# Patient Record
Sex: MALE | Race: WHITE | NOT HISPANIC OR LATINO | URBAN - METROPOLITAN AREA
[De-identification: names, ages, dates, MRNs, and addresses within clinical notes are randomized per-mention and may not be internally consistent; named-entity substitution may affect disease eponyms.]

---

## 2018-09-14 ENCOUNTER — HOSPITAL ENCOUNTER (OUTPATIENT)
Dept: OTHER | Facility: HOSPITAL | Age: 53
Setting detail: SPECIMEN
Discharge: HOME | End: 2018-09-14
Attending: SURGERY | Admitting: SURGERY

## 2023-10-20 ENCOUNTER — APPOINTMENT (OUTPATIENT)
Dept: CT IMAGING | Facility: HOSPITAL | Age: 58
End: 2023-10-20
Payer: COMMERCIAL

## 2023-10-20 ENCOUNTER — HOSPITAL ENCOUNTER (INPATIENT)
Facility: HOSPITAL | Age: 58
LOS: 1 days | Discharge: HOME | End: 2023-10-23
Attending: EMERGENCY MEDICINE | Admitting: HOSPITALIST
Payer: COMMERCIAL

## 2023-10-20 DIAGNOSIS — R06.02 SHORTNESS OF BREATH: Primary | ICD-10-CM

## 2023-10-20 DIAGNOSIS — R05.1 ACUTE COUGH: ICD-10-CM

## 2023-10-20 DIAGNOSIS — R91.8 LUNG MASS: ICD-10-CM

## 2023-10-20 LAB
ALBUMIN SERPL-MCNC: 3.6 G/DL (ref 3.5–5.2)
ALBUMIN/GLOB SERPL: 0.9 G/DL
ALP SERPL-CCNC: 158 U/L (ref 39–117)
ALT SERPL W P-5'-P-CCNC: 25 U/L (ref 1–41)
ANION GAP SERPL CALCULATED.3IONS-SCNC: 15 MMOL/L (ref 5–15)
AST SERPL-CCNC: 49 U/L (ref 1–40)
B PARAPERT DNA SPEC QL NAA+PROBE: NOT DETECTED
B PERT DNA SPEC QL NAA+PROBE: NOT DETECTED
BASOPHILS # BLD AUTO: 0.1 10*3/MM3 (ref 0–0.2)
BASOPHILS NFR BLD AUTO: 1.1 % (ref 0–1.5)
BILIRUB SERPL-MCNC: 0.4 MG/DL (ref 0–1.2)
BUN SERPL-MCNC: 8 MG/DL (ref 6–20)
BUN/CREAT SERPL: 7.1 (ref 7–25)
C PNEUM DNA NPH QL NAA+NON-PROBE: NOT DETECTED
CALCIUM SPEC-SCNC: 9.6 MG/DL (ref 8.6–10.5)
CHLORIDE SERPL-SCNC: 93 MMOL/L (ref 98–107)
CO2 SERPL-SCNC: 24 MMOL/L (ref 22–29)
CREAT SERPL-MCNC: 1.13 MG/DL (ref 0.76–1.27)
D-LACTATE SERPL-SCNC: 2 MMOL/L (ref 0.3–2)
DEPRECATED RDW RBC AUTO: 45.9 FL (ref 37–54)
EGFRCR SERPLBLD CKD-EPI 2021: 74.9 ML/MIN/1.73
EOSINOPHIL # BLD AUTO: 0.1 10*3/MM3 (ref 0–0.4)
EOSINOPHIL NFR BLD AUTO: 0.6 % (ref 0.3–6.2)
ERYTHROCYTE [DISTWIDTH] IN BLOOD BY AUTOMATED COUNT: 14.7 % (ref 12.3–15.4)
FERRITIN SERPL-MCNC: 546.3 NG/ML (ref 30–400)
FLUAV SUBTYP SPEC NAA+PROBE: NOT DETECTED
FLUBV RNA ISLT QL NAA+PROBE: NOT DETECTED
GLOBULIN UR ELPH-MCNC: 4.1 GM/DL
GLUCOSE SERPL-MCNC: 108 MG/DL (ref 65–99)
HADV DNA SPEC NAA+PROBE: NOT DETECTED
HCOV 229E RNA SPEC QL NAA+PROBE: NOT DETECTED
HCOV HKU1 RNA SPEC QL NAA+PROBE: NOT DETECTED
HCOV NL63 RNA SPEC QL NAA+PROBE: NOT DETECTED
HCOV OC43 RNA SPEC QL NAA+PROBE: NOT DETECTED
HCT VFR BLD AUTO: 33.6 % (ref 37.5–51)
HGB BLD-MCNC: 11.4 G/DL (ref 13–17.7)
HMPV RNA NPH QL NAA+NON-PROBE: NOT DETECTED
HPIV1 RNA ISLT QL NAA+PROBE: NOT DETECTED
HPIV2 RNA SPEC QL NAA+PROBE: NOT DETECTED
HPIV3 RNA NPH QL NAA+PROBE: NOT DETECTED
HPIV4 P GENE NPH QL NAA+PROBE: NOT DETECTED
IRON 24H UR-MRATE: 37 MCG/DL (ref 59–158)
LYMPHOCYTES # BLD AUTO: 1 10*3/MM3 (ref 0.7–3.1)
LYMPHOCYTES NFR BLD AUTO: 11.9 % (ref 19.6–45.3)
M PNEUMO IGG SER IA-ACNC: NOT DETECTED
MCH RBC QN AUTO: 28.8 PG (ref 26.6–33)
MCHC RBC AUTO-ENTMCNC: 34.1 G/DL (ref 31.5–35.7)
MCV RBC AUTO: 84.5 FL (ref 79–97)
MONOCYTES # BLD AUTO: 0.3 10*3/MM3 (ref 0.1–0.9)
MONOCYTES NFR BLD AUTO: 3.2 % (ref 5–12)
NEUTROPHILS NFR BLD AUTO: 6.7 10*3/MM3 (ref 1.7–7)
NEUTROPHILS NFR BLD AUTO: 83.2 % (ref 42.7–76)
NRBC BLD AUTO-RTO: 0.2 /100 WBC (ref 0–0.2)
PLATELET # BLD AUTO: 272 10*3/MM3 (ref 140–450)
PMV BLD AUTO: 7.5 FL (ref 6–12)
POTASSIUM SERPL-SCNC: 3.4 MMOL/L (ref 3.5–5.2)
PROCALCITONIN SERPL-MCNC: 0.27 NG/ML (ref 0–0.25)
PROT SERPL-MCNC: 7.7 G/DL (ref 6–8.5)
RBC # BLD AUTO: 3.97 10*6/MM3 (ref 4.14–5.8)
RHINOVIRUS RNA SPEC NAA+PROBE: NOT DETECTED
RSV RNA NPH QL NAA+NON-PROBE: NOT DETECTED
SARS-COV-2 RNA NPH QL NAA+NON-PROBE: NOT DETECTED
SODIUM SERPL-SCNC: 132 MMOL/L (ref 136–145)
WBC NRBC COR # BLD: 8 10*3/MM3 (ref 3.4–10.8)

## 2023-10-20 PROCEDURE — 80053 COMPREHEN METABOLIC PANEL: CPT | Performed by: NURSE PRACTITIONER

## 2023-10-20 PROCEDURE — G0378 HOSPITAL OBSERVATION PER HR: HCPCS

## 2023-10-20 PROCEDURE — 25010000002 CEFTRIAXONE PER 250 MG: Performed by: PHYSICIAN ASSISTANT

## 2023-10-20 PROCEDURE — 25510000001 IOPAMIDOL PER 1 ML: Performed by: EMERGENCY MEDICINE

## 2023-10-20 PROCEDURE — 93005 ELECTROCARDIOGRAM TRACING: CPT | Performed by: NURSE PRACTITIONER

## 2023-10-20 PROCEDURE — 25810000003 SODIUM CHLORIDE 0.9 % SOLUTION 250 ML FLEX CONT: Performed by: PHYSICIAN ASSISTANT

## 2023-10-20 PROCEDURE — 94799 UNLISTED PULMONARY SVC/PX: CPT

## 2023-10-20 PROCEDURE — 83540 ASSAY OF IRON: CPT

## 2023-10-20 PROCEDURE — 82728 ASSAY OF FERRITIN: CPT

## 2023-10-20 PROCEDURE — 99285 EMERGENCY DEPT VISIT HI MDM: CPT

## 2023-10-20 PROCEDURE — 36415 COLL VENOUS BLD VENIPUNCTURE: CPT

## 2023-10-20 PROCEDURE — 85025 COMPLETE CBC W/AUTO DIFF WBC: CPT | Performed by: NURSE PRACTITIONER

## 2023-10-20 PROCEDURE — 25810000003 SODIUM CHLORIDE 0.9 % SOLUTION: Performed by: NURSE PRACTITIONER

## 2023-10-20 PROCEDURE — 0202U NFCT DS 22 TRGT SARS-COV-2: CPT

## 2023-10-20 PROCEDURE — 87040 BLOOD CULTURE FOR BACTERIA: CPT | Performed by: NURSE PRACTITIONER

## 2023-10-20 PROCEDURE — 25010000002 AZITHROMYCIN PER 500 MG: Performed by: PHYSICIAN ASSISTANT

## 2023-10-20 PROCEDURE — 83605 ASSAY OF LACTIC ACID: CPT

## 2023-10-20 PROCEDURE — 84145 PROCALCITONIN (PCT): CPT | Performed by: NURSE PRACTITIONER

## 2023-10-20 PROCEDURE — 71260 CT THORAX DX C+: CPT

## 2023-10-20 PROCEDURE — 94640 AIRWAY INHALATION TREATMENT: CPT

## 2023-10-20 RX ORDER — ONDANSETRON 2 MG/ML
4 INJECTION INTRAMUSCULAR; INTRAVENOUS EVERY 6 HOURS PRN
Status: DISCONTINUED | OUTPATIENT
Start: 2023-10-20 | End: 2023-10-23 | Stop reason: HOSPADM

## 2023-10-20 RX ORDER — NICOTINE 21 MG/24HR
1 PATCH, TRANSDERMAL 24 HOURS TRANSDERMAL EVERY 24 HOURS
Status: DISCONTINUED | OUTPATIENT
Start: 2023-10-20 | End: 2023-10-23 | Stop reason: HOSPADM

## 2023-10-20 RX ORDER — GUAIFENESIN 600 MG/1
1200 TABLET, EXTENDED RELEASE ORAL 2 TIMES DAILY
COMMUNITY

## 2023-10-20 RX ORDER — METHYLPREDNISOLONE 4 MG/1
TABLET ORAL 2 TIMES DAILY
COMMUNITY
Start: 2023-10-19 | End: 2023-10-23 | Stop reason: HOSPADM

## 2023-10-20 RX ORDER — POTASSIUM CHLORIDE 20 MEQ/1
20 TABLET, EXTENDED RELEASE ORAL ONCE
Status: COMPLETED | OUTPATIENT
Start: 2023-10-20 | End: 2023-10-20

## 2023-10-20 RX ORDER — SODIUM CHLORIDE 9 MG/ML
40 INJECTION, SOLUTION INTRAVENOUS AS NEEDED
Status: DISCONTINUED | OUTPATIENT
Start: 2023-10-20 | End: 2023-10-23 | Stop reason: HOSPADM

## 2023-10-20 RX ORDER — ACETAMINOPHEN 160 MG/5ML
650 SOLUTION ORAL EVERY 4 HOURS PRN
Status: DISCONTINUED | OUTPATIENT
Start: 2023-10-20 | End: 2023-10-23 | Stop reason: HOSPADM

## 2023-10-20 RX ORDER — CLONAZEPAM 1 MG/1
1 TABLET ORAL 2 TIMES DAILY PRN
COMMUNITY

## 2023-10-20 RX ORDER — CLONAZEPAM 0.5 MG/1
1 TABLET ORAL ONCE
Status: COMPLETED | OUTPATIENT
Start: 2023-10-20 | End: 2023-10-20

## 2023-10-20 RX ORDER — ALUMINA, MAGNESIA, AND SIMETHICONE 2400; 2400; 240 MG/30ML; MG/30ML; MG/30ML
15 SUSPENSION ORAL EVERY 6 HOURS PRN
Status: DISCONTINUED | OUTPATIENT
Start: 2023-10-20 | End: 2023-10-23 | Stop reason: HOSPADM

## 2023-10-20 RX ORDER — SODIUM CHLORIDE 0.9 % (FLUSH) 0.9 %
10 SYRINGE (ML) INJECTION AS NEEDED
Status: DISCONTINUED | OUTPATIENT
Start: 2023-10-20 | End: 2023-10-23 | Stop reason: HOSPADM

## 2023-10-20 RX ORDER — BISACODYL 10 MG
10 SUPPOSITORY, RECTAL RECTAL DAILY PRN
Status: DISCONTINUED | OUTPATIENT
Start: 2023-10-20 | End: 2023-10-23 | Stop reason: HOSPADM

## 2023-10-20 RX ORDER — ACETAMINOPHEN 325 MG/1
650 TABLET ORAL EVERY 4 HOURS PRN
Status: DISCONTINUED | OUTPATIENT
Start: 2023-10-20 | End: 2023-10-23 | Stop reason: HOSPADM

## 2023-10-20 RX ORDER — BENZONATATE 200 MG/1
200 CAPSULE ORAL 2 TIMES DAILY PRN
COMMUNITY
End: 2023-10-30

## 2023-10-20 RX ORDER — ONDANSETRON 4 MG/1
4 TABLET, FILM COATED ORAL EVERY 6 HOURS PRN
Status: DISCONTINUED | OUTPATIENT
Start: 2023-10-20 | End: 2023-10-23 | Stop reason: HOSPADM

## 2023-10-20 RX ORDER — MIRTAZAPINE 15 MG/1
45 TABLET, FILM COATED ORAL NIGHTLY
Status: DISCONTINUED | OUTPATIENT
Start: 2023-10-20 | End: 2023-10-23 | Stop reason: HOSPADM

## 2023-10-20 RX ORDER — AMOXICILLIN 250 MG
2 CAPSULE ORAL 2 TIMES DAILY
Status: DISCONTINUED | OUTPATIENT
Start: 2023-10-20 | End: 2023-10-23 | Stop reason: HOSPADM

## 2023-10-20 RX ORDER — BISACODYL 5 MG/1
5 TABLET, DELAYED RELEASE ORAL DAILY PRN
Status: DISCONTINUED | OUTPATIENT
Start: 2023-10-20 | End: 2023-10-23 | Stop reason: HOSPADM

## 2023-10-20 RX ORDER — NICOTINE 21 MG/24HR
1 PATCH, TRANSDERMAL 24 HOURS TRANSDERMAL EVERY 24 HOURS
Status: DISCONTINUED | OUTPATIENT
Start: 2023-10-20 | End: 2023-10-20

## 2023-10-20 RX ORDER — POLYETHYLENE GLYCOL 3350 17 G/17G
17 POWDER, FOR SOLUTION ORAL DAILY PRN
Status: DISCONTINUED | OUTPATIENT
Start: 2023-10-20 | End: 2023-10-23 | Stop reason: HOSPADM

## 2023-10-20 RX ORDER — CLONAZEPAM 1 MG/1
1 TABLET ORAL 2 TIMES DAILY PRN
Status: DISCONTINUED | OUTPATIENT
Start: 2023-10-20 | End: 2023-10-23 | Stop reason: HOSPADM

## 2023-10-20 RX ORDER — IPRATROPIUM BROMIDE AND ALBUTEROL SULFATE 2.5; .5 MG/3ML; MG/3ML
3 SOLUTION RESPIRATORY (INHALATION)
Status: DISCONTINUED | OUTPATIENT
Start: 2023-10-20 | End: 2023-10-23 | Stop reason: HOSPADM

## 2023-10-20 RX ORDER — DOXEPIN HYDROCHLORIDE 75 MG/1
75 CAPSULE ORAL NIGHTLY
COMMUNITY

## 2023-10-20 RX ORDER — ACETAMINOPHEN 650 MG/1
650 SUPPOSITORY RECTAL EVERY 4 HOURS PRN
Status: DISCONTINUED | OUTPATIENT
Start: 2023-10-20 | End: 2023-10-23 | Stop reason: HOSPADM

## 2023-10-20 RX ORDER — MIRTAZAPINE 45 MG/1
45 TABLET, FILM COATED ORAL NIGHTLY
COMMUNITY

## 2023-10-20 RX ORDER — DOXEPIN HYDROCHLORIDE 25 MG/1
75 CAPSULE ORAL NIGHTLY
Status: DISCONTINUED | OUTPATIENT
Start: 2023-10-20 | End: 2023-10-23 | Stop reason: HOSPADM

## 2023-10-20 RX ORDER — GUAIFENESIN 600 MG/1
1200 TABLET, EXTENDED RELEASE ORAL 2 TIMES DAILY
Status: DISCONTINUED | OUTPATIENT
Start: 2023-10-20 | End: 2023-10-23 | Stop reason: HOSPADM

## 2023-10-20 RX ORDER — SODIUM CHLORIDE 0.9 % (FLUSH) 0.9 %
10 SYRINGE (ML) INJECTION EVERY 12 HOURS SCHEDULED
Status: DISCONTINUED | OUTPATIENT
Start: 2023-10-20 | End: 2023-10-23 | Stop reason: HOSPADM

## 2023-10-20 RX ORDER — CHOLECALCIFEROL (VITAMIN D3) 125 MCG
5 CAPSULE ORAL NIGHTLY PRN
Status: DISCONTINUED | OUTPATIENT
Start: 2023-10-20 | End: 2023-10-23 | Stop reason: HOSPADM

## 2023-10-20 RX ADMIN — DOCUSATE SODIUM 50 MG AND SENNOSIDES 8.6 MG 2 TABLET: 8.6; 5 TABLET, FILM COATED ORAL at 21:44

## 2023-10-20 RX ADMIN — SODIUM CHLORIDE 1000 ML: 9 INJECTION, SOLUTION INTRAVENOUS at 15:20

## 2023-10-20 RX ADMIN — IOPAMIDOL 100 ML: 755 INJECTION, SOLUTION INTRAVENOUS at 15:55

## 2023-10-20 RX ADMIN — MIRTAZAPINE 45 MG: 15 TABLET, FILM COATED ORAL at 21:44

## 2023-10-20 RX ADMIN — POTASSIUM CHLORIDE 20 MEQ: 1500 TABLET, EXTENDED RELEASE ORAL at 17:01

## 2023-10-20 RX ADMIN — CLONAZEPAM 1 MG: 0.5 TABLET ORAL at 20:34

## 2023-10-20 RX ADMIN — Medication 1 PATCH: at 20:35

## 2023-10-20 RX ADMIN — GUAIFENESIN 1200 MG: 600 TABLET, EXTENDED RELEASE ORAL at 20:34

## 2023-10-20 RX ADMIN — AZITHROMYCIN MONOHYDRATE 500 MG: 500 INJECTION, POWDER, LYOPHILIZED, FOR SOLUTION INTRAVENOUS at 18:32

## 2023-10-20 RX ADMIN — CEFTRIAXONE 1000 MG: 1 INJECTION, POWDER, FOR SOLUTION INTRAMUSCULAR; INTRAVENOUS at 17:01

## 2023-10-20 RX ADMIN — IPRATROPIUM BROMIDE AND ALBUTEROL SULFATE 3 ML: .5; 3 SOLUTION RESPIRATORY (INHALATION) at 22:36

## 2023-10-20 RX ADMIN — DOXEPIN HYDROCHLORIDE 75 MG: 25 CAPSULE ORAL at 21:44

## 2023-10-20 RX ADMIN — Medication 10 ML: at 20:35

## 2023-10-20 NOTE — H&P
[facility] Medicine Services  History & Physical    Patient Name: Romero Maciel  : 1965  MRN: [id number]  Primary Care Physician:  Jennifer Maynard APRN  Date of admission: 10/20/2023  Date and Time of Service: 10/20/2023     Subjective      Chief Complaint: cough, abnormal chest xray     History of Present Illness: Romero Maciel is a 58 y.o. male with past medical history of severe anxiety, 2 pack/day smoker for the past 30 years who presented to [facility] on 10/20/2023 complaining of shortness of breath, cough, decreased appetite and abnormal chest xray. He additionally reports 20 lbs unintentional weight  loss over the past 6 weeks, and his voice being hoarse over the past couple days. He was seen yesterday by pcp who ordered a chest xray he had completed at [facility] with radiology report with him that looks like a right upper lobe mass versus infiltrate and was advised to come to the ED for further evaluation. He denies fever, or chest pain, nausea, vomiting, diarrhea, or urinary complaints.     In the ED, CT of chest interpreted by radiologist shows a very large right upper lobe mass which extends into the right hilum and mediastinum with gross mediastinal adenopathy. Findings are compatible with malignancy. Infiltrates at the periphery of the lesion are compatible with postobstructive infiltrates. EKG shows sinus tachycardia, heart rate 103 bpm.  Vitals on admission, temp 98.2, heart rate 94, respirations 18, /66, SPO2 99%.  All labs unremarkable except sodium 132, potassium 3.4, alkaline phosphatase 158, procalcitonin 0.27, hemoglobin 11.4, hematocrit 33.6.  Blood cultures obtained, results pending. Patient received IV azithromycin, IV Rocephin, 20 mEq potassium chloride and a one 1 L normal saline bolus in ED. Hospitalist was contacted to admit patient for further care and management.         12 point ROS reviewed and negative except as mentioned above.        Personal History     History reviewed. No pertinent past medical history.    History reviewed. No pertinent surgical history.    Family History: family history is not on file. Otherwise pertinent FHx was reviewed and not pertinent to current issue.    Social History:      Home Medications:  Prior to Admission Medications       None              Allergies:  Allergies   Allergen Reactions    Penicillins Anaphylaxis       Objective      Vitals:   Temp:  [98.2 °F (36.8 °C)] 98.2 °F (36.8 °C)  Heart Rate:  [] 94  Resp:  [18] 18  BP: (104-129)/(66-74) 112/66    Physical Exam  Vitals reviewed.   Constitutional:       General: He is awake. He is not in acute distress.     Appearance: He is ill-appearing. He is not diaphoretic.   HENT:      Head: Normocephalic and atraumatic.      Mouth/Throat:      Mouth: Mucous membranes are dry.      Pharynx: Oropharynx is clear.   Eyes:      Extraocular Movements: Extraocular movements intact.      Pupils: Pupils are equal, round, and reactive to light.   Cardiovascular:      Rate and Rhythm: Normal rate and regular rhythm.      Pulses: Normal pulses.      Heart sounds: Normal heart sounds.   Pulmonary:      Breath sounds: Rhonchi present.   Abdominal:      General: Abdomen is flat. Bowel sounds are normal.      Palpations: Abdomen is soft.   Musculoskeletal:         General: Normal range of motion.      Cervical back: Normal range of motion and neck supple.   Skin:     General: Skin is warm and dry.   Neurological:      General: No focal deficit present.      Mental Status: He is alert and oriented to person, place, and time.   Psychiatric:         Attention and Perception: Attention normal.         Mood and Affect: Mood is anxious. Affect is tearful.         Behavior: Behavior is cooperative.      Comments: Hoarse voice          Result Review    Result Review:  I have personally reviewed the results from the time of this admission to 10/20/2023 18:33 EDT and agree with these  findings:  [x]  Laboratory  [x]  Microbiology  [x]  Radiology  []  EKG/Telemetry   []  Cardiology/Vascular   []  Pathology  []  Old records  []  Other:  Most notable findings include:   In the ED, CT of chest interpreted by radiologist shows a very large right upper lobe mass which extends into the right hilum and mediastinum with gross mediastinal adenopathy. Findings are compatible with malignancy. Infiltrates at the periphery of the lesion are compatible with postobstructive infiltrates. EKG shows sinus tachycardia, heart rate 103 bpm.  Vitals on admission, temp 98.2, heart rate 94, respirations 18, /66, SPO2 99%.  All labs unremarkable except sodium 132, potassium 3.4, alkaline phosphatase 158, procalcitonin 0.27, hemoglobin 11.4, hematocrit 33.6.  Blood cultures obtained, results pending. Patient received IV azithromycin, IV Rocephin, 20 mEq potassium chloride and a one 1 L normal saline bolus in ED. Hospitalist was contacted to admit patient for further care and management.     Assessment & Plan        Active Hospital Problems:  Active Hospital Problems    Diagnosis     **Lung mass      Plan:     Right upper lobe lung mass  - Pt presented with sob, cough, abnormal chest xray and 20 lbs unintentional weight loss  - 2 pack/day x30 year smoker  - CT chest showed, very large right upper lobe mass which extends into the right hilum and mediastinum with gross mediastinal adenopathy. Findings are compatible with malignancy  - outpatient CX[facility]   - WBC 8.0  - lactate 2.0  - procal 0.27  - Patient given IV rocephin and IV azithromycin in ED x1 due to concern for pneumonia on initial presentation, will continue for now pending further evaluation by pulmonary and oncology  - Patient will likely need additional imaging, possibly CT Abdomen/pelvis, MRI of brain and PET scan and biopsy for further diagnosis and staging >>> will defer to oncology/pulmonology   - Duo-neb   - resume Mucinex   -  Pulmonary consulted  - Oncology consulted    Hypokalemia  - potassium 3.4  - given 20 meq potassium in ED x1  - potassium replacement ordered    Hyponatremia  - Na 132  - 1 L normal saline given in ED x1  - recheck bmp in am     Anemia  - Hgb 11.4 / Hct 33.6  - check anemia studies   - recheck cbc in am     Anxiety/Depression  - patient reports taking Klonopin 1 mg twice daily, confirmed through dispense history  - will give one dose now as patient tearful and anxious after discussion of new diagnosis  - resume Klonopin, doxepin and remeron    Tobacco use  - encourage smoking cessation  - Nicotine patch ordered      DVT prophylaxis:  Mechanical DVT prophylaxis orders are present.    CODE STATUS:    Code Status (Patient has no pulse and is not breathing): CPR (Attempt to Resuscitate)  Medical Interventions (Patient has pulse or is breathing): Full Support    Admission Status:  I believe this patient meets observation status.    I discussed the patient's findings and my recommendations with patient, family, and attending  .      Signature: Electronically signed by DEIDRE Zavala, 10/20/23, 18:33 EDT.  [facility] Hospitalist Team

## 2023-10-20 NOTE — ED PROVIDER NOTES
Subjective   Provider in Triage Note  Patient is a 30  yr pack smoker with SOA; no productive cough or fever.  States he had a 20 pound weight loss in the last couple months and no appetite.  Denies any vomiting diarrhea urinary complaints.  States he had an outpatient chest x-ray at [facility] with the radiology report with him that looks like a right upper lobe mass versus infiltrate and was sent to the ER by his PCP that he saw today.    Due to significant overcrowding in the emergency department patient was initially seen and evaluated in triage.  Provider in triage recommended patient placement in the treatment area to initiate therapy and movement to an ER bed as soon as possible.   Orders placed; medications will be deferred to main provider per protocol.        History of Present Illness    HPI reviewed and same as above.  Patient is a 58-year-old male who comes in complaining of shortness of breath and has sniffing and smoking history.    Review of Systems   Constitutional:  Positive for appetite change and unexpected weight change. Negative for chills, fatigue and fever.   HENT:  Negative for congestion, sore throat, tinnitus and trouble swallowing.    Eyes:  Negative for photophobia, discharge and visual disturbance.   Respiratory:  Positive for cough and shortness of breath. Negative for wheezing.    Cardiovascular:  Negative for chest pain, palpitations and leg swelling.   Gastrointestinal:  Negative for abdominal pain, blood in stool, diarrhea, nausea and vomiting.   Genitourinary:  Negative for dysuria, flank pain, frequency and urgency.   Musculoskeletal:  Negative for arthralgias and myalgias.   Skin:  Negative for rash.   Neurological:  Negative for dizziness, syncope and headaches.   Psychiatric/Behavioral:  Negative for confusion.        History reviewed. No pertinent past medical history.    Allergies   Allergen Reactions    Penicillins Anaphylaxis       History reviewed. No pertinent  surgical history.    History reviewed. No pertinent family history.    Social History     Socioeconomic History    Marital status: Single           Objective   Physical Exam  Vitals and nursing note reviewed.   Constitutional:       General: He is not in acute distress.     Appearance: He is well-developed. He is not diaphoretic.   HENT:      Head: Normocephalic and atraumatic.      Nose: Nose normal.      Mouth/Throat:      Pharynx: No oropharyngeal exudate.   Eyes:      Extraocular Movements: Extraocular movements intact.      Conjunctiva/sclera: Conjunctivae normal.      Pupils: Pupils are equal, round, and reactive to light.   Cardiovascular:      Rate and Rhythm: Normal rate and regular rhythm.      Heart sounds: Normal heart sounds.      Comments: S1, S2 audible.  Pulmonary:      Effort: Pulmonary effort is normal. No respiratory distress.      Breath sounds: Normal breath sounds. No wheezing, rhonchi or rales.      Comments: On room air.  Abdominal:      General: Bowel sounds are normal. There is no distension.      Palpations: Abdomen is soft.      Tenderness: There is no abdominal tenderness.   Musculoskeletal:         General: No tenderness or deformity. Normal range of motion.      Cervical back: Normal range of motion.      Right lower leg: No edema.      Left lower leg: No edema.   Skin:     General: Skin is warm.      Capillary Refill: Capillary refill takes less than 2 seconds.      Findings: No erythema or rash.   Neurological:      Mental Status: He is alert and oriented to person, place, and time.      Cranial Nerves: No cranial nerve deficit.   Psychiatric:         Mood and Affect: Mood normal.         Behavior: Behavior normal.         Procedures           ED Course  ED Course as of 10/20/23 1841   Fri Oct 20, 2023   1545 Records faxed from [facility] for chest x-ray done on 10/19/2023 that showed a large area of consolidation overlies the right upper lung consistent with a marked  "bronchopneumonia. [RL]   1555 Awaiting imaging [RL]   1559 EKG independently interpreted by myself shows sinus tachycardia rate 103 beats a minute, no ST elevation apparent.  No previous to compare.  Findings confirmed by . [RL]      ED Course User Index  [RL] Rajesh Rincon PA      Labs Reviewed   COMPREHENSIVE METABOLIC PANEL - Abnormal; Notable for the following components:       Result Value    Glucose 108 (*)     Sodium 132 (*)     Potassium 3.4 (*)     Chloride 93 (*)     AST (SGOT) 49 (*)     Alkaline Phosphatase 158 (*)     All other components within normal limits    Narrative:     GFR Normal >60  Chronic Kidney Disease <60  Kidney Failure <15     PROCALCITONIN - Abnormal; Notable for the following components:    Procalcitonin 0.27 (*)     All other components within normal limits    Narrative:     As a Marker for Sepsis (Non-Neonates):    1. <0.5 ng/mL represents a low risk of severe sepsis and/or septic shock.  2. >2 ng/mL represents a high risk of severe sepsis and/or septic shock.    As a Marker for Lower Respiratory Tract Infections that require antibiotic therapy:    PCT on Admission    Antibiotic Therapy       6-12 Hrs later    >0.5                Strongly Recommended  >0.25 - <0.5        Recommended   0.1 - 0.25          Discouraged              Remeasure/reassess PCT  <0.1                Strongly Discouraged     Remeasure/reassess PCT    As 28 day mortality risk marker: \"Change in Procalcitonin Result\" (>80% or <=80%) if Day 0 (or Day 1) and Day 4 values are available. Refer to http://www.[facility]-pct-calculator.com    Change in PCT <=80%  A decrease of PCT levels below or equal to 80% defines a positive change in PCT test result representing a higher risk for 28-day all-cause mortality of patients diagnosed with severe sepsis for septic shock.    Change in PCT >80%  A decrease of PCT levels of more than 80% defines a negative change in PCT result representing a lower risk for 28-day all-cause " mortality of patients diagnosed with severe sepsis or septic shock.      CBC WITH AUTO DIFFERENTIAL - Abnormal; Notable for the following components:    RBC 3.97 (*)     Hemoglobin 11.4 (*)     Hematocrit 33.6 (*)     Neutrophil % 83.2 (*)     Lymphocyte % 11.9 (*)     Monocyte % 3.2 (*)     All other components within normal limits   POC LACTATE - Normal   BLOOD CULTURE   BLOOD CULTURE   RESPIRATORY PANEL PCR W/ COVID-19 (SARS-COV-2), NP SWAB IN UTM/VTP, 3-4 HR TAT   VITAMIN B12   FOLATE   FERRITIN   IRON   POC LACTATE   CBC AND DIFFERENTIAL    Narrative:     The following orders were created for panel order CBC & Differential.  Procedure                               Abnormality         Status                     ---------                               -----------         ------                     CBC Auto Differential[[id number]]        Abnormal            Final result                 Please view results for these tests on the individual orders.     Labs Reviewed   COMPREHENSIVE METABOLIC PANEL - Abnormal; Notable for the following components:       Result Value    Glucose 108 (*)     Sodium 132 (*)     Potassium 3.4 (*)     Chloride 93 (*)     AST (SGOT) 49 (*)     Alkaline Phosphatase 158 (*)     All other components within normal limits    Narrative:     GFR Normal >60  Chronic Kidney Disease <60  Kidney Failure <15     PROCALCITONIN - Abnormal; Notable for the following components:    Procalcitonin 0.27 (*)     All other components within normal limits    Narrative:     As a Marker for Sepsis (Non-Neonates):    1. <0.5 ng/mL represents a low risk of severe sepsis and/or septic shock.  2. >2 ng/mL represents a high risk of severe sepsis and/or septic shock.    As a Marker for Lower Respiratory Tract Infections that require antibiotic therapy:    PCT on Admission    Antibiotic Therapy       6-12 Hrs later    >0.5                Strongly Recommended  >0.25 - <0.5        Recommended   0.1 - 0.25           "Discouraged              Remeasure/reassess PCT  <0.1                Strongly Discouraged     Remeasure/reassess PCT    As 28 day mortality risk marker: \"Change in Procalcitonin Result\" (>80% or <=80%) if Day 0 (or Day 1) and Day 4 values are available. Refer to http://www.[facility]-pct-calculator.com    Change in PCT <=80%  A decrease of PCT levels below or equal to 80% defines a positive change in PCT test result representing a higher risk for 28-day all-cause mortality of patients diagnosed with severe sepsis for septic shock.    Change in PCT >80%  A decrease of PCT levels of more than 80% defines a negative change in PCT result representing a lower risk for 28-day all-cause mortality of patients diagnosed with severe sepsis or septic shock.      CBC WITH AUTO DIFFERENTIAL - Abnormal; Notable for the following components:    RBC 3.97 (*)     Hemoglobin 11.4 (*)     Hematocrit 33.6 (*)     Neutrophil % 83.2 (*)     Lymphocyte % 11.9 (*)     Monocyte % 3.2 (*)     All other components within normal limits   POC LACTATE - Normal   BLOOD CULTURE   BLOOD CULTURE   RESPIRATORY PANEL PCR W/ COVID-19 (SARS-COV-2), NP SWAB IN UTM/VTP, 3-4 HR TAT   VITAMIN B12   FOLATE   FERRITIN   IRON   POC LACTATE   CBC AND DIFFERENTIAL    Narrative:     The following orders were created for panel order CBC & Differential.  Procedure                               Abnormality         Status                     ---------                               -----------         ------                     CBC Auto Differential[[id number]]        Abnormal            Final result                 Please view results for these tests on the individual orders.                                          Medical Decision Making  Problems Addressed:  Acute cough: complicated acute illness or injury  Lung mass: complicated acute illness or injury  Shortness of breath: complicated acute illness or injury    Amount and/or Complexity of Data Reviewed  Labs: " "ordered.  Radiology: ordered.  ECG/medicine tests: ordered.    Risk  Prescription drug management.  Decision regarding hospitalization.      Differential diagnosis: Pneumonia, bronchitis, not ment to be an all inclusive list.    Chart review:  Last urgent care note on 6/8/2022, patient was seen for insect bite of inner thigh.    EKG:  see above    Imaging:    CT Chest With Contrast Diagnostic   Final Result   Impression:   Very large right upper lobe mass which extends into the right hilum and mediastinum with gross mediastinal adenopathy. Findings are compatible with malignancy. Infiltrates at the periphery of the lesion are compatible with postobstructive infiltrates.      Minimal right pleural effusion.            Electronically Signed: Anne-Marie Koroma MD     10/20/2023 4:10 PM EDT     Workstation ID: [id number]        Labs:  Lab work independently interpreted by myself shows procalcitonin slightly elevated at 0.27.  CBC and CMP largely unremarkable for acute findings although potassium slightly low at 3.4 replacement ordered.  Initial lactic normal.  Blood cultures x2 pending.    Vitals:  /70   Pulse 93   Temp 98.2 °F (36.8 °C) (Oral)   Resp 18   Ht 175.3 cm (69\")   Wt 65.7 kg (144 lb 13.5 oz)   SpO2 97%   BMI 21.39 kg/m²    Medications given:    Medications   sodium chloride 0.9 % flush 10 mL (has no administration in time range)   azithromycin (ZITHROMAX) 500 mg in sodium chloride 0.9 % 250 mL IVPB-VTB (500 mg Intravenous New Bag 10/20/23 1832)   clonazePAM (KlonoPIN) tablet 1 mg (has no administration in time range)   sodium chloride 0.9 % flush 10 mL (has no administration in time range)   sodium chloride 0.9 % flush 10 mL (has no administration in time range)   sodium chloride 0.9 % infusion 40 mL (has no administration in time range)   acetaminophen (TYLENOL) tablet 650 mg (has no administration in time range)     Or   acetaminophen (TYLENOL) 160 MG/5ML oral solution 650 mg (has no " administration in time range)     Or   acetaminophen (TYLENOL) suppository 650 mg (has no administration in time range)   aluminum-magnesium hydroxide-simethicone (MAALOX MAX) 400-400-40 MG/5ML suspension 15 mL (has no administration in time range)   sennosides-docusate (PERICOLACE) 8.6-50 MG per tablet 2 tablet (has no administration in time range)     And   polyethylene glycol (MIRALAX) packet 17 g (has no administration in time range)     And   bisacodyl (DULCOLAX) EC tablet 5 mg (has no administration in time range)     And   bisacodyl (DULCOLAX) suppository 10 mg (has no administration in time range)   ondansetron (ZOFRAN) tablet 4 mg (has no administration in time range)     Or   ondansetron (ZOFRAN) injection 4 mg (has no administration in time range)   Potassium Replacement - Follow Nurse / BPA Driven Protocol (has no administration in time range)   Magnesium Standard Dose Replacement - Follow Nurse / BPA Driven Protocol (has no administration in time range)   Phosphorus Replacement - Follow Nurse / BPA Driven Protocol (has no administration in time range)   Calcium Replacement - Follow Nurse / BPA Driven Protocol (has no administration in time range)   melatonin tablet 5 mg (has no administration in time range)   nicotine (NICODERM CQ) 21 MG/24HR patch 1 patch (has no administration in time range)   azithromycin (ZITHROMAX) 500 mg in sodium chloride 0.9 % 250 mL IVPB-VTB (has no administration in time range)   cefTRIAXone (ROCEPHIN) 1,000 mg in sodium chloride 0.9 % 100 mL IVPB (has no administration in time range)   ipratropium-albuterol (DUO-NEB) nebulizer solution 3 mL (has no administration in time range)   clonazePAM (KlonoPIN) tablet 1 mg (has no administration in time range)   mirtazapine (REMERON) tablet 45 mg (has no administration in time range)   guaiFENesin (MUCINEX) 12 hr tablet 1,200 mg (has no administration in time range)   doxepin (SINEquan) capsule 75 mg (has no administration in time  range)   sodium chloride 0.9 % bolus 1,000 mL (0 mL Intravenous Stopped 10/20/23 1840)   iopamidol (ISOVUE-370) 76 % injection 100 mL (100 mL Intravenous Given 10/20/23 1555)   potassium chloride (K-DUR,KLOR-CON) CR tablet 20 mEq (20 mEq Oral Given 10/20/23 1701)   cefTRIAXone (ROCEPHIN) 1,000 mg in sodium chloride 0.9 % 100 mL IVPB (0 mg Intravenous Stopped 10/20/23 1840)       Procedures:  not indicated  MDM: Patient is a 58-year-old male comes in complaining of shortness of breath.  Lab work independently interpreted by myself shows procalcitonin slightly elevated at 0.27.  CBC and CMP largely unremarkable for acute findings although potassium slightly low at 3.4 replacement ordered.  Initial lactic normal.  Blood cultures x2 pending.  CT chest shows findings above concerning for a large right mass of right upper lobe of the lung.  These findings discussed with patient at length and concerning for cancer given patient's over 30 years smoking history.  Patient is not tachypneic here no stridor or wheezing on exam and is not complaining of shortness of breath at rest at this time.  No fever no white count.  Will cover with azithromycin and Rocephin for now for possible pneumonia however I am likely thinking procalcitonin is elevated secondary to likely malignancy.  EKG showed no acute findings.  Spoke with DEIDRE Mancilla, accepted patient behalf of Dr. Pacheco for admission hospital further work-up and treatment.  Plan discussed with patient and is agreeable with plan.  Case discussed with attending provider above.      Final diagnoses:   Shortness of breath   Lung mass   Acute cough       ED Disposition  ED Disposition       ED Disposition   Decision to Admit    Condition   --    Comment   Level of Care: Telemetry [5]   Diagnosis: Lung mass [[id number]]   Admitting Physician: SONIDO PACHECO [[id number]]   Attending Physician: SONIDO PACHECO [[id number]]                 No follow-up provider specified.       Medication List      No  changes were made to your prescriptions during this visit.            Rajesh Rincon PA  10/20/23 1840       Rajesh Rincon PA  10/20/23 1845

## 2023-10-20 NOTE — Clinical Note
Level of Care: Telemetry [5]   Admitting Physician: SONIDO PACHECO [[id number]]   Attending Physician: SONIDO PACHECO [[id number]]

## 2023-10-21 ENCOUNTER — APPOINTMENT (OUTPATIENT)
Dept: CT IMAGING | Facility: HOSPITAL | Age: 58
End: 2023-10-21
Payer: COMMERCIAL

## 2023-10-21 LAB
ANION GAP SERPL CALCULATED.3IONS-SCNC: 12 MMOL/L (ref 5–15)
BASOPHILS # BLD AUTO: 0 10*3/MM3 (ref 0–0.2)
BASOPHILS NFR BLD AUTO: 0.9 % (ref 0–1.5)
BUN SERPL-MCNC: 6 MG/DL (ref 6–20)
BUN/CREAT SERPL: 6.3 (ref 7–25)
CALCIUM SPEC-SCNC: 8.7 MG/DL (ref 8.6–10.5)
CHLORIDE SERPL-SCNC: 100 MMOL/L (ref 98–107)
CO2 SERPL-SCNC: 23 MMOL/L (ref 22–29)
CREAT SERPL-MCNC: 0.95 MG/DL (ref 0.76–1.27)
DEPRECATED RDW RBC AUTO: 45.9 FL (ref 37–54)
EGFRCR SERPLBLD CKD-EPI 2021: 92.2 ML/MIN/1.73
EOSINOPHIL # BLD AUTO: 0.1 10*3/MM3 (ref 0–0.4)
EOSINOPHIL NFR BLD AUTO: 1.8 % (ref 0.3–6.2)
ERYTHROCYTE [DISTWIDTH] IN BLOOD BY AUTOMATED COUNT: 14.9 % (ref 12.3–15.4)
FOLATE SERPL-MCNC: <2 NG/ML (ref 4.78–24.2)
GLUCOSE SERPL-MCNC: 89 MG/DL (ref 65–99)
HCT VFR BLD AUTO: 29.1 % (ref 37.5–51)
HGB BLD-MCNC: 9.7 G/DL (ref 13–17.7)
LYMPHOCYTES # BLD AUTO: 1 10*3/MM3 (ref 0.7–3.1)
LYMPHOCYTES NFR BLD AUTO: 18.8 % (ref 19.6–45.3)
MCH RBC QN AUTO: 28.1 PG (ref 26.6–33)
MCHC RBC AUTO-ENTMCNC: 33.3 G/DL (ref 31.5–35.7)
MCV RBC AUTO: 84.5 FL (ref 79–97)
MONOCYTES # BLD AUTO: 0.2 10*3/MM3 (ref 0.1–0.9)
MONOCYTES NFR BLD AUTO: 4.2 % (ref 5–12)
NEUTROPHILS NFR BLD AUTO: 3.9 10*3/MM3 (ref 1.7–7)
NEUTROPHILS NFR BLD AUTO: 74.3 % (ref 42.7–76)
NRBC BLD AUTO-RTO: 0.1 /100 WBC (ref 0–0.2)
PLATELET # BLD AUTO: 267 10*3/MM3 (ref 140–450)
PMV BLD AUTO: 7.2 FL (ref 6–12)
POTASSIUM SERPL-SCNC: 3.5 MMOL/L (ref 3.5–5.2)
QT INTERVAL: 338 MS
QTC INTERVAL: 442 MS
RBC # BLD AUTO: 3.44 10*6/MM3 (ref 4.14–5.8)
SODIUM SERPL-SCNC: 135 MMOL/L (ref 136–145)
VIT B12 BLD-MCNC: 276 PG/ML (ref 211–946)
WBC NRBC COR # BLD: 5.3 10*3/MM3 (ref 3.4–10.8)

## 2023-10-21 PROCEDURE — 82607 VITAMIN B-12: CPT

## 2023-10-21 PROCEDURE — 25010000002 AZITHROMYCIN PER 500 MG

## 2023-10-21 PROCEDURE — 85025 COMPLETE CBC W/AUTO DIFF WBC: CPT

## 2023-10-21 PROCEDURE — 99204 OFFICE O/P NEW MOD 45 MIN: CPT | Performed by: INTERNAL MEDICINE

## 2023-10-21 PROCEDURE — 94799 UNLISTED PULMONARY SVC/PX: CPT

## 2023-10-21 PROCEDURE — 94761 N-INVAS EAR/PLS OXIMETRY MLT: CPT

## 2023-10-21 PROCEDURE — 74177 CT ABD & PELVIS W/CONTRAST: CPT

## 2023-10-21 PROCEDURE — 25810000003 SODIUM CHLORIDE 0.9 % SOLUTION 250 ML FLEX CONT

## 2023-10-21 PROCEDURE — G0378 HOSPITAL OBSERVATION PER HR: HCPCS

## 2023-10-21 PROCEDURE — 82746 ASSAY OF FOLIC ACID SERUM: CPT

## 2023-10-21 PROCEDURE — 36415 COLL VENOUS BLD VENIPUNCTURE: CPT

## 2023-10-21 PROCEDURE — 25510000001 IOPAMIDOL PER 1 ML: Performed by: INTERNAL MEDICINE

## 2023-10-21 PROCEDURE — 25010000002 CEFTRIAXONE PER 250 MG

## 2023-10-21 PROCEDURE — 80048 BASIC METABOLIC PNL TOTAL CA: CPT

## 2023-10-21 RX ORDER — LORAZEPAM 1 MG/1
1 TABLET ORAL ONCE
Status: COMPLETED | OUTPATIENT
Start: 2023-10-21 | End: 2023-10-21

## 2023-10-21 RX ORDER — POTASSIUM CHLORIDE 20 MEQ/1
40 TABLET, EXTENDED RELEASE ORAL EVERY 4 HOURS
Status: COMPLETED | OUTPATIENT
Start: 2023-10-21 | End: 2023-10-21

## 2023-10-21 RX ADMIN — Medication 1 PATCH: at 20:54

## 2023-10-21 RX ADMIN — GUAIFENESIN 1200 MG: 600 TABLET, EXTENDED RELEASE ORAL at 20:54

## 2023-10-21 RX ADMIN — IPRATROPIUM BROMIDE AND ALBUTEROL SULFATE 3 ML: .5; 3 SOLUTION RESPIRATORY (INHALATION) at 15:00

## 2023-10-21 RX ADMIN — IOPAMIDOL 100 ML: 755 INJECTION, SOLUTION INTRAVENOUS at 17:23

## 2023-10-21 RX ADMIN — LORAZEPAM 1 MG: 1 TABLET ORAL at 16:55

## 2023-10-21 RX ADMIN — IPRATROPIUM BROMIDE AND ALBUTEROL SULFATE 3 ML: .5; 3 SOLUTION RESPIRATORY (INHALATION) at 11:00

## 2023-10-21 RX ADMIN — CEFTRIAXONE 1000 MG: 1 INJECTION, POWDER, FOR SOLUTION INTRAMUSCULAR; INTRAVENOUS at 20:54

## 2023-10-21 RX ADMIN — DOCUSATE SODIUM 50 MG AND SENNOSIDES 8.6 MG 2 TABLET: 8.6; 5 TABLET, FILM COATED ORAL at 09:09

## 2023-10-21 RX ADMIN — GUAIFENESIN 1200 MG: 600 TABLET, EXTENDED RELEASE ORAL at 09:09

## 2023-10-21 RX ADMIN — CLONAZEPAM 1 MG: 1 TABLET ORAL at 20:54

## 2023-10-21 RX ADMIN — MIRTAZAPINE 45 MG: 15 TABLET, FILM COATED ORAL at 20:53

## 2023-10-21 RX ADMIN — AZITHROMYCIN MONOHYDRATE 500 MG: 500 INJECTION, POWDER, LYOPHILIZED, FOR SOLUTION INTRAVENOUS at 16:16

## 2023-10-21 RX ADMIN — POTASSIUM CHLORIDE 40 MEQ: 1500 TABLET, EXTENDED RELEASE ORAL at 20:54

## 2023-10-21 RX ADMIN — DOXEPIN HYDROCHLORIDE 75 MG: 25 CAPSULE ORAL at 20:53

## 2023-10-21 RX ADMIN — IPRATROPIUM BROMIDE AND ALBUTEROL SULFATE 3 ML: .5; 3 SOLUTION RESPIRATORY (INHALATION) at 02:36

## 2023-10-21 RX ADMIN — Medication 10 ML: at 09:09

## 2023-10-21 RX ADMIN — DOCUSATE SODIUM 50 MG AND SENNOSIDES 8.6 MG 2 TABLET: 8.6; 5 TABLET, FILM COATED ORAL at 20:53

## 2023-10-21 RX ADMIN — IPRATROPIUM BROMIDE AND ALBUTEROL SULFATE 3 ML: .5; 3 SOLUTION RESPIRATORY (INHALATION) at 23:02

## 2023-10-21 RX ADMIN — IPRATROPIUM BROMIDE AND ALBUTEROL SULFATE 3 ML: .5; 3 SOLUTION RESPIRATORY (INHALATION) at 06:54

## 2023-10-21 RX ADMIN — POTASSIUM CHLORIDE 40 MEQ: 1500 TABLET, EXTENDED RELEASE ORAL at 16:16

## 2023-10-21 RX ADMIN — Medication 10 ML: at 20:55

## 2023-10-21 RX ADMIN — CLONAZEPAM 1 MG: 1 TABLET ORAL at 09:09

## 2023-10-21 NOTE — NURSING NOTE
Patient is not ready to sign consent at this time until provider speaks to him regarding bronchoscopy. This nurse left consent form at bedside.

## 2023-10-21 NOTE — PROGRESS NOTES
[facility] Medicine Services   Daily Progress Note    Patient Name: Romero Maciel  : 1965  MRN: [id number]  Primary Care Physician:  Jennifer Maynard APRN  Date of admission: 10/20/2023  Date of service 10/21/2023      Subjective      Chief Complaint: Chest congestion    Patient Reports no chest pain.  Mild shortness of breath.  No wheezing.  No definite chest pain.  No dizziness no lightheadedness.  No abdominal pain nausea or vomiting.  No fever or chills.  No body aches.  No hemoptysis    ROS A 12 point review of system was done and was negative except as mentioned above      Objective      Vitals:   Temp:  [97.4 °F (36.3 °C)-98.2 °F (36.8 °C)] 98.1 °F (36.7 °C)  Heart Rate:  [] 99  Resp:  [18-25] 20  BP: (101-129)/(55-74) 103/60    Physical Exam  Constitutional:       Appearance: Normal appearance.   HENT:      Head: Normocephalic and atraumatic.      Nose: Nose normal.   Cardiovascular:      Rate and Rhythm: Normal rate.      Heart sounds: Normal heart sounds.   Pulmonary:      Effort: Pulmonary effort is normal. No respiratory distress.      Breath sounds: No stridor. Wheezing and rhonchi present. No rales.   Chest:      Chest wall: No tenderness.   Abdominal:      General: Abdomen is flat. There is no distension.      Palpations: Abdomen is soft. There is no mass.      Tenderness: There is no abdominal tenderness.      Hernia: No hernia is present.   Musculoskeletal:      Cervical back: Normal range of motion.   Skin:     General: Skin is warm and dry.   Neurological:      General: No focal deficit present.      Mental Status: He is alert.   Psychiatric:         Mood and Affect: Mood normal.         Behavior: Behavior normal.             Result Review    Result Review:  I have personally reviewed the results from the time of this admission to 10/21/2023 11:37 EDT and agree with these findings:  [x]  Laboratory  []  Microbiology  [x]  Radiology  []  EKG/Telemetry   []   Cardiology/Vascular   []  Pathology  []  Old records  []  Other:            Assessment & Plan      Brief Patient Summary:   Romero Maciel is a 58 y.o. male with past medical history smoking 2 pack/day smoker for the past 30 years who presented to [facility] on 10/20/2023 complaining of shortness of breath, cough, decreased appetite and abnormal chest xray. He additionally reports 20 lbs unintentional weight  loss over the past 6 weeks, and his voice being hoarse over the past couple days. He was seen yesterday by pcp who ordered a chest xray he had completed at [facility] with radiology report with him that looks like a right upper lobe mass versus infiltrate and was advised to come to the ED for further evaluation. He denies fever, or chest pain, nausea, vomiting, diarrhea, or urinary complaints.      In the ED, CT of chest interpreted by radiologist shows a very large right upper lobe mass which extends into the right hilum and mediastinum with gross mediastinal adenopathy. Findings are compatible with malignancy. Infiltrates at the periphery of the lesion are compatible with postobstructive infiltrates.       azithromycin, 500 mg, Intravenous, Q24H  cefTRIAXone, 1,000 mg, Intravenous, Q24H  doxepin, 75 mg, Oral, Nightly  guaiFENesin, 1,200 mg, Oral, BID  ipratropium-albuterol, 3 mL, Nebulization, Q4H - RT  mirtazapine, 45 mg, Oral, Nightly  nicotine, 1 patch, Transdermal, Q24H  senna-docusate sodium, 2 tablet, Oral, BID  sodium chloride, 10 mL, Intravenous, Q12H             Active Hospital Problems:  Active Hospital Problems    Diagnosis     **Lung mass      Plan:   Lung mass  CT report reviewed.  Pulmonary and oncology already consulted.  Biopsy for further diagnosis and staging and management.    Pneumonia  Postobstructive pneumonia.  Continue IV antibiotics.    COPD/tobacco abuse  Bronchodilator therapy.  Nicotine patch advised patient to quit.    Electrolyte imbalance.  Monitor  electrolytes.  Follow-up BMP    Patient and brother at the bedside was updated with plan of care.  All questions answered    DVT prophylaxis:  Mechanical DVT prophylaxis orders are present.    CODE STATUS:    Code Status (Patient has no pulse and is not breathing): CPR (Attempt to Resuscitate)  Medical Interventions (Patient has pulse or is breathing): Full Support      Disposition:  I expect patient to be discharged 2 to 3 days          Electronically signed by Jeovanny Griggs MD, 10/21/23, 11:37 EDT.  [facility] Hospitalist Team

## 2023-10-21 NOTE — PLAN OF CARE
Problem: Adult Inpatient Plan of Care  Goal: Plan of Care Review  Outcome: Ongoing, Progressing  Flowsheets (Taken 10/21/2023 0109)  Progress: no change  Plan of Care Reviewed With: patient  Goal: Patient-Specific Goal (Individualized)  Outcome: Ongoing, Progressing  Goal: Absence of Hospital-Acquired Illness or Injury  Outcome: Ongoing, Progressing  Intervention: Identify and Manage Fall Risk  Recent Flowsheet Documentation  Taken 10/21/2023 0000 by Belgica Harman RN  Safety Promotion/Fall Prevention: safety round/check completed  Taken 10/20/2023 2200 by Belgica Harman RN  Safety Promotion/Fall Prevention: safety round/check completed  Taken 10/20/2023 2000 by Belgica Harman RN  Safety Promotion/Fall Prevention: safety round/check completed  Intervention: Prevent Skin Injury  Recent Flowsheet Documentation  Taken 10/21/2023 0000 by Belgica Harman RN  Body Position: position changed independently  Taken 10/20/2023 2200 by Belgica Harman RN  Body Position: position changed independently  Taken 10/20/2023 2000 by Belgica Harman RN  Body Position: position changed independently  Intervention: Prevent and Manage VTE (Venous Thromboembolism) Risk  Recent Flowsheet Documentation  Taken 10/21/2023 0000 by Belgica Harman RN  Activity Management: ambulated in room  Taken 10/20/2023 2200 by Belgica Harman RN  Activity Management: ambulated in room  Taken 10/20/2023 2000 by Belgica Harman RN  Activity Management: ambulated in room  Goal: Optimal Comfort and Wellbeing  Outcome: Ongoing, Progressing  Intervention: Provide Person-Centered Care  Recent Flowsheet Documentation  Taken 10/21/2023 0000 by Belgica Harman RN  Trust Relationship/Rapport:   care explained   choices provided   emotional support provided   empathic listening provided   questions answered   questions encouraged   reassurance provided   thoughts/feelings acknowledged  Taken 10/20/2023 2000 by Belgica Harman RN  Trust Relationship/Rapport:   care  explained   choices provided   emotional support provided   empathic listening provided   questions answered   questions encouraged   reassurance provided   thoughts/feelings acknowledged  Goal: Readiness for Transition of Care  Outcome: Ongoing, Progressing     Problem: Breathing Pattern Ineffective  Goal: Effective Breathing Pattern  Outcome: Ongoing, Progressing  Intervention: Promote Improved Breathing Pattern  Recent Flowsheet Documentation  Taken 10/21/2023 0000 by Belgica Harman RN  Head of Bed (HOB) Positioning:   HOB elevated   HOB at 30 degrees  Taken 10/20/2023 2200 by Belgica Harman RN  Head of Bed (HOB) Positioning:   HOB elevated   HOB at 30 degrees  Taken 10/20/2023 2000 by Belgica Harman RN  Head of Bed (HOB) Positioning:   HOB elevated   HOB at 30 degrees     Problem: Fall Injury Risk  Goal: Absence of Fall and Fall-Related Injury  Outcome: Ongoing, Progressing  Intervention: Identify and Manage Contributors  Recent Flowsheet Documentation  Taken 10/21/2023 0000 by Belgica Harman RN  Medication Review/Management: medications reviewed  Taken 10/20/2023 2200 by Belgica Harman RN  Medication Review/Management: medications reviewed  Taken 10/20/2023 2000 by Belgica Harman RN  Medication Review/Management: medications reviewed  Intervention: Promote Injury-Free Environment  Recent Flowsheet Documentation  Taken 10/21/2023 0000 by Belgica Harman RN  Safety Promotion/Fall Prevention: safety round/check completed  Taken 10/20/2023 2200 by Belgica Harman RN  Safety Promotion/Fall Prevention: safety round/check completed  Taken 10/20/2023 2000 by Belgica Harman RN  Safety Promotion/Fall Prevention: safety round/check completed   Goal Outcome Evaluation:  Plan of Care Reviewed With: patient        Progress: no change       Patient alert and oriented, patient does not have any pain issues, patient on room air, patient uses urinal without complications.  Patient on breathing treatments q 4 hours.   Patients brother at bedside all questions and concerns addressed and answered. RN told patients brother if he got a room tonight I would call and update him on the room number.

## 2023-10-21 NOTE — PLAN OF CARE
Problem: Adult Inpatient Plan of Care  Goal: Plan of Care Review  10/21/2023 1145 by Andree Epstein, RN  Outcome: Ongoing, Progressing  Flowsheets (Taken 10/21/2023 1145)  Progress: no change  Plan of Care Reviewed With: patient  10/21/2023 1145 by Andree Epstein, RN  Outcome: Ongoing, Progressing  Flowsheets (Taken 10/21/2023 1145)  Progress: no change  Plan of Care Reviewed With: patient   Goal Outcome Evaluation:

## 2023-10-21 NOTE — CONSULTS
Hematology/Oncology Inpatient Consultation    Patient name: Romero Maciel  : 1965  MRN: [id number]  Referring Provider: Dr. Griggs  Reason for Consultation: lung mass    Chief complaint:     History of present illness:    Romero Maciel is a 58 y.o. male who presented to [facility] on 10/20/2023 with complaints of cough, abnormal chest xray.    Patient is a 58-year-old male with history of 60 pack year smoking who has been complaining of shortness of breath, cough weight loss of over 20 pounds over the last few months.  He also started to have hoarseness of voice.  He was seen by PCP and a chest x-ray was ordered which was abnormal patient was advised to come to the ER    10/20/2023 CT chest with very large right upper lobe mass which extends into the right hilum and mediastinum with gross mediastinal adenopathy findings compatible with malignancy.  Postobstructive infiltrates.  Minimal right pleural effusion    10/21/23  Hematology/Oncology was consulted     He/She  has no past medical history on file.    PCP: Jennifer Maynard APRN    History:  History reviewed. No pertinent past medical history., History reviewed. No pertinent surgical history., History reviewed. No pertinent family history.,   Social History     Tobacco Use    Smoking status: Every Day     Packs/day: 1.00     Years: 15.00     Additional pack years: 0.00     Total pack years: 15.00     Types: Cigarettes     Passive exposure: Current    Smokeless tobacco: Current   Vaping Use    Vaping Use: Never used   Substance Use Topics    Alcohol use: Never    Drug use: Never   , (Not in a hospital admission)  , Scheduled Meds:  cefTRIAXone, 1,000 mg, Intravenous, Q24H  doxepin, 75 mg, Oral, Nightly  guaiFENesin, 1,200 mg, Oral, BID  ipratropium-albuterol, 3 mL, Nebulization, Q4H - RT  LORazepam, 1 mg, Oral, Once  mirtazapine, 45 mg, Oral, Nightly  nicotine, 1 patch, Transdermal, Q24H  potassium chloride ER, 40 mEq, Oral,  "Q4H  senna-docusate sodium, 2 tablet, Oral, BID  sodium chloride, 10 mL, Intravenous, Q12H    , Continuous Infusions:   , PRN Meds:    acetaminophen **OR** acetaminophen **OR** acetaminophen    aluminum-magnesium hydroxide-simethicone    senna-docusate sodium **AND** polyethylene glycol **AND** bisacodyl **AND** bisacodyl    Calcium Replacement - Follow Nurse / BPA Driven Protocol    clonazePAM    Magnesium Standard Dose Replacement - Follow Nurse / BPA Driven Protocol    melatonin    ondansetron **OR** ondansetron    Phosphorus Replacement - Follow Nurse / BPA Driven Protocol    Potassium Replacement - Follow Nurse / BPA Driven Protocol    [COMPLETED] Insert Peripheral IV **AND** sodium chloride    sodium chloride    sodium chloride   Allergies:  Penicillins    Subjective     ROS:  Review of Systems     Objective   Vital Signs:   /63 (BP Location: Right arm, Patient Position: Lying)   Pulse 94   Temp 97.7 °F (36.5 °C) (Oral)   Resp 18   Ht 175.3 cm (69\")   Wt 65.7 kg (144 lb 13.5 oz)   SpO2 97%   BMI 21.39 kg/m²     Physical Exam: (performed by MD)  Physical Exam    Results Review:  Lab Results (last 48 hours)       Procedure Component Value Units Date/Time    Blood Culture - Blood, Arm, Right [[id number]]  (Normal) Collected: 10/20/23 1536    Specimen: Blood from Arm, Right Updated: 10/21/23 1546     Blood Culture No growth at 24 hours    Blood Culture - Blood, Arm, Left [[id number]]  (Normal) Collected: 10/20/23 1459    Specimen: Blood from Arm, Left Updated: 10/21/23 1516     Blood Culture No growth at 24 hours    Basic Metabolic Panel [[id number]]  (Abnormal) Collected: 10/21/23 1212    Specimen: Blood Updated: 10/21/23 1245     Glucose 89 mg/dL      BUN 6 mg/dL      Creatinine 0.95 mg/dL      Sodium 135 mmol/L      Potassium 3.5 mmol/L      Chloride 100 mmol/L      CO2 23.0 mmol/L      Calcium 8.7 mg/dL      BUN/Creatinine Ratio 6.3     Anion Gap 12.0 mmol/L      eGFR 92.2 mL/min/1.73     Narrative:  "     GFR Normal >60  Chronic Kidney Disease <60  Kidney Failure <15      CBC Auto Differential [[id number]]  (Abnormal) Collected: 10/21/23 1212    Specimen: Blood Updated: 10/21/23 1222     WBC 5.30 10*3/mm3      RBC 3.44 10*6/mm3      Hemoglobin 9.7 g/dL      Hematocrit 29.1 %      MCV 84.5 fL      MCH 28.1 pg      MCHC 33.3 g/dL      RDW 14.9 %      RDW-SD 45.9 fl      MPV 7.2 fL      Platelets 267 10*3/mm3      Neutrophil % 74.3 %      Lymphocyte % 18.8 %      Monocyte % 4.2 %      Eosinophil % 1.8 %      Basophil % 0.9 %      Neutrophils, Absolute 3.90 10*3/mm3      Lymphocytes, Absolute 1.00 10*3/mm3      Monocytes, Absolute 0.20 10*3/mm3      Eosinophils, Absolute 0.10 10*3/mm3      Basophils, Absolute 0.00 10*3/mm3      nRBC 0.1 /100 WBC     Vitamin B12 [[id number]] Collected: 10/21/23 1212    Specimen: Blood Updated: 10/21/23 1217    Folate [[id number]] Collected: 10/21/23 1212    Specimen: Blood Updated: 10/21/23 1217    Respiratory Panel PCR w/COVID-19(SARS-CoV-2) KEESHA/CHAZ/JOSE/PAD/COR/MAD/PEDRO In-House, NP Swab in UTM/VTM, 3-4 HR TAT - Swab, Nasopharynx [[id number]]  (Normal) Collected: 10/20/23 1951    Specimen: Swab from Nasopharynx Updated: 10/20/23 2057     ADENOVIRUS, PCR Not Detected     Coronavirus 229E Not Detected     Coronavirus HKU1 Not Detected     Coronavirus NL63 Not Detected     Coronavirus OC43 Not Detected     COVID19 Not Detected     Human Metapneumovirus Not Detected     Human Rhinovirus/Enterovirus Not Detected     Influenza A PCR Not Detected     Influenza B PCR Not Detected     Parainfluenza Virus 1 Not Detected     Parainfluenza Virus 2 Not Detected     Parainfluenza Virus 3 Not Detected     Parainfluenza Virus 4 Not Detected     RSV, PCR Not Detected     Bordetella pertussis pcr Not Detected     Bordetella parapertussis PCR Not Detected     Chlamydophila pneumoniae PCR Not Detected     Mycoplasma pneumo by PCR Not Detected    Narrative:      In the setting of a positive respiratory panel  "with a viral infection PLUS a negative procalcitonin without other underlying concern for bacterial infection, consider observing off antibiotics or discontinuation of antibiotics and continue supportive care. If the respiratory panel is positive for atypical bacterial infection (Bordetella pertussis, Chlamydophila pneumoniae, or Mycoplasma pneumoniae), consider antibiotic de-escalation to target atypical bacterial infection.    Ferritin [[id number]]  (Abnormal) Collected: 10/20/23 1459    Specimen: Blood Updated: 10/20/23 2018     Ferritin 546.30 ng/mL     Narrative:      Results may be falsely decreased if patient taking Biotin.      Iron [[id number]]  (Abnormal) Collected: 10/20/23 1459    Specimen: Blood Updated: 10/20/23 2011     Iron 37 mcg/dL     POC Lactate [[id number]]  (Normal) Collected: 10/20/23 1638    Specimen: Blood Updated: 10/20/23 1640     Lactate 2.0 mmol/L      Comment: Serial Number: [id number]:  [id number]       Procalcitonin [[id number]]  (Abnormal) Collected: 10/20/23 1459    Specimen: Blood Updated: 10/20/23 1533     Procalcitonin 0.27 ng/mL     Narrative:      As a Marker for Sepsis (Non-Neonates):    1. <0.5 ng/mL represents a low risk of severe sepsis and/or septic shock.  2. >2 ng/mL represents a high risk of severe sepsis and/or septic shock.    As a Marker for Lower Respiratory Tract Infections that require antibiotic therapy:    PCT on Admission    Antibiotic Therapy       6-12 Hrs later    >0.5                Strongly Recommended  >0.25 - <0.5        Recommended   0.1 - 0.25          Discouraged              Remeasure/reassess PCT  <0.1                Strongly Discouraged     Remeasure/reassess PCT    As 28 day mortality risk marker: \"Change in Procalcitonin Result\" (>80% or <=80%) if Day 0 (or Day 1) and Day 4 values are available. Refer to http://www.[employer]-pct-calculator.com    Change in PCT <=80%  A decrease of PCT levels below or equal to 80% defines a positive change in PCT " test result representing a higher risk for 28-day all-cause mortality of patients diagnosed with severe sepsis for septic shock.    Change in PCT >80%  A decrease of PCT levels of more than 80% defines a negative change in PCT result representing a lower risk for 28-day all-cause mortality of patients diagnosed with severe sepsis or septic shock.       Comprehensive Metabolic Panel [[id number]]  (Abnormal) Collected: 10/20/23 1459    Specimen: Blood Updated: 10/20/23 1526     Glucose 108 mg/dL      BUN 8 mg/dL      Creatinine 1.13 mg/dL      Sodium 132 mmol/L      Potassium 3.4 mmol/L      Chloride 93 mmol/L      CO2 24.0 mmol/L      Calcium 9.6 mg/dL      Total Protein 7.7 g/dL      Albumin 3.6 g/dL      ALT (SGPT) 25 U/L      AST (SGOT) 49 U/L      Alkaline Phosphatase 158 U/L      Total Bilirubin 0.4 mg/dL      Globulin 4.1 gm/dL      A/G Ratio 0.9 g/dL      BUN/Creatinine Ratio 7.1     Anion Gap 15.0 mmol/L      eGFR 74.9 mL/min/1.73     Narrative:      GFR Normal >60  Chronic Kidney Disease <60  Kidney Failure <15      CBC & Differential [[id number]]  (Abnormal) Collected: 10/20/23 1459    Specimen: Blood Updated: 10/20/23 1507    Narrative:      The following orders were created for panel order CBC & Differential.  Procedure                               Abnormality         Status                     ---------                               -----------         ------                     CBC Auto Differential[[id number]]        Abnormal            Final result                 Please view results for these tests on the individual orders.    CBC Auto Differential [[id number]]  (Abnormal) Collected: 10/20/23 1459    Specimen: Blood Updated: 10/20/23 1507     WBC 8.00 10*3/mm3      RBC 3.97 10*6/mm3      Hemoglobin 11.4 g/dL      Hematocrit 33.6 %      MCV 84.5 fL      MCH 28.8 pg      MCHC 34.1 g/dL      RDW 14.7 %      RDW-SD 45.9 fl      MPV 7.5 fL      Platelets 272 10*3/mm3      Neutrophil % 83.2 %      Lymphocyte  % 11.9 %      Monocyte % 3.2 %      Eosinophil % 0.6 %      Basophil % 1.1 %      Neutrophils, Absolute 6.70 10*3/mm3      Lymphocytes, Absolute 1.00 10*3/mm3      Monocytes, Absolute 0.30 10*3/mm3      Eosinophils, Absolute 0.10 10*3/mm3      Basophils, Absolute 0.10 10*3/mm3      nRBC 0.2 /100 WBC              Imaging Reviewed:   CT Chest With Contrast Diagnostic    Result Date: 10/20/2023  Impression: Very large right upper lobe mass which extends into the right hilum and mediastinum with gross mediastinal adenopathy. Findings are compatible with malignancy. Infiltrates at the periphery of the lesion are compatible with postobstructive infiltrates. Minimal right pleural effusion. Electronically Signed: Anne-Marie Koroma MD  10/20/2023 4:10 PM EDT  Workstation ID: [id number]          Assessment & Plan     Patient is a 58-year-old male with chronic smoking history who presents with cough, shortness of breath CT imaging concerning for a large right upper lobe mass mediastinal adenopathy    Lung mass  Large right upper lobe lung mass with infiltrate in the right hilum  Significantly enlarged mediastinal adenopathy likely lung cancer with metastasis to the lymph node  Pulmonary has been consulted plan for bronchoscopy  We will send biopsy sample for NGS, PD-L1 testing if non-small cell lung cancer  Await bronchoscopy and biopsy results  Complete CT abdomen pelvis to complete staging, will get MRI brain as well.     Anemia  Normocytic anemia  Iron level is low however ferritin is high which could be an acute phase reactant  Monitor CBC obtain complete iron profile including iron saturation B12 and folate levels    Electronically signed by Jeanette Rubio MD, 10/21/23, 4:28 PM EDT.        Thank you for this consult. We will be happy to follow along with you.

## 2023-10-21 NOTE — CONSULTS
Group: [facility]         CONSULT NOTE    Patient Identification:  Romero Maciel  58 y.o.  male  1965  [id number]            Requesting physician: Attending physician    Reason for Consultation: Right upper lobe mass      History of Present Illness:  58-year-old male with history of smoking 2 packs a day for the last 30 years and anxiety disorder who presented 10/20/2023 with complaints of progressive shortness of breath, cough, decreased appetite and progressive weight loss of around 20 pounds in the last 6 weeks, hoarseness of voice.  Chest x-ray done in [facility] was reported to have right upper lobe mass.  CT scan in ER showed large right upper lobe mass extending to right hilum and mediastinum with mediastinal lymphadenopathy.  Patient is afebrile, hemodynamically stable, oxygenating well on room air.  WBCs 5.3, hemoglobin initially 11.4 and repeated today 9.7    Assessment:  Lung mass: Right upper lobe infiltrate in right hilum with enlarged mediastinal lymphadenopathy compatible with bronchogenic malignancy  Postobstructive pneumonia  Anemia  Tobacco smoking  History of anxiety and depression      Recommendations:  Schedule bronchoscopy 10/22/2023  Oxygen supplement and titration to maintain saturation 90 to 95%  Bronchodilators  Antibiotics  Mucinex  Smoking cessation counseling, nicotine patch            Review of Sytems:  Constitutional: Negative for chills, fever and positive for weight loss and malaise/fatigue.   HENT: Negative.    Eyes: Negative.    Cardiovascular: Negative.    Respiratory: Positive for cough and shortness of breath.    Skin: Negative.    Musculoskeletal: Negative.    Gastrointestinal: Positive for decreased p.o. intake  Genitourinary: Negative.    Neurological: Negative.    Psychiatric/Behavioral: Negative.    Past Medical History:  History reviewed. No pertinent past medical history.    Past Surgical History:  History reviewed. No pertinent surgical  "history.     Home Meds:  (Not in a hospital admission)      Allergies:  Allergies   Allergen Reactions    Penicillins Anaphylaxis       Social History:   Social History     Socioeconomic History    Marital status: Single   Tobacco Use    Smoking status: Every Day     Packs/day: 1.00     Years: 15.00     Additional pack years: 0.00     Total pack years: 15.00     Types: Cigarettes     Passive exposure: Current    Smokeless tobacco: Current   Vaping Use    Vaping Use: Never used   Substance and Sexual Activity    Alcohol use: Never    Drug use: Never    Sexual activity: Defer       Family History:  History reviewed. No pertinent family history.    Physical Exam:  /63 (BP Location: Right arm, Patient Position: Lying)   Pulse 99   Temp 97.7 °F (36.5 °C) (Oral)   Resp 19   Ht 175.3 cm (69\")   Wt 65.7 kg (144 lb 13.5 oz)   SpO2 96%   BMI 21.39 kg/m²  Body mass index is 21.39 kg/m². 96% 65.7 kg (144 lb 13.5 oz)  General Appearance:  Alert   HEENT:  Normocephalic, without obvious abnormality, Conjunctiva/corneas clear,.   Nares normal, no drainage     Neck:  Supple, symmetrical, trachea midline. No JVD.  Lungs /Chest wall:   Right-sided rhonchi, respirations unlabored, symmetrical wall movement.     Heart:  Regular rate and rhythm, S1 S2 normal  Abdomen: Soft, non-tender, no masses, no organomegaly.    Extremities: No edema, no clubbing or cyanosis    LABS:  Lab Results   Component Value Date    CALCIUM 8.7 10/21/2023     Results from last 7 days   Lab Units 10/21/23  1212 10/20/23  1459   SODIUM mmol/L 135* 132*   POTASSIUM mmol/L 3.5 3.4*   CHLORIDE mmol/L 100 93*   CO2 mmol/L 23.0 24.0   BUN mg/dL 6 8   CREATININE mg/dL 0.95 1.13   GLUCOSE mg/dL 89 108*   CALCIUM mg/dL 8.7 9.6   WBC 10*3/mm3 5.30 8.00   HEMOGLOBIN g/dL 9.7* 11.4*   PLATELETS 10*3/mm3 267 272   ALT (SGPT) U/L  --  25   AST (SGOT) U/L  --  49*   PROCALCITONIN ng/mL  --  0.27*     No results found for: \"CKTOTAL\", \"CKMB\", \"CKMBINDEX\", " "\"TROPONINI\", \"TROPONINT\"          Results from last 7 days   Lab Units 10/20/23  1638 10/20/23  1459   PROCALCITONIN ng/mL  --  0.27*   LACTATE mmol/L 2.0  --          Results from last 7 days   Lab Units 10/20/23  1951   ADENOVIRUS DETECTION BY PCR  Not Detected   CORONAVIRUS 229E  Not Detected   CORONAVIRUS HKU1  Not Detected   CORONAVIRUS NL63  Not Detected   CORONAVIRUS OC43  Not Detected   HUMAN METAPNEUMOVIRUS  Not Detected   HUMAN RHINOVIRUS/ENTEROVIRUS  Not Detected   INFLUENZA B PCR  Not Detected   PARAINFLUENZA 1  Not Detected   PARAINFLUENZA VIRUS 2  Not Detected   PARAINFLUENZA VIRUS 3  Not Detected   PARAINFLUENZA VIRUS 4  Not Detected   BORDETELLA PERTUSSIS PCR  Not Detected   CHLAMYDOPHILA PNEUMONIAE PCR  Not Detected   MYCOPLAMA PNEUMO PCR  Not Detected   INFLUENZA A PCR  Not Detected   RSV, PCR  Not Detected             No results found for: \"TSH\"  Estimated Creatinine Clearance: 77.8 mL/min (by C-G formula based on SCr of 0.95 mg/dL).         Imaging:  Imaging Results (Last 24 Hours)       Procedure Component Value Units Date/Time    CT Chest With Contrast Diagnostic [[id number]] Collected: 10/20/23 1603     Updated: 10/20/23 1612    Narrative:      CT CHEST W CONTRAST DIAGNOSTIC    Date of Exam: 10/20/2023 3:40 PM EDT    Indication: abn xr at [facility] yesterday; hx smoking; 20# weight loss.    Comparison: None available.    Technique: Axial CT images were obtained of the chest after the uneventful intravenous administration of iodinated contrast.  Sagittal and coronal reconstructions were performed.  Automated exposure control and iterative reconstruction methods were used.      Findings:  There is a very large mass occupying the right upper lobe posteriorly and laterally. There is infiltrate at the periphery of the mass. The mass extends into the right hilum and mediastinum with gross hilar or mediastinal adenopathy which is confluent   with the mass. The right pulmonary artery is compressed and " narrowed by the adenopathy and mass. The bronchial tree to the right upper lobe is obliterated. There is severe narrowing of the bronchus intermedius and bronchial branches to the right middle   and right lower lobes. Secondary to the coalescent nature of the mass with the right hilum and the mediastinum and adjacent infiltrate, exact measurement is difficult. The right paramediastinal component which compresses the trachea measures   approximately 5.7 x 4.4 cm as measured on image #40 of series 2. The component within the right upper lobe posteriorly extending into the right hilum and posterior mediastinum measures approximately 11 x 7 cm as measured on image #40 of series #2, not   including some peripheral infiltrate. There is additional bulky adenopathy in the thoracic inlet. There is no left hilar adenopathy. There is a minimal right pleural effusion. There is no left pleural effusion. There are no adrenal masses. There are   cholecystectomy clips. A large cyst is noted in the right hepatic lobe. There is a small pericardial effusion. There are no nodules or masses in the left lung. There is mild posterior atelectasis of the right lung base.      Impression:      Impression:  Very large right upper lobe mass which extends into the right hilum and mediastinum with gross mediastinal adenopathy. Findings are compatible with malignancy. Infiltrates at the periphery of the lesion are compatible with postobstructive infiltrates.    Minimal right pleural effusion.        Electronically Signed: Anne-Marie Koroma MD    10/20/2023 4:10 PM EDT    Workstation ID: [id number]              Current Meds:   SCHEDULE  azithromycin, 500 mg, Intravenous, Q24H  cefTRIAXone, 1,000 mg, Intravenous, Q24H  doxepin, 75 mg, Oral, Nightly  guaiFENesin, 1,200 mg, Oral, BID  ipratropium-albuterol, 3 mL, Nebulization, Q4H - RT  mirtazapine, 45 mg, Oral, Nightly  nicotine, 1 patch, Transdermal, Q24H  potassium chloride ER, 40 mEq, Oral,  Q4H  senna-docusate sodium, 2 tablet, Oral, BID  sodium chloride, 10 mL, Intravenous, Q12H      Infusions     PRNs    acetaminophen **OR** acetaminophen **OR** acetaminophen    aluminum-magnesium hydroxide-simethicone    senna-docusate sodium **AND** polyethylene glycol **AND** bisacodyl **AND** bisacodyl    Calcium Replacement - Follow Nurse / BPA Driven Protocol    clonazePAM    Magnesium Standard Dose Replacement - Follow Nurse / BPA Driven Protocol    melatonin    ondansetron **OR** ondansetron    Phosphorus Replacement - Follow Nurse / BPA Driven Protocol    Potassium Replacement - Follow Nurse / BPA Driven Protocol    [COMPLETED] Insert Peripheral IV **AND** sodium chloride    sodium chloride    sodium chloride        Genia Jenkins MD  10/21/2023  14:40 EDT      Much of this encounter note is an electronic transcription/translation of spoken language to printed text using Dragon Software.

## 2023-10-22 ENCOUNTER — APPOINTMENT (OUTPATIENT)
Dept: MRI IMAGING | Facility: HOSPITAL | Age: 58
End: 2023-10-22
Payer: COMMERCIAL

## 2023-10-22 ENCOUNTER — ANESTHESIA (OUTPATIENT)
Dept: GASTROENTEROLOGY | Facility: HOSPITAL | Age: 58
End: 2023-10-22
Payer: COMMERCIAL

## 2023-10-22 ENCOUNTER — ANESTHESIA EVENT (OUTPATIENT)
Dept: GASTROENTEROLOGY | Facility: HOSPITAL | Age: 58
End: 2023-10-22
Payer: COMMERCIAL

## 2023-10-22 ENCOUNTER — APPOINTMENT (OUTPATIENT)
Dept: GENERAL RADIOLOGY | Facility: HOSPITAL | Age: 58
End: 2023-10-22
Payer: COMMERCIAL

## 2023-10-22 LAB
ANION GAP SERPL CALCULATED.3IONS-SCNC: 10 MMOL/L (ref 5–15)
B PARAPERT DNA SPEC QL NAA+PROBE: NOT DETECTED
B PERT DNA SPEC QL NAA+PROBE: NOT DETECTED
BASOPHILS # BLD AUTO: 0 10*3/MM3 (ref 0–0.2)
BASOPHILS NFR BLD AUTO: 0.8 % (ref 0–1.5)
BUN SERPL-MCNC: 4 MG/DL (ref 6–20)
BUN/CREAT SERPL: 4.7 (ref 7–25)
C PNEUM DNA NPH QL NAA+NON-PROBE: NOT DETECTED
CALCIUM SPEC-SCNC: 8.5 MG/DL (ref 8.6–10.5)
CHLORIDE SERPL-SCNC: 104 MMOL/L (ref 98–107)
CO2 SERPL-SCNC: 22 MMOL/L (ref 22–29)
CREAT SERPL-MCNC: 0.86 MG/DL (ref 0.76–1.27)
DEPRECATED RDW RBC AUTO: 47.3 FL (ref 37–54)
EGFRCR SERPLBLD CKD-EPI 2021: 99.7 ML/MIN/1.73
EOSINOPHIL # BLD AUTO: 0.2 10*3/MM3 (ref 0–0.4)
EOSINOPHIL NFR BLD AUTO: 3.2 % (ref 0.3–6.2)
ERYTHROCYTE [DISTWIDTH] IN BLOOD BY AUTOMATED COUNT: 15 % (ref 12.3–15.4)
FLUAV SUBTYP SPEC NAA+PROBE: NOT DETECTED
FLUBV RNA ISLT QL NAA+PROBE: NOT DETECTED
GLUCOSE SERPL-MCNC: 94 MG/DL (ref 65–99)
HADV DNA SPEC NAA+PROBE: NOT DETECTED
HCOV 229E RNA SPEC QL NAA+PROBE: NOT DETECTED
HCOV HKU1 RNA SPEC QL NAA+PROBE: NOT DETECTED
HCOV NL63 RNA SPEC QL NAA+PROBE: NOT DETECTED
HCOV OC43 RNA SPEC QL NAA+PROBE: NOT DETECTED
HCT VFR BLD AUTO: 29.5 % (ref 37.5–51)
HGB BLD-MCNC: 10.1 G/DL (ref 13–17.7)
HMPV RNA NPH QL NAA+NON-PROBE: NOT DETECTED
HPIV1 RNA ISLT QL NAA+PROBE: NOT DETECTED
HPIV2 RNA SPEC QL NAA+PROBE: NOT DETECTED
HPIV3 RNA NPH QL NAA+PROBE: NOT DETECTED
HPIV4 P GENE NPH QL NAA+PROBE: NOT DETECTED
IRON 24H UR-MRATE: 34 MCG/DL (ref 59–158)
IRON SATN MFR SERPL: 20 % (ref 20–50)
LYMPHOCYTES # BLD AUTO: 1.4 10*3/MM3 (ref 0.7–3.1)
LYMPHOCYTES NFR BLD AUTO: 26.2 % (ref 19.6–45.3)
M PNEUMO IGG SER IA-ACNC: NOT DETECTED
MCH RBC QN AUTO: 28.7 PG (ref 26.6–33)
MCHC RBC AUTO-ENTMCNC: 34.1 G/DL (ref 31.5–35.7)
MCV RBC AUTO: 84.3 FL (ref 79–97)
MONOCYTES # BLD AUTO: 0.3 10*3/MM3 (ref 0.1–0.9)
MONOCYTES NFR BLD AUTO: 5.3 % (ref 5–12)
NEUTROPHILS NFR BLD AUTO: 3.4 10*3/MM3 (ref 1.7–7)
NEUTROPHILS NFR BLD AUTO: 64.5 % (ref 42.7–76)
NRBC BLD AUTO-RTO: 0.1 /100 WBC (ref 0–0.2)
PLATELET # BLD AUTO: 299 10*3/MM3 (ref 140–450)
PMV BLD AUTO: 7.4 FL (ref 6–12)
POTASSIUM SERPL-SCNC: 4.7 MMOL/L (ref 3.5–5.2)
RBC # BLD AUTO: 3.5 10*6/MM3 (ref 4.14–5.8)
RHINOVIRUS RNA SPEC NAA+PROBE: NOT DETECTED
RSV RNA NPH QL NAA+NON-PROBE: NOT DETECTED
SARS-COV-2 RNA NPH QL NAA+NON-PROBE: NOT DETECTED
SODIUM SERPL-SCNC: 136 MMOL/L (ref 136–145)
TIBC SERPL-MCNC: 174 MCG/DL (ref 298–536)
TRANSFERRIN SERPL-MCNC: 117 MG/DL (ref 200–360)
WBC NRBC COR # BLD: 5.3 10*3/MM3 (ref 3.4–10.8)

## 2023-10-22 PROCEDURE — 25010000002 PROPOFOL 200 MG/20ML EMULSION: Performed by: NURSE ANESTHETIST, CERTIFIED REGISTERED

## 2023-10-22 PROCEDURE — 83540 ASSAY OF IRON: CPT | Performed by: INTERNAL MEDICINE

## 2023-10-22 PROCEDURE — 94761 N-INVAS EAR/PLS OXIMETRY MLT: CPT

## 2023-10-22 PROCEDURE — 80048 BASIC METABOLIC PNL TOTAL CA: CPT | Performed by: INTERNAL MEDICINE

## 2023-10-22 PROCEDURE — 87252 VIRUS INOCULATION TISSUE: CPT | Performed by: INTERNAL MEDICINE

## 2023-10-22 PROCEDURE — 0202U NFCT DS 22 TRGT SARS-COV-2: CPT | Performed by: INTERNAL MEDICINE

## 2023-10-22 PROCEDURE — 25810000003 SODIUM CHLORIDE 0.9 % SOLUTION: Performed by: NURSE ANESTHETIST, CERTIFIED REGISTERED

## 2023-10-22 PROCEDURE — 94799 UNLISTED PULMONARY SVC/PX: CPT

## 2023-10-22 PROCEDURE — 07978ZX DRAINAGE OF THORAX LYMPHATIC, VIA NATURAL OR ARTIFICIAL OPENING ENDOSCOPIC APPROACH, DIAGNOSTIC: ICD-10-PCS | Performed by: INTERNAL MEDICINE

## 2023-10-22 PROCEDURE — 87206 SMEAR FLUORESCENT/ACID STAI: CPT | Performed by: INTERNAL MEDICINE

## 2023-10-22 PROCEDURE — 71045 X-RAY EXAM CHEST 1 VIEW: CPT

## 2023-10-22 PROCEDURE — 94664 DEMO&/EVAL PT USE INHALER: CPT

## 2023-10-22 PROCEDURE — 88305 TISSUE EXAM BY PATHOLOGIST: CPT | Performed by: INTERNAL MEDICINE

## 2023-10-22 PROCEDURE — 87116 MYCOBACTERIA CULTURE: CPT | Performed by: INTERNAL MEDICINE

## 2023-10-22 PROCEDURE — 25010000002 LORAZEPAM PER 2 MG: Performed by: INTERNAL MEDICINE

## 2023-10-22 PROCEDURE — 88173 CYTOPATH EVAL FNA REPORT: CPT | Performed by: INTERNAL MEDICINE

## 2023-10-22 PROCEDURE — A9579 GAD-BASE MR CONTRAST NOS,1ML: HCPCS | Performed by: HOSPITALIST

## 2023-10-22 PROCEDURE — 88342 IMHCHEM/IMCYTCHM 1ST ANTB: CPT | Performed by: INTERNAL MEDICINE

## 2023-10-22 PROCEDURE — 0B9F8ZX DRAINAGE OF RIGHT LOWER LUNG LOBE, VIA NATURAL OR ARTIFICIAL OPENING ENDOSCOPIC, DIAGNOSTIC: ICD-10-PCS | Performed by: INTERNAL MEDICINE

## 2023-10-22 PROCEDURE — 84466 ASSAY OF TRANSFERRIN: CPT | Performed by: INTERNAL MEDICINE

## 2023-10-22 PROCEDURE — 88108 CYTOPATH CONCENTRATE TECH: CPT | Performed by: INTERNAL MEDICINE

## 2023-10-22 PROCEDURE — 99232 SBSQ HOSP IP/OBS MODERATE 35: CPT | Performed by: INTERNAL MEDICINE

## 2023-10-22 PROCEDURE — 88341 IMHCHEM/IMCYTCHM EA ADD ANTB: CPT | Performed by: INTERNAL MEDICINE

## 2023-10-22 PROCEDURE — 87102 FUNGUS ISOLATION CULTURE: CPT | Performed by: INTERNAL MEDICINE

## 2023-10-22 PROCEDURE — 25010000002 GADOTERIDOL PER 1 ML: Performed by: HOSPITALIST

## 2023-10-22 PROCEDURE — 0BB38ZX EXCISION OF RIGHT MAIN BRONCHUS, VIA NATURAL OR ARTIFICIAL OPENING ENDOSCOPIC, DIAGNOSTIC: ICD-10-PCS | Performed by: INTERNAL MEDICINE

## 2023-10-22 PROCEDURE — 70553 MRI BRAIN STEM W/O & W/DYE: CPT

## 2023-10-22 PROCEDURE — 25010000002 CEFTRIAXONE PER 250 MG

## 2023-10-22 PROCEDURE — 85025 COMPLETE CBC W/AUTO DIFF WBC: CPT | Performed by: INTERNAL MEDICINE

## 2023-10-22 PROCEDURE — 87798 DETECT AGENT NOS DNA AMP: CPT | Performed by: INTERNAL MEDICINE

## 2023-10-22 RX ORDER — PROPOFOL 10 MG/ML
INJECTION, EMULSION INTRAVENOUS AS NEEDED
Status: DISCONTINUED | OUTPATIENT
Start: 2023-10-22 | End: 2023-10-22 | Stop reason: SURG

## 2023-10-22 RX ORDER — SODIUM CHLORIDE 9 MG/ML
INJECTION, SOLUTION INTRAVENOUS CONTINUOUS PRN
Status: DISCONTINUED | OUTPATIENT
Start: 2023-10-22 | End: 2023-10-22 | Stop reason: SURG

## 2023-10-22 RX ORDER — FOLIC ACID 1 MG/1
1 TABLET ORAL DAILY
Status: DISCONTINUED | OUTPATIENT
Start: 2023-10-22 | End: 2023-10-23 | Stop reason: HOSPADM

## 2023-10-22 RX ORDER — LIDOCAINE HYDROCHLORIDE 20 MG/ML
INJECTION, SOLUTION INFILTRATION; PERINEURAL AS NEEDED
Status: DISCONTINUED | OUTPATIENT
Start: 2023-10-22 | End: 2023-10-22 | Stop reason: HOSPADM

## 2023-10-22 RX ORDER — LIDOCAINE HYDROCHLORIDE 20 MG/ML
INJECTION, SOLUTION EPIDURAL; INFILTRATION; INTRACAUDAL; PERINEURAL AS NEEDED
Status: DISCONTINUED | OUTPATIENT
Start: 2023-10-22 | End: 2023-10-22 | Stop reason: SURG

## 2023-10-22 RX ORDER — LIDOCAINE 50 MG/G
OINTMENT TOPICAL AS NEEDED
Status: DISCONTINUED | OUTPATIENT
Start: 2023-10-22 | End: 2023-10-22 | Stop reason: HOSPADM

## 2023-10-22 RX ORDER — LORAZEPAM 2 MG/ML
1 INJECTION INTRAMUSCULAR ONCE
Status: COMPLETED | OUTPATIENT
Start: 2023-10-22 | End: 2023-10-22

## 2023-10-22 RX ADMIN — GUAIFENESIN 1200 MG: 600 TABLET, EXTENDED RELEASE ORAL at 11:56

## 2023-10-22 RX ADMIN — PROPOFOL 20 MG: 10 INJECTION, EMULSION INTRAVENOUS at 09:39

## 2023-10-22 RX ADMIN — Medication 1 PATCH: at 17:29

## 2023-10-22 RX ADMIN — GADOTERIDOL 13 ML: 279.3 INJECTION, SOLUTION INTRAVENOUS at 16:15

## 2023-10-22 RX ADMIN — DOCUSATE SODIUM 50 MG AND SENNOSIDES 8.6 MG 2 TABLET: 8.6; 5 TABLET, FILM COATED ORAL at 11:56

## 2023-10-22 RX ADMIN — LORAZEPAM 1 MG: 2 INJECTION INTRAMUSCULAR; INTRAVENOUS at 15:23

## 2023-10-22 RX ADMIN — PROPOFOL 100 MG: 10 INJECTION, EMULSION INTRAVENOUS at 09:28

## 2023-10-22 RX ADMIN — CLONAZEPAM 1 MG: 1 TABLET ORAL at 11:57

## 2023-10-22 RX ADMIN — IPRATROPIUM BROMIDE AND ALBUTEROL SULFATE 3 ML: .5; 3 SOLUTION RESPIRATORY (INHALATION) at 07:15

## 2023-10-22 RX ADMIN — PROPOFOL 20 MG: 10 INJECTION, EMULSION INTRAVENOUS at 09:50

## 2023-10-22 RX ADMIN — Medication 10 ML: at 11:56

## 2023-10-22 RX ADMIN — GUAIFENESIN 1200 MG: 600 TABLET, EXTENDED RELEASE ORAL at 20:46

## 2023-10-22 RX ADMIN — PROPOFOL 20 MG: 10 INJECTION, EMULSION INTRAVENOUS at 09:53

## 2023-10-22 RX ADMIN — PROPOFOL 50 MG: 10 INJECTION, EMULSION INTRAVENOUS at 09:35

## 2023-10-22 RX ADMIN — CLONAZEPAM 1 MG: 1 TABLET ORAL at 20:52

## 2023-10-22 RX ADMIN — PROPOFOL 20 MG: 10 INJECTION, EMULSION INTRAVENOUS at 09:44

## 2023-10-22 RX ADMIN — LIDOCAINE HYDROCHLORIDE 50 MG: 20 INJECTION, SOLUTION EPIDURAL; INFILTRATION; INTRACAUDAL; PERINEURAL at 09:28

## 2023-10-22 RX ADMIN — CEFTRIAXONE 1000 MG: 1 INJECTION, POWDER, FOR SOLUTION INTRAMUSCULAR; INTRAVENOUS at 17:28

## 2023-10-22 RX ADMIN — IPRATROPIUM BROMIDE AND ALBUTEROL SULFATE 3 ML: .5; 3 SOLUTION RESPIRATORY (INHALATION) at 19:50

## 2023-10-22 RX ADMIN — SODIUM CHLORIDE: 9 INJECTION, SOLUTION INTRAVENOUS at 09:26

## 2023-10-22 RX ADMIN — DOXEPIN HYDROCHLORIDE 75 MG: 25 CAPSULE ORAL at 20:46

## 2023-10-22 RX ADMIN — FOLIC ACID 1 MG: 1 TABLET ORAL at 11:56

## 2023-10-22 RX ADMIN — MIRTAZAPINE 45 MG: 15 TABLET, FILM COATED ORAL at 20:46

## 2023-10-22 RX ADMIN — PROPOFOL 20 MG: 10 INJECTION, EMULSION INTRAVENOUS at 09:47

## 2023-10-22 RX ADMIN — IPRATROPIUM BROMIDE AND ALBUTEROL SULFATE 3 ML: .5; 3 SOLUTION RESPIRATORY (INHALATION) at 03:26

## 2023-10-22 NOTE — OP NOTE
Bronchoscopy Procedure Note    Procedure:  Bronchoscopy, Diagnostic  Bronchoalveolar lavage, BAL  Endobronchial biopsy with cryoprobe  Transbronchial FNA    Pre-Operative Diagnosis: Right lung mass with mediastinal and hilar lymphadenopathy    Post-Operative Diagnosis: Same    Indication: Right lung mass with mediastinal and hilar lymphadenopathy    Anesthesia: Monitored Anesthesia Care (MAC)    Procedure Details: Patient was consented for the procedure with all risk and benefit of the procedure explained in detail.  Patient was given the opportunity to ask questions and all concerns were answered.    Timeout was done in the standard manner   the bronchoscope was inserted into the main airway via the oropharynx. An anatomical survey was done of the main airways and the subsegmental bronchus of the 5 lobes.  The findings are consistent of endobronchial mass obstructing 80% of the right mainstem with the right upper lobe bronchus still patent 20% and the right lower lobe patent around 10%.  A bronchoalveolar lavage was performed in the right lower lobe using 90 mL normal saline instilled into the airways then aspirated back 30 mL.  Transbronchial FNA with a size 19-gauge needle was performed in 2 sites, and right hilar area x2, right paratracheal area x1.  Endobronchial cryobiopsy was performed in the right mainstem mass x5, the mild bleeding was controlled with instilling cold saline.    Estimated Blood Loss: Minimal           Specimens: BAL from right lower lobe, transbronchial FNA from 2 lymph node stations, endobronchial cryobiopsy from right mainstem mass                Complications:  None; patient tolerated the procedure well.           Disposition: PACU - hemodynamically stable.    Post op plan:  Resume p.o. after 2 hours  Follow-up results      Patient tolerated the procedure well.

## 2023-10-22 NOTE — ANESTHESIA POSTPROCEDURE EVALUATION
Patient: Romero Maciel    Procedure Summary       Date: 10/22/23 Room / Location: [facility] ENDOSCOPY 1 / [facility] ENDOSCOPY    Anesthesia Start: 0926 Anesthesia Stop: 1006    Procedure: BRONCHOSCOPY WITH CRYO BIOPSY X1 SYLVIA,  FINE NEEDLE ASPIRATION, AND BRONCHOALVEOLAR LAVAGE (Bronchus) Diagnosis:       Lung mass      (Lung mass [R91.8])    Surgeons: Genia Jenkins MD Provider: Katt Sims CRNA    Anesthesia Type: general, MAC ASA Status: 4 - Emergent            Anesthesia Type: general, MAC    Vitals  Vitals Value Taken Time   BP 98/57 10/22/23 1006   Temp     Pulse 102 10/22/23 1006   Resp     SpO2 94 % 10/22/23 1006   Vitals shown include unfiled device data.        Post Anesthesia Care and Evaluation    Patient location: endo.  Patient participation: complete - patient participated  Level of consciousness: responsive to light touch  Pain score: 3  Pain management: adequate    Airway patency: patent  Anesthetic complications: No anesthetic complications  PONV Status: none  Cardiovascular status: acceptable  Respiratory status: acceptable  Hydration status: acceptable  No anesthesia care post op

## 2023-10-22 NOTE — PROGRESS NOTES
[facility] Medicine Services   Daily Progress Note    Patient Name: Romero Maciel  : 1965  MRN: [id number]  Primary Care Physician:  Jennifer Maynard APRN  Date of admission: 10/20/2023  Date of service 10/21/2023      Subjective      Chief Complaint: Chest congestion    Patient seen and examined after bronchoscopy.  Patient denies any complaints at this time.  Vital signs reviewed and stable.  Patient on 2 L nasal cannula.    ROS   A 12 point review of system was done and was negative except as mentioned above      Objective      Vitals:   Temp:  [97.3 °F (36.3 °C)-98.3 °F (36.8 °C)] 98.3 °F (36.8 °C)  Heart Rate:  [] 102  Resp:  [18-30] 30  BP: ()/(59-63) 91/60    General Appearance: AOO x 4, cooperative, no distress, appropriate for age  Head:  Normocephalic, without obvious abnormality  Eyes:  PERRL, EOM's intact, conjunctivae and cornea clear  Nose:  Nares symmetrical, septum midline, mucosa pink  Throat:  Lips, tongue, and mucosa are moist, pink, and intact  Neck:  Supple; symmetrical, trachea midline, no adenopathy  Back:  Symmetrical, ROM normal, no CVA tenderness  Lungs: Coarse breath sounds bilaterally.  Positive wheezing  Heart: Regular rate & rhythm, S1 and S2 normal  Abdomen:  Soft, nontender, bowel sounds active all four quadrants  Musculoskeletal: Tone and strength strong and symmetrical, all extremities; no joint pain or edema         Skin/Hair/Nails:  Skin warm, dry and intact, no rashes or abnormal dyspigmentation            Result Review    Result Review:  I have personally reviewed the results from the time of this admission to 10/22/2023 09:44 EDT and agree with these findings:  [x]  Laboratory  []  Microbiology  [x]  Radiology  []  EKG/Telemetry   []  Cardiology/Vascular   []  Pathology  []  Old records  []  Other:            Assessment & Plan      Brief Patient Summary:   Romero Maciel is a 58 y.o. male with past medical history smoking 2 pack/day smoker for the  past 30 years who presented to [facility] on 10/20/2023 complaining of shortness of breath, cough, decreased appetite and abnormal chest xray. He additionally reports 20 lbs unintentional weight  loss over the past 6 weeks, and his voice being hoarse over the past couple days. He was seen yesterday by pcp who ordered a chest xray he had completed at [facility] with radiology report with him that looks like a right upper lobe mass versus infiltrate and was advised to come to the ED for further evaluation. He denies fever, or chest pain, nausea, vomiting, diarrhea, or urinary complaints.      In the ED, CT of chest interpreted by radiologist shows a very large right upper lobe mass which extends into the right hilum and mediastinum with gross mediastinal adenopathy. Findings are compatible with malignancy. Infiltrates at the periphery of the lesion are compatible with postobstructive infiltrates.       cefTRIAXone, 1,000 mg, Intravenous, Q24H  doxepin, 75 mg, Oral, Nightly  folic acid, 1 mg, Oral, Daily  guaiFENesin, 1,200 mg, Oral, BID  ipratropium-albuterol, 3 mL, Nebulization, Q4H - RT  mirtazapine, 45 mg, Oral, Nightly  nicotine, 1 patch, Transdermal, Q24H  senna-docusate sodium, 2 tablet, Oral, BID  sodium chloride, 10 mL, Intravenous, Q12H             Active Hospital Problems:  Active Hospital Problems    Diagnosis     **Lung mass      Plan:   Lung mass  CT report reviewed.    Pulmonary and oncology already consulted.   Status post bronchoscopy today, 10/22  CT abdomen negative for any acute findings  MRI head pending  Biopsy for further diagnosis and staging and management.    Pneumonia  Postobstructive pneumonia.    Continue Rocephin    COPD/tobacco abuse  Bronchodilator therapy.    Nicotine patch advised patient to quit.    Electrolyte imbalance- Resolved  Monitor electrolytes.   Follow-up BMP      DVT prophylaxis:  Mechanical DVT prophylaxis orders are present.    CODE STATUS:    Code  Status (Patient has no pulse and is not breathing): CPR (Attempt to Resuscitate)  Medical Interventions (Patient has pulse or is breathing): Full Support      Disposition:  I expect patient to be discharged 2 to 3 days          Electronically signed by Olamide Dugan MD, 10/22/23, 09:44 EDT.  [facility] Hospitalist Team

## 2023-10-22 NOTE — PLAN OF CARE
Goal Outcome Evaluation:  Patient is resting comfortably after the bronch.  Patient has no complaints.  Will monitor.

## 2023-10-22 NOTE — PROGRESS NOTES
Daily Progress Note          Assessment    Lung mass: Right upper lobe infiltrate in right hilum with enlarged mediastinal lymphadenopathy compatible with bronchogenic malignancy  Postobstructive pneumonia  Anemia  Tobacco smoking  History of anxiety and depression        Recommendations:  Schedule bronchoscopy 10/22/2023    Oxygen supplement and titration to maintain saturation 90 to 95%  Bronchodilators  Antibiotics: Upon discharge patient can be switched to Ceftin for 5 days  Mucinex  Smoking cessation counseling, nicotine patch  Oncology consulted, CT scan of the abdomen pelvis shows no metastasis.                      LOS: 0 days     Subjective     Patient reports mild cough, mild shortness of breath on exertion.    Objective     Vital signs for last 24 hours:  Vitals:    10/22/23 0357 10/22/23 0715 10/22/23 0719 10/22/23 0738   BP: 94/62   91/60   BP Location: Left arm   Left arm   Patient Position: Lying   Lying   Pulse: 107 85 97 102   Resp: (!) 29 22 18 (!) 30   Temp:    98.3 °F (36.8 °C)   TempSrc:    Oral   SpO2: 94% 98% 98% 96%   Weight:       Height:           Intake/Output last 3 shifts:  I/O last 3 completed shifts:  In: -   Out: 550 [Urine:550]  Intake/Output this shift:  I/O this shift:  In: 500 [I.V.:500]  Out: -       Radiology  Imaging Results (Last 24 Hours)       Procedure Component Value Units Date/Time    CT Abdomen Pelvis With Contrast [[id number]] Collected: 10/21/23 1732     Updated: 10/21/23 1737    Narrative:      CT ABDOMEN PELVIS W CONTRAST    Date of Exam: 10/21/2023 4:00 PM CDT    Indication: Rule out additional masses.    Comparison: CT chest 10/21/2023    Technique: Axial CT images were obtained of the abdomen and pelvis following the uneventful intravenous administration of 100 cc of Isovue-370. Sagittal and coronal reconstructions were performed.  Automated exposure control and iterative reconstruction   methods were used.        Findings:  LUNG BASES: Minimal right basal  atelectasis with trace right effusion. There is a thin pericardial effusion measuring up to 7 mm anteriorly. Findings are similar to prior exam.    LIVER: Few cysts and other hypodensities that are too small to characterize. No suspicious hepatic lesion identified.  BILIARY/GALLBLADDER: Cholecystectomy  SPLEEN:  Unremarkable  PANCREAS:  Unremarkable  ADRENAL:  Unremarkable  KIDNEYS:  Unremarkable parenchyma with no solid mass identified. No obstruction.  No calculus identified.  GASTROINTESTINAL/MESENTERY:  No evidence of obstruction nor inflammation.    MESENTERIC VESSELS:  Patent.  AORTA/IVC:  Normal caliber.    RETROPERITONEUM/LYMPH NODES:  Unremarkable    REPRODUCTIVE:  Unremarkable  BLADDER:  Unremarkable    OSSEUS STRUCTURES:  Typical for age with no acute process identified.        Impression:      Impression:  1.No acute process nor metastatic disease identified within the abdomen nor pelvis.            Electronically Signed: Corky Abrams MD    10/21/2023 4:35 PM CDT    Workstation ID: [id number]            Labs:  Results from last 7 days   Lab Units 10/22/23  0402   WBC 10*3/mm3 5.30   HEMOGLOBIN g/dL 10.1*   HEMATOCRIT % 29.5*   PLATELETS 10*3/mm3 299     Results from last 7 days   Lab Units 10/22/23  0402 10/21/23  1212 10/20/23  1459   SODIUM mmol/L 136   < > 132*   POTASSIUM mmol/L 4.7   < > 3.4*   CHLORIDE mmol/L 104   < > 93*   CO2 mmol/L 22.0   < > 24.0   BUN mg/dL 4*   < > 8   CREATININE mg/dL 0.86   < > 1.13   CALCIUM mg/dL 8.5*   < > 9.6   BILIRUBIN mg/dL  --   --  0.4   ALK PHOS U/L  --   --  158*   ALT (SGPT) U/L  --   --  25   AST (SGOT) U/L  --   --  49*   GLUCOSE mg/dL 94   < > 108*    < > = values in this interval not displayed.         Results from last 7 days   Lab Units 10/20/23  1459   ALBUMIN g/dL 3.6                               Meds:   SCHEDULE  cefTRIAXone, 1,000 mg, Intravenous, Q24H  doxepin, 75 mg, Oral, Nightly  folic acid, 1 mg, Oral, Daily  guaiFENesin, 1,200 mg, Oral,  BID  ipratropium-albuterol, 3 mL, Nebulization, Q4H - RT  mirtazapine, 45 mg, Oral, Nightly  nicotine, 1 patch, Transdermal, Q24H  senna-docusate sodium, 2 tablet, Oral, BID  sodium chloride, 10 mL, Intravenous, Q12H      Infusions     PRNs    acetaminophen **OR** acetaminophen **OR** acetaminophen    aluminum-magnesium hydroxide-simethicone    senna-docusate sodium **AND** polyethylene glycol **AND** bisacodyl **AND** bisacodyl    Calcium Replacement - Follow Nurse / BPA Driven Protocol    clonazePAM    lidocaine    lidocaine    Magnesium Standard Dose Replacement - Follow Nurse / BPA Driven Protocol    melatonin    ondansetron **OR** ondansetron    Phosphorus Replacement - Follow Nurse / BPA Driven Protocol    Potassium Replacement - Follow Nurse / BPA Driven Protocol    [COMPLETED] Insert Peripheral IV **AND** sodium chloride    sodium chloride    sodium chloride    Physical Exam:  General Appearance:  Alert   HEENT:  Normocephalic, without obvious abnormality, Conjunctiva/corneas clear,.   Nares normal, no drainage     Neck:  Supple, symmetrical, trachea midline.   Lungs /Chest wall:   Mild right-sided rhonchi, respirations unlabored, symmetrical wall movement.     Heart:  Regular rate and rhythm, S1 S2 normal  Abdomen: Soft, non-tender, no masses, no organomegaly.    Extremities: No edema, no clubbing or cyanosis     ROS  Constitutional: Negative for chills, fever and malaise/fatigue.   HENT: Negative.    Eyes: Negative.    Cardiovascular: Negative.    Respiratory: Positive for mild cough and shortness of breath.    Skin: Negative.    Musculoskeletal: Negative.    Gastrointestinal: Negative.    Genitourinary: Negative.    Neurological: Negative.    Psychiatric/Behavioral: Negative.      I reviewed the recent clinical results    Part of this note may be an electronic transcription/translation of spoken language to printed text using the Dragon Dictation System.

## 2023-10-22 NOTE — PLAN OF CARE
Goal Outcome Evaluation:  Plan of Care Reviewed With: patient        Progress: no change  Outcome Evaluation: Patient NPO for AM bronch. Patient waiting to talk to doctor before signing consent. Patient asking for PRN anxiety medication as needed. PAtient in bed with call light in reach

## 2023-10-22 NOTE — PROGRESS NOTES
Hematology/Oncology Inpatient Progress Note    PATIENT NAME: Romero Maciel  : 1965  MRN: [id number]    Chief complaint:      History of present illness:    Romero Maciel is a 58 y.o. male who presented to [facility] on 10/20/2023 with complaints of cough, abnormal chest xray.     Patient is a 58-year-old male with history of 60 pack year smoking who has been complaining of shortness of breath, cough weight loss of over 20 pounds over the last few months.  He also started to have hoarseness of voice.  He was seen by PCP and a chest x-ray was ordered which was abnormal patient was advised to come to the ER     10/20/2023 CT chest with very large right upper lobe mass which extends into the right hilum and mediastinum with gross mediastinal adenopathy findings compatible with malignancy.  Postobstructive infiltrates.  Minimal right pleural effusion     10/21/23  Hematology/Oncology was consulted      He/She  has no past medical history on file.    Subjective   No new symptoms overnight.        MEDICATIONS:    Scheduled Meds:  cefTRIAXone, 1,000 mg, Intravenous, Q24H  doxepin, 75 mg, Oral, Nightly  guaiFENesin, 1,200 mg, Oral, BID  ipratropium-albuterol, 3 mL, Nebulization, Q4H - RT  mirtazapine, 45 mg, Oral, Nightly  nicotine, 1 patch, Transdermal, Q24H  senna-docusate sodium, 2 tablet, Oral, BID  sodium chloride, 10 mL, Intravenous, Q12H       Continuous Infusions:      PRN Meds:    acetaminophen **OR** acetaminophen **OR** acetaminophen    aluminum-magnesium hydroxide-simethicone    senna-docusate sodium **AND** polyethylene glycol **AND** bisacodyl **AND** bisacodyl    Calcium Replacement - Follow Nurse / BPA Driven Protocol    clonazePAM    lidocaine    lidocaine    Magnesium Standard Dose Replacement - Follow Nurse / BPA Driven Protocol    melatonin    ondansetron **OR** ondansetron    Phosphorus Replacement - Follow Nurse / BPA Driven Protocol    Potassium Replacement - Follow Nurse / BPA  "Driven Protocol    [COMPLETED] Insert Peripheral IV **AND** sodium chloride    sodium chloride    sodium chloride     ALLERGIES:    Allergies   Allergen Reactions    Penicillins Anaphylaxis       Objective    VITALS:   BP 91/60 (BP Location: Left arm, Patient Position: Lying)   Pulse 102   Temp 98.3 °F (36.8 °C) (Oral)   Resp (!) 30   Ht 175.3 cm (69\")   Wt 65.7 kg (144 lb 13.5 oz)   SpO2 96%   BMI 21.39 kg/m²     PHYSICAL EXAM: (performed by MD)  Physical Exam  Constitutional:       Appearance: Normal appearance.   HENT:      Head: Normocephalic and atraumatic.   Eyes:      Pupils: Pupils are equal, round, and reactive to light.   Cardiovascular:      Rate and Rhythm: Normal rate and regular rhythm.      Pulses: Normal pulses.      Heart sounds: No murmur heard.  Pulmonary:      Effort: Pulmonary effort is normal.      Breath sounds: Normal breath sounds.   Abdominal:      General: There is no distension.      Palpations: Abdomen is soft. There is no mass.      Tenderness: There is no abdominal tenderness.   Musculoskeletal:         General: Normal range of motion.      Cervical back: Normal range of motion and neck supple.   Skin:     General: Skin is warm.   Neurological:      General: No focal deficit present.      Mental Status: He is alert.   Psychiatric:         Mood and Affect: Mood normal.           RECENT LABS:  Lab Results (last 24 hours)       Procedure Component Value Units Date/Time    Basic Metabolic Panel [[id number]]  (Abnormal) Collected: 10/22/23 0402    Specimen: Blood Updated: 10/22/23 0528     Glucose 94 mg/dL      BUN 4 mg/dL      Creatinine 0.86 mg/dL      Sodium 136 mmol/L      Potassium 4.7 mmol/L      Comment: Result checked          Chloride 104 mmol/L      CO2 22.0 mmol/L      Calcium 8.5 mg/dL      BUN/Creatinine Ratio 4.7     Anion Gap 10.0 mmol/L      eGFR 99.7 mL/min/1.73     Narrative:      GFR Normal >60  Chronic Kidney Disease <60  Kidney Failure <15      CBC & Differential " [[id number]]  (Abnormal) Collected: 10/22/23 0402    Specimen: Blood Updated: 10/22/23 0456    Narrative:      The following orders were created for panel order CBC & Differential.  Procedure                               Abnormality         Status                     ---------                               -----------         ------                     CBC Auto Differential[[id number]]        Abnormal            Final result                 Please view results for these tests on the individual orders.    CBC Auto Differential [[id number]]  (Abnormal) Collected: 10/22/23 0402    Specimen: Blood Updated: 10/22/23 0456     WBC 5.30 10*3/mm3      RBC 3.50 10*6/mm3      Hemoglobin 10.1 g/dL      Hematocrit 29.5 %      MCV 84.3 fL      MCH 28.7 pg      MCHC 34.1 g/dL      RDW 15.0 %      RDW-SD 47.3 fl      MPV 7.4 fL      Platelets 299 10*3/mm3      Neutrophil % 64.5 %      Lymphocyte % 26.2 %      Monocyte % 5.3 %      Eosinophil % 3.2 %      Basophil % 0.8 %      Neutrophils, Absolute 3.40 10*3/mm3      Lymphocytes, Absolute 1.40 10*3/mm3      Monocytes, Absolute 0.30 10*3/mm3      Eosinophils, Absolute 0.20 10*3/mm3      Basophils, Absolute 0.00 10*3/mm3      nRBC 0.1 /100 WBC     Vitamin B12 [[id number]]  (Normal) Collected: 10/21/23 1212    Specimen: Blood Updated: 10/21/23 1806     Vitamin B-12 276 pg/mL     Narrative:      Results may be falsely increased if patient taking Biotin.      Folate [[id number]]  (Abnormal) Collected: 10/21/23 1212    Specimen: Blood Updated: 10/21/23 1806     Folate <2.00 ng/mL     Narrative:      Results may be falsely increased if patient taking Biotin.      Blood Culture - Blood, Arm, Right [[id number]]  (Normal) Collected: 10/20/23 1536    Specimen: Blood from Arm, Right Updated: 10/21/23 1546     Blood Culture No growth at 24 hours    Blood Culture - Blood, Arm, Left [[id number]]  (Normal) Collected: 10/20/23 5699    Specimen: Blood from Arm, Left Updated: 10/21/23 1516     Blood Culture  No growth at 24 hours    Basic Metabolic Panel [[id number]]  (Abnormal) Collected: 10/21/23 1212    Specimen: Blood Updated: 10/21/23 1245     Glucose 89 mg/dL      BUN 6 mg/dL      Creatinine 0.95 mg/dL      Sodium 135 mmol/L      Potassium 3.5 mmol/L      Chloride 100 mmol/L      CO2 23.0 mmol/L      Calcium 8.7 mg/dL      BUN/Creatinine Ratio 6.3     Anion Gap 12.0 mmol/L      eGFR 92.2 mL/min/1.73     Narrative:      GFR Normal >60  Chronic Kidney Disease <60  Kidney Failure <15      CBC Auto Differential [[id number]]  (Abnormal) Collected: 10/21/23 1212    Specimen: Blood Updated: 10/21/23 1222     WBC 5.30 10*3/mm3      RBC 3.44 10*6/mm3      Hemoglobin 9.7 g/dL      Hematocrit 29.1 %      MCV 84.5 fL      MCH 28.1 pg      MCHC 33.3 g/dL      RDW 14.9 %      RDW-SD 45.9 fl      MPV 7.2 fL      Platelets 267 10*3/mm3      Neutrophil % 74.3 %      Lymphocyte % 18.8 %      Monocyte % 4.2 %      Eosinophil % 1.8 %      Basophil % 0.9 %      Neutrophils, Absolute 3.90 10*3/mm3      Lymphocytes, Absolute 1.00 10*3/mm3      Monocytes, Absolute 0.20 10*3/mm3      Eosinophils, Absolute 0.10 10*3/mm3      Basophils, Absolute 0.00 10*3/mm3      nRBC 0.1 /100 WBC             IMAGING REVIEWED:  CT Abdomen Pelvis With Contrast    Result Date: 10/21/2023  Impression: 1.No acute process nor metastatic disease identified within the abdomen nor pelvis. Electronically Signed: Corky Abrams MD  10/21/2023 4:35 PM CDT  Workstation ID: [id number]    CT Chest With Contrast Diagnostic    Result Date: 10/20/2023  Impression: Very large right upper lobe mass which extends into the right hilum and mediastinum with gross mediastinal adenopathy. Findings are compatible with malignancy. Infiltrates at the periphery of the lesion are compatible with postobstructive infiltrates. Minimal right pleural effusion. Electronically Signed: Anne-Marie Koroma MD  10/20/2023 4:10 PM EDT  Workstation ID: [id number]     Assessment & Plan     Patient is a  58-year-old male with chronic smoking history who presents with cough, shortness of breath CT imaging concerning for a large right upper lobe mass mediastinal adenopathy     Lung mass  Large right upper lobe lung mass with infiltrate in the right hilum  Significantly enlarged mediastinal adenopathy likely lung cancer with metastasis to the lymph node  Pulmonary has been consulted plan for bronchoscopy  We will send biopsy sample for NGS, PD-L1 testing if non-small cell lung cancer  Await bronchoscopy and biopsy results plan for bronch today   Complete CT abdomen pelvis no acute process, will get MRI brain as well once confirmed.      Anemia  Normocytic anemia  Iron level is low however ferritin is high which could be an acute phase reactant  Monitor CBC obtain complete iron profile including iron saturation B12 and folate levels  Low folate, start replacement.

## 2023-10-22 NOTE — ANESTHESIA PREPROCEDURE EVALUATION
Anesthesia Evaluation     Patient summary reviewed and Nursing notes reviewed   NPO Solid Status: > 8 hours  NPO Liquid Status: > 8 hours           Airway   Mallampati: II  TM distance: >3 FB  Neck ROM: full  No difficulty expected and Narrow palate  Dental    (+) edentulous    Pulmonary    (+) a smoker Current Abstained day of surgery, lung cancer, COPD moderate,decreased breath sounds  Cardiovascular   Exercise tolerance: unable to assess    ECG reviewed  Rhythm: regular  Rate: normal        Neuro/Psych  (+) weakness  GI/Hepatic/Renal/Endo      Musculoskeletal     Abdominal  - normal exam   Substance History      OB/GYN          Other      history of cancer active                Anesthesia Plan    ASA 4 - emergent     general and MAC     intravenous induction     Anesthetic plan, risks, benefits, and alternatives have been provided, discussed and informed consent has been obtained with: patient.    CODE STATUS:    Code Status (Patient has no pulse and is not breathing): CPR (Attempt to Resuscitate)  Medical Interventions (Patient has pulse or is breathing): Full Support

## 2023-10-23 ENCOUNTER — NURSE NAVIGATOR (OUTPATIENT)
Dept: ONCOLOGY | Facility: CLINIC | Age: 58
End: 2023-10-23
Payer: COMMERCIAL

## 2023-10-23 ENCOUNTER — TELEPHONE (OUTPATIENT)
Dept: ONCOLOGY | Facility: CLINIC | Age: 58
End: 2023-10-23

## 2023-10-23 VITALS
TEMPERATURE: 97.8 F | WEIGHT: 144.84 LBS | BODY MASS INDEX: 21.45 KG/M2 | DIASTOLIC BLOOD PRESSURE: 73 MMHG | SYSTOLIC BLOOD PRESSURE: 107 MMHG | OXYGEN SATURATION: 96 % | HEIGHT: 69 IN | RESPIRATION RATE: 17 BRPM | HEART RATE: 98 BPM

## 2023-10-23 PROCEDURE — 99232 SBSQ HOSP IP/OBS MODERATE 35: CPT | Performed by: INTERNAL MEDICINE

## 2023-10-23 PROCEDURE — 94799 UNLISTED PULMONARY SVC/PX: CPT

## 2023-10-23 PROCEDURE — 94664 DEMO&/EVAL PT USE INHALER: CPT

## 2023-10-23 PROCEDURE — 63710000001 DEXAMETHASONE PER 0.25 MG: Performed by: INTERNAL MEDICINE

## 2023-10-23 RX ORDER — BUDESONIDE 0.5 MG/2ML
0.5 INHALANT ORAL
Qty: 120 ML | Refills: 0 | Status: SHIPPED | OUTPATIENT
Start: 2023-10-23

## 2023-10-23 RX ORDER — IPRATROPIUM BROMIDE AND ALBUTEROL SULFATE 2.5; .5 MG/3ML; MG/3ML
3 SOLUTION RESPIRATORY (INHALATION) EVERY 4 HOURS PRN
Qty: 180 ML | Refills: 0 | Status: SHIPPED | OUTPATIENT
Start: 2023-10-23

## 2023-10-23 RX ORDER — BUDESONIDE 0.5 MG/2ML
0.5 INHALANT ORAL
Status: DISCONTINUED | OUTPATIENT
Start: 2023-10-23 | End: 2023-10-23 | Stop reason: HOSPADM

## 2023-10-23 RX ORDER — DEXAMETHASONE 4 MG/1
4 TABLET ORAL EVERY 12 HOURS SCHEDULED
Status: DISCONTINUED | OUTPATIENT
Start: 2023-10-23 | End: 2023-10-23 | Stop reason: HOSPADM

## 2023-10-23 RX ORDER — NICOTINE 21 MG/24HR
1 PATCH, TRANSDERMAL 24 HOURS TRANSDERMAL EVERY 24 HOURS
Qty: 28 EACH | Refills: 0 | Status: SHIPPED | OUTPATIENT
Start: 2023-10-23

## 2023-10-23 RX ORDER — DEXAMETHASONE 6 MG/1
6 TABLET ORAL
Qty: 7 TABLET | Refills: 0 | Status: SHIPPED | OUTPATIENT
Start: 2023-10-23

## 2023-10-23 RX ADMIN — BUDESONIDE 0.5 MG: 0.5 INHALANT RESPIRATORY (INHALATION) at 10:52

## 2023-10-23 RX ADMIN — IPRATROPIUM BROMIDE AND ALBUTEROL SULFATE 3 ML: .5; 3 SOLUTION RESPIRATORY (INHALATION) at 10:52

## 2023-10-23 RX ADMIN — GUAIFENESIN 1200 MG: 600 TABLET, EXTENDED RELEASE ORAL at 07:59

## 2023-10-23 RX ADMIN — IPRATROPIUM BROMIDE AND ALBUTEROL SULFATE 3 ML: .5; 3 SOLUTION RESPIRATORY (INHALATION) at 08:09

## 2023-10-23 RX ADMIN — DEXAMETHASONE 4 MG: 4 TABLET ORAL at 13:04

## 2023-10-23 RX ADMIN — FOLIC ACID 1 MG: 1 TABLET ORAL at 08:00

## 2023-10-23 RX ADMIN — CLONAZEPAM 1 MG: 1 TABLET ORAL at 08:03

## 2023-10-23 RX ADMIN — Medication 10 ML: at 08:00

## 2023-10-23 NOTE — PROGRESS NOTES
Called and left a VM to call me back. [phone]    Patient returned my call. Patient given appt, dates, times and locations for Dr. Goodrich and PET scan (instructions also given)

## 2023-10-23 NOTE — PROGRESS NOTES
Hematology/Oncology Inpatient Progress Note    PATIENT NAME: Romero Maciel  : 1965  MRN: [id number]    Chief complaint:      History of present illness:    Romero Maciel is a 58 y.o. male who presented to [facility] on 10/20/2023 with complaints of cough, abnormal chest xray.     Patient is a 58-year-old male with history of 60 pack year smoking who has been complaining of shortness of breath, cough weight loss of over 20 pounds over the last few months.  He also started to have hoarseness of voice.  He was seen by PCP and a chest x-ray was ordered which was abnormal patient was advised to come to the ER     10/20/2023 CT chest with very large right upper lobe mass which extends into the right hilum and mediastinum with gross mediastinal adenopathy findings compatible with malignancy.  Postobstructive infiltrates.  Minimal right pleural effusion       10/21/23  Hematology/Oncology was consulted      He/She  has no past medical history on file.    Subjective   No new symptoms overnight        MEDICATIONS:    Scheduled Meds:  budesonide, 0.5 mg, Nebulization, BID - RT  cefTRIAXone, 1,000 mg, Intravenous, Q24H  dexAMETHasone, 4 mg, Oral, Q12H  doxepin, 75 mg, Oral, Nightly  folic acid, 1 mg, Oral, Daily  guaiFENesin, 1,200 mg, Oral, BID  ipratropium-albuterol, 3 mL, Nebulization, Q4H - RT  mirtazapine, 45 mg, Oral, Nightly  nicotine, 1 patch, Transdermal, Q24H  senna-docusate sodium, 2 tablet, Oral, BID  sodium chloride, 10 mL, Intravenous, Q12H       Continuous Infusions:      PRN Meds:    acetaminophen **OR** acetaminophen **OR** acetaminophen    aluminum-magnesium hydroxide-simethicone    senna-docusate sodium **AND** polyethylene glycol **AND** bisacodyl **AND** bisacodyl    Calcium Replacement - Follow Nurse / BPA Driven Protocol    clonazePAM    Magnesium Standard Dose Replacement - Follow Nurse / BPA Driven Protocol    melatonin    ondansetron **OR** ondansetron    Phosphorus Replacement  "- Follow Nurse / BPA Driven Protocol    Potassium Replacement - Follow Nurse / BPA Driven Protocol    [COMPLETED] Insert Peripheral IV **AND** sodium chloride    sodium chloride    sodium chloride     ALLERGIES:    Allergies   Allergen Reactions    Penicillins Anaphylaxis       Objective    VITALS:   /75 (BP Location: Right arm, Patient Position: Sitting)   Pulse 93   Temp 97.4 °F (36.3 °C) (Oral)   Resp 20   Ht 175.3 cm (69\")   Wt 65.7 kg (144 lb 13.5 oz)   SpO2 96%   BMI 21.39 kg/m²     PHYSICAL EXAM: (performed by MD)  Physical Exam  Constitutional:       Appearance: Normal appearance.   HENT:      Head: Normocephalic and atraumatic.   Eyes:      Pupils: Pupils are equal, round, and reactive to light.   Cardiovascular:      Rate and Rhythm: Normal rate and regular rhythm.      Pulses: Normal pulses.      Heart sounds: No murmur heard.  Pulmonary:      Effort: Pulmonary effort is normal.      Breath sounds: Normal breath sounds.   Abdominal:      General: There is no distension.      Palpations: Abdomen is soft. There is no mass.      Tenderness: There is no abdominal tenderness.   Musculoskeletal:         General: Normal range of motion.      Cervical back: Normal range of motion and neck supple.   Skin:     General: Skin is warm.   Neurological:      General: No focal deficit present.      Mental Status: He is alert.   Psychiatric:         Mood and Affect: Mood normal.           RECENT LABS:  Lab Results (last 24 hours)       Procedure Component Value Units Date/Time    Non-gynecologic Cytology [[id number]] Collected: 10/22/23 0931    Specimen: Lavage from Lung, Right Lower Lobe Updated: 10/23/23 1039    AFB Culture - Lavage, Lung, Right Lower Lobe [[id number]] Collected: 10/22/23 0931    Specimen: Lavage from Lung, Right Lower Lobe Updated: 10/23/23 0938     AFB Stain No acid fast bacilli seen on concentrated smear    Blood Culture - Blood, Arm, Right [[id number]]  (Normal) Collected: 10/20/23 1536    " Specimen: Blood from Arm, Right Updated: 10/22/23 1546     Blood Culture No growth at 2 days    Blood Culture - Blood, Arm, Left [[id number]]  (Normal) Collected: 10/20/23 1459    Specimen: Blood from Arm, Left Updated: 10/22/23 1515     Blood Culture No growth at 2 days    Respiratory Panel PCR w/COVID-19(SARS-CoV-2) [facility]/JOSE/[facility] In-House, NP Swab in UTM/VTM, 3-4 HR TAT - Lavage, Lung, Right Lower Lobe [[id number]]  (Normal) Collected: 10/22/23 0931    Specimen: Lavage from Lung, Right Lower Lobe Updated: 10/22/23 1148     ADENOVIRUS, PCR Not Detected     Coronavirus 229E Not Detected     Coronavirus HKU1 Not Detected     Coronavirus NL63 Not Detected     Coronavirus OC43 Not Detected     COVID19 Not Detected     Human Metapneumovirus Not Detected     Human Rhinovirus/Enterovirus Not Detected     Influenza A PCR Not Detected     Influenza B PCR Not Detected     Parainfluenza Virus 1 Not Detected     Parainfluenza Virus 2 Not Detected     Parainfluenza Virus 3 Not Detected     Parainfluenza Virus 4 Not Detected     RSV, PCR Not Detected     Bordetella pertussis pcr Not Detected     Bordetella parapertussis PCR Not Detected     Chlamydophila pneumoniae PCR Not Detected     Mycoplasma pneumo by PCR Not Detected    Narrative:      In the setting of a positive respiratory panel with a viral infection PLUS a negative procalcitonin without other underlying concern for bacterial infection, consider observing off antibiotics or discontinuation of antibiotics and continue supportive care. If the respiratory panel is positive for atypical bacterial infection (Bordetella pertussis, Chlamydophila pneumoniae, or Mycoplasma pneumoniae), consider antibiotic de-escalation to target atypical bacterial infection.            IMAGING REVIEWED:  MRI Brain With & Without Contrast    Result Date: 10/22/2023  1.1.7 cm peripherally enhancing mass within the right occipital lobe with mild surrounding edema, concerning for brain  metastasis given findings on recent chest CT. This lesion demonstrates some intrinsic T1 signal hyperintensity which may indicate the presence of blood products. 2.Additional findings compatible with chronic microvascular ischemic change. 3.Please see above for additional details. Electronically Signed: Sherwin Dela Cruz MD  10/22/2023 5:05 PM EDT  Workstation ID: [id number] [employer]    XR Chest 1 View    Result Date: 10/22/2023  Impression: There is dense right upper lobe airspace disease with thickening of the right paratracheal stripe. No pneumothorax is seen, status post bronchoscopy. Please correlate with findings on chest CT from 10/20/2023. Electronically Signed: Sherwin Dela Cruz MD  10/22/2023 10:46 AM EDT  Workstation ID: [id number]    CT Abdomen Pelvis With Contrast    Result Date: 10/21/2023  Impression: 1.No acute process nor metastatic disease identified within the abdomen nor pelvis. Electronically Signed: Corky Abrams MD  10/21/2023 4:35 PM CDT  Workstation ID: [id number]     Assessment & Plan     Patient is a 58-year-old male with chronic smoking history who presents with cough, shortness of breath CT imaging concerning for a large right upper lobe mass mediastinal adenopathy     Lung mass  Large right upper lobe lung mass with infiltrate in the right hilum  Significantly enlarged mediastinal adenopathy likely lung cancer with metastasis to the lymph node  Bronchoscopy with endobronchial mass biopsy positive for small cell lung cancer  MRI brain with solitary brain mets     Anemia  Normocytic anemia  Iron level is low however ferritin is high which could be an acute phase reactant  Monitor CBC obtain complete iron profile including iron saturation B12 and folate levels  Low folate, start replacement.     Patient is okay for discharge he will see Dr. Goodrich with radiation oncology and me outpatient discussed treatment options

## 2023-10-23 NOTE — DISCHARGE SUMMARY
[facility] Medicine Services  Discharge Summary    Date of Service: 10/23/23    Patient Name: Romero Maciel  : 1965  MRN: [id number]    Date of Admission: 10/20/2023  Discharge Diagnosis: Lung mass  Date of Discharge:  10/23/23    Primary Care Physician: Jennifer Maynard APRN      Presenting Problem:   Shortness of breath [R06.02]  Lung mass [R91.8]  Acute cough [R05.1]    Active and Resolved Hospital Problems:  Active Hospital Problems    Diagnosis POA    **Lung mass [R91.8] Yes      Resolved Hospital Problems   No resolved problems to display.         Hospital Course     Hospital Course:  Romero Maciel is a 58 y.o. male with past medical history smoking 2 pack/day smoker for the past 30 years who presented to [facility] on 10/20/2023 complaining of shortness of breath, cough, decreased appetite and abnormal chest xray. He additionally reports 20 lbs unintentional weight  loss over the past 6 weeks, and his voice being hoarse over the past couple days. Pcp who ordered a chest xray he had completed at [facility] with radiology report with him that looks like a right upper lobe mass versus infiltrate and was advised to come to the ED for further evaluation.      In the ED, CT of chest interpreted by radiologist shows a very large right upper lobe mass which extends into the right hilum and mediastinum with gross mediastinal adenopathy. Findings are compatible with malignancy. Infiltrates at the periphery of the lesion are compatible with postobstructive infiltrates.     Pulmonology and hematology/oncology were consulted.  Patient was started on Rocephin for postobstructive pneumonia and breathing treatments.  Pulmonology evaluated patient and he underwent bronchoscopy on 10/22.  CT abdomen negative for any acute findings.  MRI of the head showed 1.7 cm peripherally enhancing mass within the right occipital lobe with mild surrounding edema concerning for brain  metastasis.  Patient remained hemodynamically stable.  He was weaned off oxygen.  Was ordered for nebulizer machine upon discharge.   Discharged with Decadron as well. Patient to follow-up with pulmonology next week.      DISCHARGE Follow Up Recommendations for labs and diagnostics:       Reasons For Change In Medications and Indications for New Medications:      Day of Discharge     Vital Signs:  Temp:  [97.4 °F (36.3 °C)-98.2 °F (36.8 °C)] 97.8 °F (36.6 °C)  Heart Rate:  [] 98  Resp:  [16-30] 17  BP: ()/(67-75) 107/73  Flow (L/min):  [3] 3    Physical Exam:  Physical Exam   General Appearance: AOO x 4, cooperative, no distress, appropriate for age  Head:  Normocephalic, without obvious abnormality  Eyes:  PERRL, EOM's intact, conjunctivae and cornea clear  Nose:  Nares symmetrical, septum midline, mucosa pink  Throat:  Lips, tongue, and mucosa are moist, pink, and intact  Neck:  Supple; symmetrical, trachea midline, no adenopathy  Back:  Symmetrical, ROM normal, no CVA tenderness  Lungs: Respirations unlabored, no audible wheeze  Heart: Regular rate & rhythm, S1 and S2 normal  Abdomen:  Soft, nontender, bowel sounds active all four quadrants  Musculoskeletal: Tone and strength strong and symmetrical, all extremities; no joint pain or edema         Skin/Hair/Nails:  Skin warm, dry and intact, no rashes or abnormal dyspigmentation         Pertinent  and/or Most Recent Results     LAB RESULTS:      Lab 10/22/23  0402 10/21/23  1212 10/20/23  1638 10/20/23  1459   WBC 5.30 5.30  --  8.00   HEMOGLOBIN 10.1* 9.7*  --  11.4*   HEMATOCRIT 29.5* 29.1*  --  33.6*   PLATELETS 299 267  --  272   NEUTROS ABS 3.40 3.90  --  6.70   LYMPHS ABS 1.40 1.00  --  1.00   MONOS ABS 0.30 0.20  --  0.30   EOS ABS 0.20 0.10  --  0.10   MCV 84.3 84.5  --  84.5   PROCALCITONIN  --   --   --  0.27*   LACTATE  --   --  2.0  --          Lab 10/22/23  0402 10/21/23  1212 10/20/23  1459   SODIUM 136 135* 132*   POTASSIUM 4.7 3.5 3.4*    CHLORIDE 104 100 93*   CO2 22.0 23.0 24.0   ANION GAP 10.0 12.0 15.0   BUN 4* 6 8   CREATININE 0.86 0.95 1.13   EGFR 99.7 92.2 74.9   GLUCOSE 94 89 108*   CALCIUM 8.5* 8.7 9.6         Lab 10/20/23  1459   TOTAL PROTEIN 7.7   ALBUMIN 3.6   GLOBULIN 4.1   ALT (SGPT) 25   AST (SGOT) 49*   BILIRUBIN 0.4   ALK PHOS 158*                 Lab 10/22/23  0402 10/21/23  1212 10/20/23  1459   IRON 34*  --  37*   IRON SATURATION (TSAT) 20  --   --    TIBC 174*  --   --    TRANSFERRIN 117*  --   --    FERRITIN  --   --  546.30*   FOLATE  --  <2.00*  --    VITAMIN B 12  --  276  --          Brief Urine Lab Results       None          Microbiology Results (last 10 days)       Procedure Component Value - Date/Time    AFB Culture - Lavage, Lung, Right Lower Lobe [[id number]] Collected: 10/22/23 0931    Lab Status: Preliminary result Specimen: Lavage from Lung, Right Lower Lobe Updated: 10/23/23 0938     AFB Stain No acid fast bacilli seen on concentrated smear    Respiratory Panel PCR w/COVID-19(SARS-CoV-2) KEESHA/CHAZ/JOSE/PAD/COR/MAD/PEDRO In-House, NP Swab in UTM/VTM, 3-4 HR TAT - Lavage, Lung, Right Lower Lobe [[id number]]  (Normal) Collected: 10/22/23 0931    Lab Status: Final result Specimen: Lavage from Lung, Right Lower Lobe Updated: 10/22/23 1148     ADENOVIRUS, PCR Not Detected     Coronavirus 229E Not Detected     Coronavirus HKU1 Not Detected     Coronavirus NL63 Not Detected     Coronavirus OC43 Not Detected     COVID19 Not Detected     Human Metapneumovirus Not Detected     Human Rhinovirus/Enterovirus Not Detected     Influenza A PCR Not Detected     Influenza B PCR Not Detected     Parainfluenza Virus 1 Not Detected     Parainfluenza Virus 2 Not Detected     Parainfluenza Virus 3 Not Detected     Parainfluenza Virus 4 Not Detected     RSV, PCR Not Detected     Bordetella pertussis pcr Not Detected     Bordetella parapertussis PCR Not Detected     Chlamydophila pneumoniae PCR Not Detected     Mycoplasma pneumo by PCR Not  Detected    Narrative:      In the setting of a positive respiratory panel with a viral infection PLUS a negative procalcitonin without other underlying concern for bacterial infection, consider observing off antibiotics or discontinuation of antibiotics and continue supportive care. If the respiratory panel is positive for atypical bacterial infection (Bordetella pertussis, Chlamydophila pneumoniae, or Mycoplasma pneumoniae), consider antibiotic de-escalation to target atypical bacterial infection.    Respiratory Panel PCR w/COVID-19(SARS-CoV-2) KEESHA/CHAZ/JOSE/PAD/COR/MAD/PEDRO In-House, NP Swab in UTM/VTM, 3-4 HR TAT - Swab, Nasopharynx [[id number]]  (Normal) Collected: 10/20/23 1951    Lab Status: Final result Specimen: Swab from Nasopharynx Updated: 10/20/23 2057     ADENOVIRUS, PCR Not Detected     Coronavirus 229E Not Detected     Coronavirus HKU1 Not Detected     Coronavirus NL63 Not Detected     Coronavirus OC43 Not Detected     COVID19 Not Detected     Human Metapneumovirus Not Detected     Human Rhinovirus/Enterovirus Not Detected     Influenza A PCR Not Detected     Influenza B PCR Not Detected     Parainfluenza Virus 1 Not Detected     Parainfluenza Virus 2 Not Detected     Parainfluenza Virus 3 Not Detected     Parainfluenza Virus 4 Not Detected     RSV, PCR Not Detected     Bordetella pertussis pcr Not Detected     Bordetella parapertussis PCR Not Detected     Chlamydophila pneumoniae PCR Not Detected     Mycoplasma pneumo by PCR Not Detected    Narrative:      In the setting of a positive respiratory panel with a viral infection PLUS a negative procalcitonin without other underlying concern for bacterial infection, consider observing off antibiotics or discontinuation of antibiotics and continue supportive care. If the respiratory panel is positive for atypical bacterial infection (Bordetella pertussis, Chlamydophila pneumoniae, or Mycoplasma pneumoniae), consider antibiotic de-escalation to target atypical  bacterial infection.    Blood Culture - Blood, Arm, Right [[id number]]  (Normal) Collected: 10/20/23 1536    Lab Status: Preliminary result Specimen: Blood from Arm, Right Updated: 10/22/23 1546     Blood Culture No growth at 2 days    Blood Culture - Blood, Arm, Left [[id number]]  (Normal) Collected: 10/20/23 1459    Lab Status: Preliminary result Specimen: Blood from Arm, Left Updated: 10/22/23 1515     Blood Culture No growth at 2 days            MRI Brain With & Without Contrast    Result Date: 10/22/2023  Impression: 1.1.7 cm peripherally enhancing mass within the right occipital lobe with mild surrounding edema, concerning for brain metastasis given findings on recent chest CT. This lesion demonstrates some intrinsic T1 signal hyperintensity which may indicate the presence of blood products. 2.Additional findings compatible with chronic microvascular ischemic change. 3.Please see above for additional details. Electronically Signed: Sherwin Dela Cruz MD  10/22/2023 5:05 PM EDT  Workstation ID: [id number] Prohance    XR Chest 1 View    Result Date: 10/22/2023  Impression: Impression: There is dense right upper lobe airspace disease with thickening of the right paratracheal stripe. No pneumothorax is seen, status post bronchoscopy. Please correlate with findings on chest CT from 10/20/2023. Electronically Signed: Sherwin Dela Cruz MD  10/22/2023 10:46 AM EDT  Workstation ID: [id number]    CT Abdomen Pelvis With Contrast    Result Date: 10/21/2023  Impression: Impression: 1.No acute process nor metastatic disease identified within the abdomen nor pelvis. Electronically Signed: Corky Abrams MD  10/21/2023 4:35 PM CDT  Workstation ID: [id number]    CT Chest With Contrast Diagnostic    Result Date: 10/20/2023  Impression: Impression: Very large right upper lobe mass which extends into the right hilum and mediastinum with gross mediastinal adenopathy. Findings are compatible with malignancy. Infiltrates at the periphery of the  lesion are compatible with postobstructive infiltrates. Minimal right pleural effusion. Electronically Signed: Anne-Marie Koroma MD  10/20/2023 4:10 PM EDT  Workstation ID: [id number]                 Labs Pending at Discharge:  Pending Labs       Order Current Status    Fine Needle Aspiration In process    Fungus Culture - Lavage, Lung, Right Lower Lobe In process    Non-gynecologic Cytology In process    Pneumocystis PCR - Lavage, Lung, Right Lower Lobe In process    Tissue Pathology Exam In process    Virus Culture - Lavage, Lung, Right Lower Lobe In process    AFB Culture - Lavage, Lung, Right Lower Lobe Preliminary result    Blood Culture - Blood, Arm, Left Preliminary result    Blood Culture - Blood, Arm, Right Preliminary result            Procedures Performed  Procedure(s):  BRONCHOSCOPY WITH CRYO BIOPSY X1 AREA,  FINE NEEDLE ASPIRATION, AND BRONCHOALVEOLAR LAVAGE  10/22 0913 BRONCHOSCOPY      Consults:   Consults       Date and Time Order Name Status Description    10/20/2023  6:00 PM Hematology & Oncology Inpatient Consult Completed     10/20/2023  6:00 PM Inpatient Pulmonology Consult Completed     10/20/2023  5:01 PM Hospitalist (on-call MD unless specified)                Discharge Details        Discharge Medications        New Medications        Instructions Start Date   budesonide 0.5 MG/2ML nebulizer solution  Commonly known as: PULMICORT   0.5 mg, Nebulization, 2 Times Daily - RT      dexAMETHasone 6 MG tablet  Commonly known as: DECADRON   6 mg, Oral, Daily With Breakfast      ipratropium-albuterol 0.5-2.5 mg/3 ml nebulizer  Commonly known as: DUO-NEB   3 mL, Nebulization, Every 4 Hours PRN      nicotine 21 MG/24HR patch  Commonly known as: NICODERM CQ   1 patch, Transdermal, Every 24 Hours             Continue These Medications        Instructions Start Date   benzonatate 200 MG capsule  Commonly known as: TESSALON   200 mg, Oral, 2 Times Daily PRN      clonazePAM 1 MG tablet  Commonly known as:  KlonoPIN   1 mg, Oral, 2 Times Daily PRN      doxepin 75 MG capsule  Commonly known as: SINEquan   75 mg, Oral, Nightly      guaiFENesin 600 MG 12 hr tablet  Commonly known as: MUCINEX   1,200 mg, Oral, 2 Times Daily      mirtazapine 45 MG tablet  Commonly known as: REMERON   45 mg, Oral, Nightly             Stop These Medications      methylPREDNISolone 4 MG dose pack  Commonly known as: MEDROL              Allergies   Allergen Reactions    Penicillins Anaphylaxis         Discharge Disposition:   Home or Self Care    Diet:  Hospital:  Diet Order   Procedures    Diet: Regular/House Diet; Texture: Regular Texture (IDDSI 7); Fluid Consistency: Thin (IDDSI 0)         Discharge Activity:   As tolerated      CODE STATUS:  Code Status and Medical Interventions:   Ordered at: 10/20/23 1800     Code Status (Patient has no pulse and is not breathing):    CPR (Attempt to Resuscitate)     Medical Interventions (Patient has pulse or is breathing):    Full Support         No future appointments.        Time spent on Discharge including face to face service:  35 minutes     10/23/23      Signature: Electronically signed by Olamide Dugan MD, 10/23/23, 12:50 EDT.  [facility] Hospitalist Team

## 2023-10-23 NOTE — PROGRESS NOTES
"PULMONARY CRITICAL CARE PROGRESS  NOTE      PATIENT IDENTIFICATION:  Name: Romero Maciel  MRN: [id number]  :  1965     Age: 58 y.o.  Sex: male    DATE OF Note:  10/23/2023   Referring Physician: Olamide Dugan MD                  Subjective:   In bed resting, on O2, no SOB, no chest or abd pain, no bowel or bladder issues reported    Objective:  tMax 24 hrs: Temp (24hrs), Av.7 °F (36.5 °C), Min:97.4 °F (36.3 °C), Max:98.2 °F (36.8 °C)      Vitals Ranges:   Temp:  [97.4 °F (36.3 °C)-98.2 °F (36.8 °C)] 97.4 °F (36.3 °C)  Heart Rate:  [] 95  Resp:  [13-30] 22  BP: ()/(45-75) 108/75    Intake and Output Last 3 Shifts:   I/O last 3 completed shifts:  In: 740 [P.O.:240; I.V.:500]  Out: -     Exam:  /75 (BP Location: Right arm, Patient Position: Sitting)   Pulse 95   Temp 97.4 °F (36.3 °C) (Oral)   Resp 22   Ht 175.3 cm (69\")   Wt 65.7 kg (144 lb 13.5 oz)   SpO2 99%   BMI 21.39 kg/m²     General Appearance:   Alert  HEENT:  Normocephalic, without obvious abnormality. Conjunctivae/corneas clear.  Normal external ear canals. Nares normal, no drainage     Neck:  Supple, symmetrical, trachea midline. No JVD.  Lungs /Chest wall:   Bilateral basal rhonchi, respirations unlabored, symmetrical wall movement.     Heart:  Regular rate and rhythm, systolic murmur PMI left sternal border  Abdomen: Soft, nontender, no masses, no organomegaly.    Extremities: Trace edema, no clubbing or cyanosis        Medications:  cefTRIAXone, 1,000 mg, Intravenous, Q24H  dexAMETHasone, 4 mg, Oral, Q12H  doxepin, 75 mg, Oral, Nightly  folic acid, 1 mg, Oral, Daily  guaiFENesin, 1,200 mg, Oral, BID  ipratropium-albuterol, 3 mL, Nebulization, Q4H - RT  mirtazapine, 45 mg, Oral, Nightly  nicotine, 1 patch, Transdermal, Q24H  senna-docusate sodium, 2 tablet, Oral, BID  sodium chloride, 10 mL, Intravenous, Q12H        Infusion:        PRN:    acetaminophen **OR** acetaminophen **OR** acetaminophen    " aluminum-magnesium hydroxide-simethicone    senna-docusate sodium **AND** polyethylene glycol **AND** bisacodyl **AND** bisacodyl    Calcium Replacement - Follow Nurse / BPA Driven Protocol    clonazePAM    Magnesium Standard Dose Replacement - Follow Nurse / BPA Driven Protocol    melatonin    ondansetron **OR** ondansetron    Phosphorus Replacement - Follow Nurse / BPA Driven Protocol    Potassium Replacement - Follow Nurse / BPA Driven Protocol    [COMPLETED] Insert Peripheral IV **AND** sodium chloride    sodium chloride    sodium chloride  Data Review:  All labs (24hrs): No results found for this or any previous visit (from the past 24 hour(s)).     Imaging:  MRI Brain With & Without Contrast  Narrative: MRI BRAIN W WO CONTRAST    Date of Exam: 10/22/2023 3:35 PM EDT    Indication: Brain metastases suspected.     Comparison: None available.    Technique:  Routine multiplanar/multisequence sequence images of the brain were obtained before and after the uneventful administration of Prohance.    FINDINGS:  There is a 1.7 cm peripherally enhancing mass within the right occipital lobe with mild surrounding vasogenic edema. This is concerning for a neoplastic lesion, and there is some intrinsic T1 signal hyperintensity which may indicate the presence of blood   products. Multiple tiny foci of T2/FLAIR signal hyperintensity are seen within the bilateral hemispheric white matter. Midline structures appear unremarkable. No significant mass effect, intracranial hemorrhage, or hydrocephalus is identified. No   abnormal contrast enhancement is seen. Diffusion-weighted sequences demonstrate no acute infarct.The visualized intracranial flow-voids appear unremarkable. The paranasal sinuses and mastoid air cells show no fluid signal. The orbits, globes, retrobulbar   soft tissues appear unremarkable. The visualized superficial soft tissues and cervical spine demonstrate no significant abnormality.  Impression: 1.1.7 cm  peripherally enhancing mass within the right occipital lobe with mild surrounding edema, concerning for brain metastasis given findings on recent chest CT. This lesion demonstrates some intrinsic T1 signal hyperintensity which may indicate the   presence of blood products.  2.Additional findings compatible with chronic microvascular ischemic change.  3.Please see above for additional details.    Electronically Signed: Sherwin Dela Cruz MD    10/22/2023 5:05 PM EDT    Workstation ID: [id number]  Prohance  XR Chest 1 View  Narrative: XR CHEST 1 VW    Date of Exam: 10/22/2023 10:40 AM EDT    Indication: post bronchoscopy with biopsy    Comparison: None available.    Findings:  There is dense right upper lobe airspace disease with thickening of the right paratracheal stripe. No pneumothorax is seen, status post bronchoscopy. Left lung remains adequately aerated.    Cardiac silhouette is unremarkable. No acute osseous lesion is seen.  Impression: Impression:  There is dense right upper lobe airspace disease with thickening of the right paratracheal stripe. No pneumothorax is seen, status post bronchoscopy.     Please correlate with findings on chest CT from 10/20/2023.    Electronically Signed: Sherwin Dela Cruz MD    10/22/2023 10:46 AM EDT    Workstation ID: [id number]       ASSESSMENT:  Lung mass right upper lobe with mediastinal lymphadenopathy with brain mass likely mets  Postobstructive pneumonia  Anemia  Tobacco abuse  Anxiety/depression       PLAN:  Decadron ordered today per oncology  S/p bronchoscopy  Wean O2 to keep sats greater than 88%  Antibiotics  Bronchodilators  Inhaled corticosteroids  Incentive spirometer  Smoking cessation counseling  Nicotine patch  Oncology consulted  Electrolytes/ glycemic control  DVT and GI prophylaxis.    Discussed with Dr. Rose Catalan, DEIDRE  10/23/2023  09:41 EDT    I personally have examined  and interviewed the patient. I have reviewed the history, data, problems,  assessment and plan with our NP.  Total Critical care time in direct medical management (   ) minutes, This time specifically excludes time spent performing procedures.    Sloane Rose MD   10/23/2023  22:06 EDT

## 2023-10-23 NOTE — TELEPHONE ENCOUNTER
Caller: Debbie Maciel (NOT ON VERBAL)    Relationship: Emergency Contact    Best call back number: [phone]    What is the best time to reach you: ANY    Who are you requesting to speak with (clinical staff, provider,  specific staff member): CLINICAL/DR FONTAINE      What was the call regarding: PATIENT'S BROTHER DEBBIE WAS CALLING BACK FROM A MISSED CALL FROM THE OFFICE. DEBBIE HAD QUESTIONS ABOUT BROTHER'S TREATMENT.     Is it okay if the provider responds through MyChart: NO

## 2023-10-23 NOTE — DISCHARGE PLACEMENT REQUEST
"Romero Maciel (58 y.o. Male)       Date of Birth   1965    Social Security Number       Address   [address] [place] [facility]    Home Phone   [phone]    MRN   [id number]       Nondenominational   Other    Marital Status   Single                            Admission Date   10/20/23    Admission Type   Emergency    Admitting Provider   Olamide Dugan MD    Attending Provider   Olamide Dugan MD    Department, Room/Bed   [facility] 3A MEDICAL INPATIENT, 301/1       Discharge Date       Discharge Disposition   Home or Self Care    Discharge Destination                                 Attending Provider: Olamide Dugan MD    Allergies: Penicillins    Isolation: None   Infection: None   Code Status: CPR    Ht: 175.3 cm (69\")   Wt: 65.7 kg (144 lb 13.5 oz)    Admission Cmt: None   Principal Problem: Lung mass [R91.8]                   Active Insurance as of 10/20/2023       Primary Coverage       Payor Plan Insurance Group Employer/Plan Group    [facility] PPO [id number]       Payor Plan Address Payor Plan Phone Number Payor Plan Fax Number Effective Dates    [address] [phone]  1/10/2022 - None Entered    [facility] [id number]         Subscriber Name Subscriber Birth Date Member ID       ROMERO MACIEL 1965 [id number]                     Emergency Contacts        (Rel.) Home Phone Work Phone Mobile Phone    Suhas Maciel (Brother) -- -- [phone]                "

## 2023-10-23 NOTE — CASE MANAGEMENT/SOCIAL WORK
Case Management Discharge Note      Final Note: Home with brotherSuhas    Provided Post Acute Provider List?: N/A  Provided Post Acute Provider Quality & Resource List?: N/A    Selected Continued Care - Discharged on 10/23/2023 Admission date: 10/20/2023 - Discharge disposition: Home or Self Care   Final Discharge Disposition Code: 01 - home or self-care

## 2023-10-23 NOTE — CASE MANAGEMENT/SOCIAL WORK
Discharge Planning Assessment   John     Patient Name: Romero Maciel  MRN: [id number]  Today's Date: 10/23/2023    Admit Date: 10/20/2023    Plan: Home with family   Discharge Needs Assessment       Row Name 10/23/23 1144       Living Environment    People in Home sibling(s)    Name(s) of People in Home Brother Suhas    Current Living Arrangements home    In the past 12 months has the electric, gas, oil, or water company threatened to shut off services in your home? No    Primary Care Provided by self    Provides Primary Care For no one    Family Caregiver if Needed none    Quality of Family Relationships involved;helpful;supportive       Resource/Environmental Concerns    Resource/Environmental Concerns none    Transportation Concerns none       Food Insecurity    Within the past 12 months, you worried that your food would run out before you got the money to buy more. Never true    Within the past 12 months, the food you bought just didn't last and you didn't have money to get more. Never true       Transition Planning    Patient/Family Anticipates Transition to home with family    Patient/Family Anticipated Services at Transition none    Transportation Anticipated family or friend will provide       Discharge Needs Assessment    Readmission Within the Last 30 Days no previous admission in last 30 days    Equipment Currently Used at Home none    Concerns to be Addressed no discharge needs identified    Anticipated Changes Related to Illness none    Equipment Needed After Discharge none    Provided Post Acute Provider List? N/A    Provided Post Acute Provider Quality & Resource List? N/A    Current Discharge Risk chronically ill              Discharge Plan       Row Name 10/23/23 1146       Plan    Plan Home with family    Patient/Family in Agreement with Plan yes    Provided Post Acute Provider List? N/A    Provided Post Acute Provider Quality & Resource List? N/A    Plan Comments CM spoke with patient and  Suhas almendarez at bedside. CM reviewed PCP and pharmacy; pt wishes to enroll in [facility]. Pt is independent of all ADLs and does not use any DMEs. Patient requested home nebulizer, MD notified and will place order. Pt denies inability to afford medication or food.             Continued Care and Services - Admitted Since 10/20/2023    Coordination has not been started for this encounter.       Expected Discharge Date and Time       Expected Discharge Date Expected Discharge Time    Oct 24, 2023         Demographic Summary       Row Name 10/23/23 1144       General Information    Admission Type inpatient    Arrived From emergency department    Referral Source admission list    Reason for Consult care coordination/care conference    Preferred Language English       Contact Information    Permission Granted to Share Info With              Maritza Hines RN    phone [phone]  fax [phone]

## 2023-10-23 NOTE — PAYOR COMM NOTE
"This is discharge notification for Romero Maciel   Reference/Auth # [id number]   Pt discharged on 10/23/23    PENDING INPATIENT AUTHORIZATION     Danica Riley, RN, BSN  Utilization Review Nurse  [facility]  Direct & confidential phone # [phone]  Fax # [phone]      OBSERVATION TO INPATIENT CASE    10/22/23 1002  Inpatient Admission  Once     Completed     Level of Care: Telemetry  Diagnosis: Lung mass [013810]  Admitting Physician: OLAMIDE MONIQUE [[id number]]  Attending Physician: SONIDO PACHECO [[id number]]  Certification: I Certify That Inpatient Hospital Services Are Medically Necessary For Greater Than 2 Midnights    10/22/23 1001   10/20/23 1816  Initiate Observation Status  Once     Completed     Level of Care: Telemetry  Diagnosis: Lung mass [825000]  Admitting Physician: SONIDO PACHECO [[id number]]  Attending Physician: SONIDO PACHECO [[id number]]           Danica VIRK, RN    Care Coordination   [place] [address]    [phone] office  [phone] fax  [email or web address]  [facility]      Romero Maciel (58 y.o. Male)       Date of Birth   1965    Social Security Number       Address   [address] [place] [facility]    Home Phone   [phone]    MRN   [id number]       Restorationist   Other    Marital Status   Single                            Admission Date   10/20/23    Admission Type   Emergency    Admitting Provider   Olamide Monique MD    Attending Provider   Olamide Monique MD    Department, Room/Bed   [facility] 3A MEDICAL INPATIENT, 301/1       Discharge Date       Discharge Disposition       Discharge Destination                                 Attending Provider: Olamide Monique MD    Allergies: Penicillins    Isolation: None   Infection: None   Code Status: CPR    Ht: 175.3 cm (69\")   Wt: 65.7 kg (144 lb 13.5 oz)    Admission Cmt: None   Principal Problem: Lung " mass [R91.8]                   Active Insurance as of 10/20/2023       Primary Coverage       Payor Plan Insurance Group Employer/Plan Group    [employer] BLUE [employer]OSS BLUE [employer]IELD PPO [id number]       Payor Plan Address Payor Plan Phone Number Payor Plan Fax Number Effective Dates    [address] [phone]  1/10/2022 - None Entered    [facility] [id number]         Subscriber Name Subscriber Birth Date Member ID       JAKE MACIEL 1965 [id number]                     Emergency Contacts        (Rel.) Home Phone Work Phone Mobile Phone    Suhas Maciel (Brother) -- -- [phone]                 History & Physical        Bernice Espino APRN at 10/20/23 1800       Attestation signed by Jeanna David MD at 10/21/23 1033    I have reviewed this documentation and agree.                      [facility]  History & Physical    Patient Name: Jake Maciel  : 1965  MRN: [id number]  Primary Care Physician:  Jennifer Maynard APRN  Date of admission: 10/20/2023  Date and Time of Service: 10/20/2023     Subjective      Chief Complaint: cough, abnormal chest xray     History of Present Illness: Jake Maciel is a 58 y.o. male with past medical history of severe anxiety, 2 pack/day smoker for the past 30 years who presented to [facility]/2023 complaining of shortness of breath, cough, decreased appetite and abnormal chest xray. He additionally reports 20 lbs unintentional weight  loss over the past 6 weeks, and his voice being hoarse over the past couple days. He was seen yesterday by pcp who ordered a chest xray he had completed at [facility] with radiology report with him that looks like a right upper lobe mass versus infiltrate and was advised to come to the ED for further evaluation. He denies fever, or chest pain, nausea, vomiting, diarrhea, or urinary complaints.     In the ED, CT of chest interpreted by radiologist shows a very large  right upper lobe mass which extends into the right hilum and mediastinum with gross mediastinal adenopathy. Findings are compatible with malignancy. Infiltrates at the periphery of the lesion are compatible with postobstructive infiltrates. EKG shows sinus tachycardia, heart rate 103 bpm.  Vitals on admission, temp 98.2, heart rate 94, respirations 18, /66, SPO2 99%.  All labs unremarkable except sodium 132, potassium 3.4, alkaline phosphatase 158, procalcitonin 0.27, hemoglobin 11.4, hematocrit 33.6.  Blood cultures obtained, results pending. Patient received IV azithromycin, IV Rocephin, 20 mEq potassium chloride and a one 1 L normal saline bolus in ED. Hospitalist was contacted to admit patient for further care and management.         12 point ROS reviewed and negative except as mentioned above.       Personal History     History reviewed. No pertinent past medical history.    History reviewed. No pertinent surgical history.    Family History: family history is not on file. Otherwise pertinent FHx was reviewed and not pertinent to current issue.    Social History:      Home Medications:  Prior to Admission Medications       None              Allergies:  Allergies   Allergen Reactions    Penicillins Anaphylaxis       Objective      Vitals:   Temp:  [98.2 °F (36.8 °C)] 98.2 °F (36.8 °C)  Heart Rate:  [] 94  Resp:  [18] 18  BP: (104-129)/(66-74) 112/66    Physical Exam  Vitals reviewed.   Constitutional:       General: He is awake. He is not in acute distress.     Appearance: He is ill-appearing. He is not diaphoretic.   HENT:      Head: Normocephalic and atraumatic.      Mouth/Throat:      Mouth: Mucous membranes are dry.      Pharynx: Oropharynx is clear.   Eyes:      Extraocular Movements: Extraocular movements intact.      Pupils: Pupils are equal, round, and reactive to light.   Cardiovascular:      Rate and Rhythm: Normal rate and regular rhythm.      Pulses: Normal pulses.      Heart sounds:  Normal heart sounds.   Pulmonary:      Breath sounds: Rhonchi present.   Abdominal:      General: Abdomen is flat. Bowel sounds are normal.      Palpations: Abdomen is soft.   Musculoskeletal:         General: Normal range of motion.      Cervical back: Normal range of motion and neck supple.   Skin:     General: Skin is warm and dry.   Neurological:      General: No focal deficit present.      Mental Status: He is alert and oriented to person, place, and time.   Psychiatric:         Attention and Perception: Attention normal.         Mood and Affect: Mood is anxious. Affect is tearful.         Behavior: Behavior is cooperative.      Comments: Hoarse voice          Result Review   Result Review:  I have personally reviewed the results from the time of this admission to 10/20/2023 18:33 EDT and agree with these findings:  [x]  Laboratory  [x]  Microbiology  [x]  Radiology  []  EKG/Telemetry   []  Cardiology/Vascular   []  Pathology  []  Old records  []  Other:  Most notable findings include:   In the ED, CT of chest interpreted by radiologist shows a very large right upper lobe mass which extends into the right hilum and mediastinum with gross mediastinal adenopathy. Findings are compatible with malignancy. Infiltrates at the periphery of the lesion are compatible with postobstructive infiltrates. EKG shows sinus tachycardia, heart rate 103 bpm.  Vitals on admission, temp 98.2, heart rate 94, respirations 18, /66, SPO2 99%.  All labs unremarkable except sodium 132, potassium 3.4, alkaline phosphatase 158, procalcitonin 0.27, hemoglobin 11.4, hematocrit 33.6.  Blood cultures obtained, results pending. Patient received IV azithromycin, IV Rocephin, 20 mEq potassium chloride and a one 1 L normal saline bolus in ED. Hospitalist was contacted to admit patient for further care and management.     Assessment & Plan        Active Hospital Problems:  Active Hospital Problems    Diagnosis     **Lung mass      Plan:      Right upper lobe lung mass  - Pt presented with sob, cough, abnormal chest xray and 20 lbs unintentional weight loss  - 2 pack/day x30 year smoker  - CT chest showed, very large right upper lobe mass which extends into the right hilum and mediastinum with gross mediastinal adenopathy. Findings are compatible with malignancy  - outpatient CXR done at [facility]   - WBC 8.0  - lactate 2.0  - procal 0.27  - Patient given IV rocephin and IV azithromycin in ED x1 due to concern for pneumonia on initial presentation, will continue for now pending further evaluation by pulmonary and oncology  - Patient will likely need additional imaging, possibly CT Abdomen/pelvis, MRI of brain and PET scan and biopsy for further diagnosis and staging >>> will defer to oncology/pulmonology   - Duo-neb   - resume Mucinex   - Pulmonary consulted  - Oncology consulted    Hypokalemia  - potassium 3.4  - given 20 meq potassium in ED x1  - potassium replacement ordered    Hyponatremia  - Na 132  - 1 L normal saline given in ED x1  - recheck bmp in am     Anemia  - Hgb 11.4 / Hct 33.6  - check anemia studies   - recheck cbc in am     Anxiety/Depression  - patient reports taking Klonopin 1 mg twice daily, confirmed through dispense history  - will give one dose now as patient tearful and anxious after discussion of new diagnosis  - resume Klonopin, doxepin and remeron    Tobacco use  - encourage smoking cessation  - Nicotine patch ordered      DVT prophylaxis:  Mechanical DVT prophylaxis orders are present.    CODE STATUS:    Code Status (Patient has no pulse and is not breathing): CPR (Attempt to Resuscitate)  Medical Interventions (Patient has pulse or is breathing): Full Support    Admission Status:  I believe this patient meets observation status.    I discussed the patient's findings and my recommendations with patient, family, and attending  .      Signature: Electronically signed by DEIDRE Zavala, 10/20/23, 18:33  EDT.  [facility] Hospitalist Team      Electronically signed by Jeanna David MD at 10/21/23 1033          Emergency Department Notes        Rajesh Rincon PA at 10/20/23 1435       Attestation signed by Ty Em MD at 10/20/23 6981        SUPERVISE: For this patient encounter, I reviewed the APC's documentation, treatment plan, and medical decision making.  Ty Em MD 10/20/2023 21:32 EDT                         Subjective   Provider in Triage Note  Patient is a 30  yr pack smoker with SOA; no productive cough or fever.  States he had a 20 pound weight loss in the last couple months and no appetite.  Denies any vomiting diarrhea urinary complaints.  States he had an outpatient chest x-ray at [facility] with the radiology report with him that looks like a right upper lobe mass versus infiltrate and was sent to the ER by his PCP that he saw today.    Due to significant overcrowding in the emergency department patient was initially seen and evaluated in triage.  Provider in triage recommended patient placement in the treatment area to initiate therapy and movement to an ER bed as soon as possible.   Orders placed; medications will be deferred to main provider per protocol.        History of Present Illness    HPI reviewed and same as above.  Patient is a 58-year-old male who comes in complaining of shortness of breath and has sniffing and smoking history.    Review of Systems   Constitutional:  Positive for appetite change and unexpected weight change. Negative for chills, fatigue and fever.   HENT:  Negative for congestion, sore throat, tinnitus and trouble swallowing.    Eyes:  Negative for photophobia, discharge and visual disturbance.   Respiratory:  Positive for cough and shortness of breath. Negative for wheezing.    Cardiovascular:  Negative for chest pain, palpitations and leg swelling.   Gastrointestinal:  Negative for abdominal pain, blood in stool, diarrhea, nausea and vomiting.    Genitourinary:  Negative for dysuria, flank pain, frequency and urgency.   Musculoskeletal:  Negative for arthralgias and myalgias.   Skin:  Negative for rash.   Neurological:  Negative for dizziness, syncope and headaches.   Psychiatric/Behavioral:  Negative for confusion.        History reviewed. No pertinent past medical history.    Allergies   Allergen Reactions    Penicillins Anaphylaxis       History reviewed. No pertinent surgical history.    History reviewed. No pertinent family history.    Social History     Socioeconomic History    Marital status: Single           Objective   Physical Exam  Vitals and nursing note reviewed.   Constitutional:       General: He is not in acute distress.     Appearance: He is well-developed. He is not diaphoretic.   HENT:      Head: Normocephalic and atraumatic.      Nose: Nose normal.      Mouth/Throat:      Pharynx: No oropharyngeal exudate.   Eyes:      Extraocular Movements: Extraocular movements intact.      Conjunctiva/sclera: Conjunctivae normal.      Pupils: Pupils are equal, round, and reactive to light.   Cardiovascular:      Rate and Rhythm: Normal rate and regular rhythm.      Heart sounds: Normal heart sounds.      Comments: S1, S2 audible.  Pulmonary:      Effort: Pulmonary effort is normal. No respiratory distress.      Breath sounds: Normal breath sounds. No wheezing, rhonchi or rales.      Comments: On room air.  Abdominal:      General: Bowel sounds are normal. There is no distension.      Palpations: Abdomen is soft.      Tenderness: There is no abdominal tenderness.   Musculoskeletal:         General: No tenderness or deformity. Normal range of motion.      Cervical back: Normal range of motion.      Right lower leg: No edema.      Left lower leg: No edema.   Skin:     General: Skin is warm.      Capillary Refill: Capillary refill takes less than 2 seconds.      Findings: No erythema or rash.   Neurological:      Mental Status: He is alert and oriented  "to person, place, and time.      Cranial Nerves: No cranial nerve deficit.   Psychiatric:         Mood and Affect: Mood normal.         Behavior: Behavior normal.         Procedures          ED Course  ED Course as of 10/20/23 1841   Fri Oct 20, 2023   1545 Records faxed from [facility] for chest x-ray done on 10/19/2023 that showed a large area of consolidation overlies the right upper lung consistent with a marked bronchopneumonia. [RL]   1555 Awaiting imaging [RL]   1559 EKG independently interpreted by myself shows sinus tachycardia rate 103 beats a minute, no ST elevation apparent.  No previous to compare.  Findings confirmed by . [RL]      ED Course User Index  [RL] Rajesh Rincon PA      Labs Reviewed   COMPREHENSIVE METABOLIC PANEL - Abnormal; Notable for the following components:       Result Value    Glucose 108 (*)     Sodium 132 (*)     Potassium 3.4 (*)     Chloride 93 (*)     AST (SGOT) 49 (*)     Alkaline Phosphatase 158 (*)     All other components within normal limits    Narrative:     GFR Normal >60  Chronic Kidney Disease <60  Kidney Failure <15     PROCALCITONIN - Abnormal; Notable for the following components:    Procalcitonin 0.27 (*)     All other components within normal limits    Narrative:     As a Marker for Sepsis (Non-Neonates):    1. <0.5 ng/mL represents a low risk of severe sepsis and/or septic shock.  2. >2 ng/mL represents a high risk of severe sepsis and/or septic shock.    As a Marker for Lower Respiratory Tract Infections that require antibiotic therapy:    PCT on Admission    Antibiotic Therapy       6-12 Hrs later    >0.5                Strongly Recommended  >0.25 - <0.5        Recommended   0.1 - 0.25          Discouraged              Remeasure/reassess PCT  <0.1                Strongly Discouraged     Remeasure/reassess PCT    As 28 day mortality risk marker: \"Change in Procalcitonin Result\" (>80% or <=80%) if Day 0 (or Day 1) and Day 4 values are " available. Refer to http://www.[facility]-pct-calculator.com    Change in PCT <=80%  A decrease of PCT levels below or equal to 80% defines a positive change in PCT test result representing a higher risk for 28-day all-cause mortality of patients diagnosed with severe sepsis for septic shock.    Change in PCT >80%  A decrease of PCT levels of more than 80% defines a negative change in PCT result representing a lower risk for 28-day all-cause mortality of patients diagnosed with severe sepsis or septic shock.      CBC WITH AUTO DIFFERENTIAL - Abnormal; Notable for the following components:    RBC 3.97 (*)     Hemoglobin 11.4 (*)     Hematocrit 33.6 (*)     Neutrophil % 83.2 (*)     Lymphocyte % 11.9 (*)     Monocyte % 3.2 (*)     All other components within normal limits   POC LACTATE - Normal   BLOOD CULTURE   BLOOD CULTURE   RESPIRATORY PANEL PCR W/ COVID-19 (SARS-COV-2), NP SWAB IN UTM/VTP, 3-4 HR TAT   VITAMIN B12   FOLATE   FERRITIN   IRON   POC LACTATE   CBC AND DIFFERENTIAL    Narrative:     The following orders were created for panel order CBC & Differential.  Procedure                               Abnormality         Status                     ---------                               -----------         ------                     CBC Auto Differential[[id number]]        Abnormal            Final result                 Please view results for these tests on the individual orders.     Labs Reviewed   COMPREHENSIVE METABOLIC PANEL - Abnormal; Notable for the following components:       Result Value    Glucose 108 (*)     Sodium 132 (*)     Potassium 3.4 (*)     Chloride 93 (*)     AST (SGOT) 49 (*)     Alkaline Phosphatase 158 (*)     All other components within normal limits    Narrative:     GFR Normal >60  Chronic Kidney Disease <60  Kidney Failure <15     PROCALCITONIN - Abnormal; Notable for the following components:    Procalcitonin 0.27 (*)     All other components within normal limits    Narrative:     As a  "Marker for Sepsis (Non-Neonates):    1. <0.5 ng/mL represents a low risk of severe sepsis and/or septic shock.  2. >2 ng/mL represents a high risk of severe sepsis and/or septic shock.    As a Marker for Lower Respiratory Tract Infections that require antibiotic therapy:    PCT on Admission    Antibiotic Therapy       6-12 Hrs later    >0.5                Strongly Recommended  >0.25 - <0.5        Recommended   0.1 - 0.25          Discouraged              Remeasure/reassess PCT  <0.1                Strongly Discouraged     Remeasure/reassess PCT    As 28 day mortality risk marker: \"Change in Procalcitonin Result\" (>80% or <=80%) if Day 0 (or Day 1) and Day 4 values are available. Refer to http://www.[facility]ushing[facility] Systemspct-calculator.com    Change in PCT <=80%  A decrease of PCT levels below or equal to 80% defines a positive change in PCT test result representing a higher risk for 28-day all-cause mortality of patients diagnosed with severe sepsis for septic shock.    Change in PCT >80%  A decrease of PCT levels of more than 80% defines a negative change in PCT result representing a lower risk for 28-day all-cause mortality of patients diagnosed with severe sepsis or septic shock.      CBC WITH AUTO DIFFERENTIAL - Abnormal; Notable for the following components:    RBC 3.97 (*)     Hemoglobin 11.4 (*)     Hematocrit 33.6 (*)     Neutrophil % 83.2 (*)     Lymphocyte % 11.9 (*)     Monocyte % 3.2 (*)     All other components within normal limits   POC LACTATE - Normal   BLOOD CULTURE   BLOOD CULTURE   RESPIRATORY PANEL PCR W/ COVID-19 (SARS-COV-2), NP SWAB IN UTM/VTP, 3-4 HR TAT   VITAMIN B12   FOLATE   FERRITIN   IRON   POC LACTATE   CBC AND DIFFERENTIAL    Narrative:     The following orders were created for panel order CBC & Differential.  Procedure                               Abnormality         Status                     ---------                               -----------         ------                     CBC Auto " "Differential[[id number]]        Abnormal            Final result                 Please view results for these tests on the individual orders.                                          Medical Decision Making  Problems Addressed:  Acute cough: complicated acute illness or injury  Lung mass: complicated acute illness or injury  Shortness of breath: complicated acute illness or injury    Amount and/or Complexity of Data Reviewed  Labs: ordered.  Radiology: ordered.  ECG/medicine tests: ordered.    Risk  Prescription drug management.  Decision regarding hospitalization.      Differential diagnosis: Pneumonia, bronchitis, not ment to be an all inclusive list.    Chart review:  Last urgent care note on 6/8/2022, patient was seen for insect bite of inner thigh.    EKG:  see above    Imaging:    CT Chest With Contrast Diagnostic   Final Result   Impression:   Very large right upper lobe mass which extends into the right hilum and mediastinum with gross mediastinal adenopathy. Findings are compatible with malignancy. Infiltrates at the periphery of the lesion are compatible with postobstructive infiltrates.      Minimal right pleural effusion.            Electronically Signed: Anne-Marie Koroma MD     10/20/2023 4:10 PM EDT     Workstation ID: [id number]        Labs:  Lab work independently interpreted by myself shows procalcitonin slightly elevated at 0.27.  CBC and CMP largely unremarkable for acute findings although potassium slightly low at 3.4 replacement ordered.  Initial lactic normal.  Blood cultures x2 pending.    Vitals:  /70   Pulse 93   Temp 98.2 °F (36.8 °C) (Oral)   Resp 18   Ht 175.3 cm (69\")   Wt 65.7 kg (144 lb 13.5 oz)   SpO2 97%   BMI 21.39 kg/m²    Medications given:    Medications   sodium chloride 0.9 % flush 10 mL (has no administration in time range)   azithromycin (ZITHROMAX) 500 mg in sodium chloride 0.9 % 250 mL IVPB-VTB (500 mg Intravenous New Bag 10/20/23 6852)   clonazePAM (KlonoPIN) " tablet 1 mg (has no administration in time range)   sodium chloride 0.9 % flush 10 mL (has no administration in time range)   sodium chloride 0.9 % flush 10 mL (has no administration in time range)   sodium chloride 0.9 % infusion 40 mL (has no administration in time range)   acetaminophen (TYLENOL) tablet 650 mg (has no administration in time range)     Or   acetaminophen (TYLENOL) 160 MG/5ML oral solution 650 mg (has no administration in time range)     Or   acetaminophen (TYLENOL) suppository 650 mg (has no administration in time range)   aluminum-magnesium hydroxide-simethicone (MAALOX MAX) 400-400-40 MG/5ML suspension 15 mL (has no administration in time range)   sennosides-docusate (PERICOLACE) 8.6-50 MG per tablet 2 tablet (has no administration in time range)     And   polyethylene glycol (MIRALAX) packet 17 g (has no administration in time range)     And   bisacodyl (DULCOLAX) EC tablet 5 mg (has no administration in time range)     And   bisacodyl (DULCOLAX) suppository 10 mg (has no administration in time range)   ondansetron (ZOFRAN) tablet 4 mg (has no administration in time range)     Or   ondansetron (ZOFRAN) injection 4 mg (has no administration in time range)   Potassium Replacement - Follow Nurse / BPA Driven Protocol (has no administration in time range)   Magnesium Standard Dose Replacement - Follow Nurse / BPA Driven Protocol (has no administration in time range)   Phosphorus Replacement - Follow Nurse / BPA Driven Protocol (has no administration in time range)   Calcium Replacement - Follow Nurse / BPA Driven Protocol (has no administration in time range)   melatonin tablet 5 mg (has no administration in time range)   nicotine (NICODERM CQ) 21 MG/24HR patch 1 patch (has no administration in time range)   azithromycin (ZITHROMAX) 500 mg in sodium chloride 0.9 % 250 mL IVPB-VTB (has no administration in time range)   cefTRIAXone (ROCEPHIN) 1,000 mg in sodium chloride 0.9 % 100 mL IVPB (has no  administration in time range)   ipratropium-albuterol (DUO-NEB) nebulizer solution 3 mL (has no administration in time range)   clonazePAM (KlonoPIN) tablet 1 mg (has no administration in time range)   mirtazapine (REMERON) tablet 45 mg (has no administration in time range)   guaiFENesin (MUCINEX) 12 hr tablet 1,200 mg (has no administration in time range)   doxepin (SINEquan) capsule 75 mg (has no administration in time range)   sodium chloride 0.9 % bolus 1,000 mL (0 mL Intravenous Stopped 10/20/23 1840)   iopamidol (ISOVUE-370) 76 % injection 100 mL (100 mL Intravenous Given 10/20/23 1555)   potassium chloride (K-DUR,KLOR-CON) CR tablet 20 mEq (20 mEq Oral Given 10/20/23 1701)   cefTRIAXone (ROCEPHIN) 1,000 mg in sodium chloride 0.9 % 100 mL IVPB (0 mg Intravenous Stopped 10/20/23 1840)       Procedures:  not indicated  MDM: Patient is a 58-year-old male comes in complaining of shortness of breath.  Lab work independently interpreted by myself shows procalcitonin slightly elevated at 0.27.  CBC and CMP largely unremarkable for acute findings although potassium slightly low at 3.4 replacement ordered.  Initial lactic normal.  Blood cultures x2 pending.  CT chest shows findings above concerning for a large right mass of right upper lobe of the lung.  These findings discussed with patient at length and concerning for cancer given patient's over 30 years smoking history.  Patient is not tachypneic here no stridor or wheezing on exam and is not complaining of shortness of breath at rest at this time.  No fever no white count.  Will cover with azithromycin and Rocephin for now for possible pneumonia however I am likely thinking procalcitonin is elevated secondary to likely malignancy.  EKG showed no acute findings.  Spoke with DEIDRE Mancilla, accepted patient behalf of Dr. David for admission hospital further work-up and treatment.  Plan discussed with patient and is agreeable with plan.  Case discussed with attending  provider above.      Final diagnoses:   Shortness of breath   Lung mass   Acute cough       ED Disposition  ED Disposition       ED Disposition   Decision to Admit    Condition   --    Comment   Level of Care: Telemetry [5]   Diagnosis: Lung mass [[id number]]   Admitting Physician: SONIDO PACHECO [[id number]]   Attending Physician: SONIDO PACHECO [[id number]]                 No follow-up provider specified.       Medication List      No changes were made to your prescriptions during this visit.            Rajesh Rincon PA  10/20/23 1840       Rajesh Rincon PA  10/20/23 1841      Electronically signed by Ty Em MD at 10/20/23 2132       Vital Signs (last 3 days)       Date/Time Temp Temp src Pulse Resp BP Patient Position SpO2    10/23/23 1154 97.8 (36.6) Oral 98 17 107/73 Sitting 96    10/23/23 1059 -- -- 93 20 -- -- 96    10/23/23 1056 -- -- 91 22 -- -- 99    10/23/23 1055 -- -- 91 22 -- -- 98    10/23/23 1052 -- -- 95 22 -- -- 96    10/23/23 0814 -- -- 95 22 -- -- 99    10/23/23 0809 -- -- 99 23 -- -- 98    10/23/23 0806 97.4 (36.3) Oral 97 22 108/75 Sitting 97    10/23/23 0318 -- --  93 30 --  --  97    Temp src: Refused at 10/23/23 0318    BP: Refused at 10/23/23 0318    Patient Position: Refused at 10/23/23 0318    10/22/23 1953 -- -- -- 18 -- -- --    10/22/23 1950 -- -- -- 18 -- -- --    10/22/23 1944 -- -- 98 30 99/67 -- 98    10/22/23 1756 98.2 (36.8) Oral 102 16 108/68 Lying 97    10/22/23 1240 97.6 (36.4) Oral 99 13 108/67 Lying 100    10/22/23 1045 -- -- 96 20 99/58 Lying 98    10/22/23 1035 -- -- 97 24 96/54 Lying 97    10/22/23 1028 -- -- 104 22 94/49 Lying 94    10/22/23 1022 -- -- 101 22 93/45 -- 93    10/22/23 1017 -- -- 100 24 91/50 Lying 94    10/22/23 1012 -- -- 100 22 96/67 Lying 96    10/22/23 1007 -- -- 100 24 87/54 Lying 93    10/22/23 1002 -- -- 99 24 98/57 Lying 94    10/22/23 0738 98.3 (36.8) Oral 102 30 91/60 Lying 96    10/22/23 0719 -- -- 97 18 -- -- 98    10/22/23 0715 -- -- 85 22 -- -- 98     10/22/23 0357 -- -- 107 29 94/62 Lying 94    10/22/23 0329 -- -- 98 20 -- -- 99    10/22/23 0327 -- -- 94 20 -- Lying 95    10/21/23 2322 98.1 (36.7) Oral 109 29 109/62 Lying 97    10/21/23 2308 -- -- 95 22 -- -- 99    10/21/23 2307 -- -- 100 22 -- Lying 99    10/21/23 2306 -- -- 99 22 -- -- 99    10/21/23 2304 -- -- 103 22 -- Lying 96    10/21/23 1942 97.3 (36.3) Oral 102 18 106/63 Lying 96    10/21/23 1628 97.7 (36.5) Oral -- 22 102/59 Lying --    10/21/23 1505 -- -- 94 18 -- -- 97    10/21/23 1500 -- -- 95 18 -- -- 97    10/21/23 1141 97.7 (36.5) Oral -- 19 102/63 Lying --    10/21/23 1105 -- -- 99 20 -- -- 96    10/21/23 1100 -- -- 98 20 -- -- 96    10/21/23 0816 98.1 (36.7) Oral -- 24 103/60 Lying --    10/21/23 0659 -- -- 100 18 -- -- 96    10/21/23 0654 -- -- 99 18 -- -- 97    10/21/23 0423 97.6 (36.4) Oral 102 25 110/66 Lying 97    10/21/23 0240 -- -- 99 18 -- -- 100    10/21/23 0236 -- -- 91 18 -- -- 94    10/21/23 0003 98.2 (36.8) Oral 99 18 101/55 Lying 98    10/20/23 2241 -- -- 87 18 -- -- 100    10/20/23 2236 -- -- 89 18 -- -- 98    10/20/23 2042 -- -- 90 -- 107/69 -- 100    10/20/23 2000 -- -- 97 -- -- -- 99    10/20/23 1956 97.4 (36.3) Oral -- -- -- -- --    10/20/23 1816 -- -- 93 -- 123/70 -- 97    10/20/23 1631 -- -- 94 -- 112/66 -- 99    10/20/23 1516 -- -- 100 -- 104/74 -- 98    10/20/23 1441 98.2 (36.8) Oral -- -- -- -- --    10/20/23 1437 -- -- 107 18 129/69 -- 98          Oxygen Therapy (last day)       Date/Time SpO2 Device (Oxygen Therapy) Flow (L/min) Oxygen Concentration (%) ETCO2 (mmHg)    10/23/23 1154 96 room air -- -- --    10/23/23 1059 96 nasal cannula 3 -- --    10/23/23 1056 99 nasal cannula 3 -- --    10/23/23 1055 98 nasal cannula 3 -- --    10/23/23 1052 96 nasal cannula 3 -- --    10/23/23 0814 99 room air -- -- --    10/23/23 0809 98 room air -- -- --    10/23/23 0806 97 room air -- -- --    10/23/23 0318 97 nasal cannula 3 -- --    10/22/23 2000 -- nasal cannula 3 -- --     10/22/23 1953 -- nasal cannula 3 -- --    10/22/23 1950 -- nasal cannula 3 -- --    10/22/23 1944 98 -- -- -- --    10/22/23 1756 97 nasal cannula;room air -- -- --    10/22/23 1240 100 nasal cannula 3 -- --    10/22/23 1200 -- nasal cannula 3 -- --    10/22/23 1045 98 nasal cannula 3 -- --    10/22/23 1035 97 room air 3 -- --    10/22/23 1028 94 nasal cannula 4 -- --    10/22/23 1022 93 room air -- -- --    10/22/23 1017 94 room air -- -- --    10/22/23 1012 96 room air -- -- --    10/22/23 1007 93 simple face mask 5 -- --    10/22/23 1002 94 simple face mask 10 -- --    10/22/23 0814 -- room air -- -- --    10/22/23 0738 96 room air -- -- --    10/22/23 0719 98 room air -- -- --    10/22/23 0715 98 room air -- -- --    10/22/23 0357 94 room air -- -- --    10/22/23 0329 99 room air -- -- --    10/22/23 0327 95 room air -- -- --          Facility-Administered Medications as of 10/23/2023   Medication Dose Route Frequency Provider Last Rate Last Admin    acetaminophen (TYLENOL) tablet 650 mg  650 mg Oral Q4H PRN Bernice Espino APRN        Or    acetaminophen (TYLENOL) 160 MG/5ML oral solution 650 mg  650 mg Oral Q4H PRN Bernice Espino APRN        Or    acetaminophen (TYLENOL) suppository 650 mg  650 mg Rectal Q4H PRN Bernice Espino APRN        aluminum-magnesium hydroxide-simethicone (MAALOX MAX) 400-400-40 MG/5ML suspension 15 mL  15 mL Oral Q6H PRN Bernice Espino APRN        [COMPLETED] azithromycin (ZITHROMAX) 500 mg in sodium chloride 0.9 % 250 mL IVPB-VTB  500 mg Intravenous Once Rajesh Rincon PA   Stopped at 10/20/23 1944    sennosides-docusate (PERICOLACE) 8.6-50 MG per tablet 2 tablet  2 tablet Oral BID Bernice Espino APRN   2 tablet at 10/22/23 1156    And    polyethylene glycol (MIRALAX) packet 17 g  17 g Oral Daily PRN Bernice Espino APRN        And    bisacodyl (DULCOLAX) EC tablet 5 mg  5 mg Oral Daily PRN Bernice Espino APRN        And    bisacodyl (DULCOLAX) suppository 10 mg  10 mg Rectal  Daily PRN Bernice Espino APRN        budesonide (PULMICORT) nebulizer solution 0.5 mg  0.5 mg Nebulization BID - RT Fara Catalan APRN   0.5 mg at 10/23/23 1052    Calcium Replacement - Follow Nurse / BPA Driven Protocol   Does not apply PRN Bernice Espino APRN        [COMPLETED] cefTRIAXone (ROCEPHIN) 1,000 mg in sodium chloride 0.9 % 100 mL IVPB  1,000 mg Intravenous Once Rajesh Rincon PA   Stopped at 10/20/23 1840    cefTRIAXone (ROCEPHIN) 1,000 mg in sodium chloride 0.9 % 100 mL IVPB  1,000 mg Intravenous Q24H Bernice Espino APRN 200 mL/hr at 10/22/23 1728 1,000 mg at 10/22/23 1728    [COMPLETED] clonazePAM (KlonoPIN) tablet 1 mg  1 mg Oral Once Bernice Espino APRN   1 mg at 10/20/23 2034    clonazePAM (KlonoPIN) tablet 1 mg  1 mg Oral BID PRN Bernice Espino APRN   1 mg at 10/23/23 0803    dexAMETHasone (DECADRON) tablet 4 mg  4 mg Oral Q12H Jeanette Rubio MD        doxepin (SINEquan) capsule 75 mg  75 mg Oral Nightly Bernice Espino APRN   75 mg at 10/22/23 2046    folic acid (FOLVITE) tablet 1 mg  1 mg Oral Daily Jeanette Rubio MD   1 mg at 10/23/23 0800    [COMPLETED] gadoteridol (PROHANCE) injection 15 mL  15 mL Intravenous Once in imaging Olamide Dugan MD   13 mL at 10/22/23 1615    guaiFENesin (MUCINEX) 12 hr tablet 1,200 mg  1,200 mg Oral BID Bernice Espino APRN   1,200 mg at 10/23/23 0759    [COMPLETED] iopamidol (ISOVUE-370) 76 % injection 100 mL  100 mL Intravenous Once in imaging Ty Em MD   100 mL at 10/20/23 1555    [COMPLETED] iopamidol (ISOVUE-370) 76 % injection 100 mL  100 mL Intravenous Once in imaging Jeovanny Griggs MD   100 mL at 10/21/23 1723    ipratropium-albuterol (DUO-NEB) nebulizer solution 3 mL  3 mL Nebulization Q4H - RT Bernice Espino, DEIDRE   3 mL at 10/23/23 1052    [COMPLETED] LORazepam (ATIVAN) injection 1 mg  1 mg Intravenous Once Jeanette Rubio MD   1 mg at 10/22/23 1523    [COMPLETED] LORazepam (ATIVAN) tablet 1 mg  1 mg Oral Once Jeovanny Griggs MD   1  mg at 10/21/23 1655    Magnesium Standard Dose Replacement - Follow Nurse / BPA Driven Protocol   Does not apply PRN Bernice Espino APRN        melatonin tablet 5 mg  5 mg Oral Nightly PRN Bernice Espino APRN        mirtazapine (REMERON) tablet 45 mg  45 mg Oral Nightly Bernice Espino APRN   45 mg at 10/22/23 2046    nicotine (NICODERM CQ) 21 MG/24HR patch 1 patch  1 patch Transdermal Q24H Bernice Espino APRN   1 patch at 10/22/23 1729    ondansetron (ZOFRAN) tablet 4 mg  4 mg Oral Q6H PRN Bernice Espino APRN        Or    ondansetron (ZOFRAN) injection 4 mg  4 mg Intravenous Q6H PRN Bernice Espino APRN        Phosphorus Replacement - Follow Nurse / BPA Driven Protocol   Does not apply PRN Bernice Espino APRN        [COMPLETED] potassium chloride (K-DUR,KLOR-CON) CR tablet 20 mEq  20 mEq Oral Once Rajesh Rincon PA   20 mEq at 10/20/23 1701    [COMPLETED] potassium chloride (K-DUR,KLOR-CON) CR tablet 40 mEq  40 mEq Oral Q4H Jeovanny Griggs MD   40 mEq at 10/21/23 2054    Potassium Replacement - Follow Nurse / BPA Driven Protocol   Does not apply PRN Bernice Espino APRN        [COMPLETED] sodium chloride 0.9 % bolus 1,000 mL  1,000 mL Intravenous Once Nancy Mg APRN   Stopped at 10/20/23 1840    sodium chloride 0.9 % flush 10 mL  10 mL Intravenous PRN Nancy Mg APRN        sodium chloride 0.9 % flush 10 mL  10 mL Intravenous Q12H Bernice Espino APRN   10 mL at 10/23/23 0800    sodium chloride 0.9 % flush 10 mL  10 mL Intravenous PRN Bernice Espino APRN        sodium chloride 0.9 % infusion 40 mL  40 mL Intravenous PRN Bernice Espino APRN            Operative/Procedure Notes (all)        Procedures signed by Genia Jenkins MD at 10/22/23 1005   Version 1 of 1       Procedure Orders    1. BRONCHOSCOPY [[id number]] ordered by Genia Jenkins MD at 10/22/23 0913               [Media Unavailable] Scan on 10/22/2023 0913 by Genia Jenkins MD: BRONCH          Electronically signed by  Genia Jenkins MD at 10/22/23 1005       Genia Jenkins MD at 10/22/23 0929  Version 2 of 2         Bronchoscopy Procedure Note    Procedure:  Bronchoscopy, Diagnostic  Bronchoalveolar lavage, BAL  Endobronchial biopsy with cryoprobe  Transbronchial FNA    Pre-Operative Diagnosis: Right lung mass with mediastinal and hilar lymphadenopathy    Post-Operative Diagnosis: Same    Indication: Right lung mass with mediastinal and hilar lymphadenopathy    Anesthesia: Monitored Anesthesia Care (MAC)    Procedure Details: Patient was consented for the procedure with all risk and benefit of the procedure explained in detail.  Patient was given the opportunity to ask questions and all concerns were answered.    Timeout was done in the standard manner   the bronchoscope was inserted into the main airway via the oropharynx. An anatomical survey was done of the main airways and the subsegmental bronchus of the 5 lobes.  The findings are consistent of endobronchial mass obstructing 80% of the right mainstem with the right upper lobe bronchus still patent 20% and the right lower lobe patent around 10%.  A bronchoalveolar lavage was performed in the right lower lobe using 90 mL normal saline instilled into the airways then aspirated back 30 mL.  Transbronchial FNA with a size 19-gauge needle was performed in 2 sites, and right hilar area x2, right paratracheal area x1.  Endobronchial cryobiopsy was performed in the right mainstem mass x5, the mild bleeding was controlled with instilling cold saline.    Estimated Blood Loss: Minimal           Specimens: BAL from right lower lobe, transbronchial FNA from 2 lymph node stations, endobronchial cryobiopsy from right mainstem mass                Complications:  None; patient tolerated the procedure well.           Disposition: PACU - hemodynamically stable.    Post op plan:  Resume p.o. after 2 hours  Follow-up results      Patient tolerated the procedure well.    Electronically signed by  Genia Jenkins MD at 10/22/23 1050       Genia Jenkins MD at 10/22/23 0929  Version 1 of 2         Bronchoscopy Procedure Note    Procedure:  Bronchoscopy, Diagnostic  Bronchoalveolar lavage, BAL  Endobronchial biopsy with cryoprobe  Transbronchial FNA    Pre-Operative Diagnosis: Right lung mass with mediastinal and hilar lymphadenopathy    Post-Operative Diagnosis: Same    Indication: Right lung mass with mediastinal and hilar lymphadenopathy    Anesthesia: Monitored Anesthesia Care (MAC)    Procedure Details: Patient was consented for the procedure with all risk and benefit of the procedure explained in detail.  Patient was given the opportunity to ask questions and all concerns were answered.    Timeout was done in the standard manner   the bronchoscope was inserted into the main airway via the oropharynx. An anatomical survey was done of the main airways and the subsegmental bronchus of the 5 lobes.  The findings are consistent of endobronchial mass obstructing 80% of the right mainstem with the right upper lobe bronchus still patent 20% and the right lower lobe patent around 10%.  A bronchoalveolar lavage was performed in the right lower lobe using 90 mL normal saline instilled into the airways then aspirated back 30 mL.  Transbronchial FNA with a size 19-gauge needle was performed in 2 sites, and right hilar area x2, right paratracheal area x1.  Endobronchial cryobiopsy was performed in the right mainstem mass x5, the mild bleeding was controlled with instilling cold saline.    Estimated Blood Loss: Minimal           Specimens: BAL from right lower lobe, transbronchial FNA from 2 lymph node stations, endobronchial cryobiopsy from right mainstem mass                Complications:  None; patient tolerated the procedure well.           Disposition: PACU - hemodynamically stable.    Post op plan:  Resume p.o. after 2 hours  Follow-up results  If the patient is clinically stable can be discharged later this  "evening with follow-up in our office in 4 days.    Patient tolerated the procedure well.    Electronically signed by Genia Jenkins MD at 10/22/23 1004          Physician Progress Notes (all)        Fara Catalan APRN at 10/23/23 0941          PULMONARY CRITICAL CARE PROGRESS  NOTE      PATIENT IDENTIFICATION:  Name: Romero Maciel  MRN: [id number]  :  1965     Age: 58 y.o.  Sex: male    DATE OF Note:  10/23/2023   Referring Physician: Olamide Dugan MD                  Subjective:   In bed resting, on O2, no SOB, no chest or abd pain, no bowel or bladder issues reported    Objective:  tMax 24 hrs: Temp (24hrs), Av.7 °F (36.5 °C), Min:97.4 °F (36.3 °C), Max:98.2 °F (36.8 °C)      Vitals Ranges:   Temp:  [97.4 °F (36.3 °C)-98.2 °F (36.8 °C)] 97.4 °F (36.3 °C)  Heart Rate:  [] 95  Resp:  [13-30] 22  BP: ()/(45-75) 108/75    Intake and Output Last 3 Shifts:   I/O last 3 completed shifts:  In: 740 [P.O.:240; I.V.:500]  Out: -     Exam:  /75 (BP Location: Right arm, Patient Position: Sitting)   Pulse 95   Temp 97.4 °F (36.3 °C) (Oral)   Resp 22   Ht 175.3 cm (69\")   Wt 65.7 kg (144 lb 13.5 oz)   SpO2 99%   BMI 21.39 kg/m²     General Appearance:   Alert  HEENT:  Normocephalic, without obvious abnormality. Conjunctivae/corneas clear.  Normal external ear canals. Nares normal, no drainage     Neck:  Supple, symmetrical, trachea midline. No JVD.  Lungs /Chest wall:   Bilateral basal rhonchi, respirations unlabored, symmetrical wall movement.     Heart:  Regular rate and rhythm, systolic murmur PMI left sternal border  Abdomen: Soft, nontender, no masses, no organomegaly.    Extremities: Trace edema, no clubbing or cyanosis        Medications:  cefTRIAXone, 1,000 mg, Intravenous, Q24H  dexAMETHasone, 4 mg, Oral, Q12H  doxepin, 75 mg, Oral, Nightly  folic acid, 1 mg, Oral, Daily  guaiFENesin, 1,200 mg, Oral, BID  ipratropium-albuterol, 3 mL, Nebulization, Q4H - RT  mirtazapine, 45 " mg, Oral, Nightly  nicotine, 1 patch, Transdermal, Q24H  senna-docusate sodium, 2 tablet, Oral, BID  sodium chloride, 10 mL, Intravenous, Q12H        Infusion:        PRN:    acetaminophen **OR** acetaminophen **OR** acetaminophen    aluminum-magnesium hydroxide-simethicone    senna-docusate sodium **AND** polyethylene glycol **AND** bisacodyl **AND** bisacodyl    Calcium Replacement - Follow Nurse / BPA Driven Protocol    clonazePAM    Magnesium Standard Dose Replacement - Follow Nurse / BPA Driven Protocol    melatonin    ondansetron **OR** ondansetron    Phosphorus Replacement - Follow Nurse / BPA Driven Protocol    Potassium Replacement - Follow Nurse / BPA Driven Protocol    [COMPLETED] Insert Peripheral IV **AND** sodium chloride    sodium chloride    sodium chloride  Data Review:  All labs (24hrs): No results found for this or any previous visit (from the past 24 hour(s)).     Imaging:  MRI Brain With & Without Contrast  Narrative: MRI BRAIN W WO CONTRAST    Date of Exam: 10/22/2023 3:35 PM EDT    Indication: Brain metastases suspected.     Comparison: None available.    Technique:  Routine multiplanar/multisequence sequence images of the brain were obtained before and after the uneventful administration of Prohance.    FINDINGS:  There is a 1.7 cm peripherally enhancing mass within the right occipital lobe with mild surrounding vasogenic edema. This is concerning for a neoplastic lesion, and there is some intrinsic T1 signal hyperintensity which may indicate the presence of blood   products. Multiple tiny foci of T2/FLAIR signal hyperintensity are seen within the bilateral hemispheric white matter. Midline structures appear unremarkable. No significant mass effect, intracranial hemorrhage, or hydrocephalus is identified. No   abnormal contrast enhancement is seen. Diffusion-weighted sequences demonstrate no acute infarct.The visualized intracranial flow-voids appear unremarkable. The paranasal sinuses and  mastoid air cells show no fluid signal. The orbits, globes, retrobulbar   soft tissues appear unremarkable. The visualized superficial soft tissues and cervical spine demonstrate no significant abnormality.  Impression: 1.1.7 cm peripherally enhancing mass within the right occipital lobe with mild surrounding edema, concerning for brain metastasis given findings on recent chest CT. This lesion demonstrates some intrinsic T1 signal hyperintensity which may indicate the   presence of blood products.  2.Additional findings compatible with chronic microvascular ischemic change.  3.Please see above for additional details.    Electronically Signed: Sherwin Dela Cruz MD    10/22/2023 5:05 PM EDT    Workstation ID: [id number]  [place]  XR Chest 1 View  Narrative: XR CHEST 1 VW    Date of Exam: 10/22/2023 10:40 AM EDT    Indication: post bronchoscopy with biopsy    Comparison: None available.    Findings:  There is dense right upper lobe airspace disease with thickening of the right paratracheal stripe. No pneumothorax is seen, status post bronchoscopy. Left lung remains adequately aerated.    Cardiac silhouette is unremarkable. No acute osseous lesion is seen.  Impression: Impression:  There is dense right upper lobe airspace disease with thickening of the right paratracheal stripe. No pneumothorax is seen, status post bronchoscopy.     Please correlate with findings on chest CT from 10/20/2023.    Electronically Signed: Sherwin Dela Cruz MD    10/22/2023 10:46 AM EDT    Workstation ID: [id number]       ASSESSMENT:  Lung mass right upper lobe with mediastinal lymphadenopathy with brain mass likely mets  Postobstructive pneumonia  Anemia  Tobacco abuse  Anxiety/depression       PLAN:  Decadron ordered today per oncology  S/p bronchoscopy  Wean O2 to keep sats greater than 88%  Antibiotics  Bronchodilators  Inhaled corticosteroids  Incentive spirometer  Smoking cessation counseling  Nicotine patch  Oncology consulted  Electrolytes/  glycemic control  DVT and GI prophylaxis.    Discussed with DEIDRE Massey  10/23/2023  09:41 EDT      Electronically signed by Fara Catalan APRN at 10/23/23 0946       Olamide Dugan MD at 10/22/23 0941              [facility]   Daily Progress Note    Patient Name: Romero Maciel  : 1965  MRN: [id number]  Primary Care Physician:  Jennifer Maynard APRN  Date of admission: 10/20/2023  Date of service 10/21/2023      Subjective      Chief Complaint: Chest congestion    Patient seen and examined after bronchoscopy.  Patient denies any complaints at this time.  Vital signs reviewed and stable.  Patient on 2 L nasal cannula.    ROS   A 12 point review of system was done and was negative except as mentioned above      Objective      Vitals:   Temp:  [97.3 °F (36.3 °C)-98.3 °F (36.8 °C)] 98.3 °F (36.8 °C)  Heart Rate:  [] 102  Resp:  [18-30] 30  BP: ()/(59-63) 91/60    General Appearance: AOO x 4, cooperative, no distress, appropriate for age  Head:  Normocephalic, without obvious abnormality  Eyes:  PERRL, EOM's intact, conjunctivae and cornea clear  Nose:  Nares symmetrical, septum midline, mucosa pink  Throat:  Lips, tongue, and mucosa are moist, pink, and intact  Neck:  Supple; symmetrical, trachea midline, no adenopathy  Back:  Symmetrical, ROM normal, no CVA tenderness  Lungs: Coarse breath sounds bilaterally.  Positive wheezing  Heart: Regular rate & rhythm, S1 and S2 normal  Abdomen:  Soft, nontender, bowel sounds active all four quadrants  Musculoskeletal: Tone and strength strong and symmetrical, all extremities; no joint pain or edema         Skin/Hair/Nails:  Skin warm, dry and intact, no rashes or abnormal dyspigmentation            Result Review    Result Review:  I have personally reviewed the results from the time of this admission to 10/22/2023 09:44 EDT and agree with these findings:  [x]  Laboratory  []  Microbiology  [x]   Radiology  []  EKG/Telemetry   []  Cardiology/Vascular   []  Pathology  []  Old records  []  Other:            Assessment & Plan      Brief Patient Summary:   Romero Maciel is a 58 y.o. male with past medical history smoking 2 pack/day smoker for the past 30 years who presented to [facility] on 10/20/2023 complaining of shortness of breath, cough, decreased appetite and abnormal chest xray. He additionally reports 20 lbs unintentional weight  loss over the past 6 weeks, and his voice being hoarse over the past couple days. He was seen yesterday by pcp who ordered a chest xray he had completed at [facility] with radiology report with him that looks like a right upper lobe mass versus infiltrate and was advised to come to the ED for further evaluation. He denies fever, or chest pain, nausea, vomiting, diarrhea, or urinary complaints.      In the ED, CT of chest interpreted by radiologist shows a very large right upper lobe mass which extends into the right hilum and mediastinum with gross mediastinal adenopathy. Findings are compatible with malignancy. Infiltrates at the periphery of the lesion are compatible with postobstructive infiltrates.       cefTRIAXone, 1,000 mg, Intravenous, Q24H  doxepin, 75 mg, Oral, Nightly  folic acid, 1 mg, Oral, Daily  guaiFENesin, 1,200 mg, Oral, BID  ipratropium-albuterol, 3 mL, Nebulization, Q4H - RT  mirtazapine, 45 mg, Oral, Nightly  nicotine, 1 patch, Transdermal, Q24H  senna-docusate sodium, 2 tablet, Oral, BID  sodium chloride, 10 mL, Intravenous, Q12H             Active Hospital Problems:  Active Hospital Problems    Diagnosis     **Lung mass      Plan:   Lung mass  CT report reviewed.    Pulmonary and oncology already consulted.   Status post bronchoscopy today, 10/22  CT abdomen negative for any acute findings  MRI head pending  Biopsy for further diagnosis and staging and management.    Pneumonia  Postobstructive pneumonia.    Continue  Rocephin    COPD/tobacco abuse  Bronchodilator therapy.    Nicotine patch advised patient to quit.    Electrolyte imbalance- Resolved  Monitor electrolytes.   Follow-up BMP      DVT prophylaxis:  Mechanical DVT prophylaxis orders are present.    CODE STATUS:    Code Status (Patient has no pulse and is not breathing): CPR (Attempt to Resuscitate)  Medical Interventions (Patient has pulse or is breathing): Full Support      Disposition:  I expect patient to be discharged 2 to 3 days          Electronically signed by Olamide Dugan MD, 10/22/23, 09:44 EDT.  [facility] Hospitalist Team             Electronically signed by Olamide Dugan MD at 10/22/23 1454       Jeanette Rubio MD at 10/22/23 0938                Hematology/Oncology Inpatient Progress Note    PATIENT NAME: Romero Maciel  : 1965  MRN: [id number]    Chief complaint:      History of present illness:    Romero Maciel is a 58 y.o. male who presented to [facility] on 10/20/2023 with complaints of cough, abnormal chest xray.     Patient is a 58-year-old male with history of 60 pack year smoking who has been complaining of shortness of breath, cough weight loss of over 20 pounds over the last few months.  He also started to have hoarseness of voice.  He was seen by PCP and a chest x-ray was ordered which was abnormal patient was advised to come to the ER     10/20/2023 CT chest with very large right upper lobe mass which extends into the right hilum and mediastinum with gross mediastinal adenopathy findings compatible with malignancy.  Postobstructive infiltrates.  Minimal right pleural effusion     10/21/23  Hematology/Oncology was consulted      He/She  has no past medical history on file.    Subjective   No new symptoms overnight.        MEDICATIONS:    Scheduled Meds:  cefTRIAXone, 1,000 mg, Intravenous, Q24H  doxepin, 75 mg, Oral, Nightly  guaiFENesin, 1,200 mg, Oral, BID  ipratropium-albuterol, 3 mL, Nebulization, Q4H -  "RT  mirtazapine, 45 mg, Oral, Nightly  nicotine, 1 patch, Transdermal, Q24H  senna-docusate sodium, 2 tablet, Oral, BID  sodium chloride, 10 mL, Intravenous, Q12H       Continuous Infusions:      PRN Meds:    acetaminophen **OR** acetaminophen **OR** acetaminophen    aluminum-magnesium hydroxide-simethicone    senna-docusate sodium **AND** polyethylene glycol **AND** bisacodyl **AND** bisacodyl    Calcium Replacement - Follow Nurse / BPA Driven Protocol    clonazePAM    lidocaine    lidocaine    Magnesium Standard Dose Replacement - Follow Nurse / BPA Driven Protocol    melatonin    ondansetron **OR** ondansetron    Phosphorus Replacement - Follow Nurse / BPA Driven Protocol    Potassium Replacement - Follow Nurse / BPA Driven Protocol    [COMPLETED] Insert Peripheral IV **AND** sodium chloride    sodium chloride    sodium chloride     ALLERGIES:    Allergies   Allergen Reactions    Penicillins Anaphylaxis       Objective    VITALS:   BP 91/60 (BP Location: Left arm, Patient Position: Lying)   Pulse 102   Temp 98.3 °F (36.8 °C) (Oral)   Resp (!) 30   Ht 175.3 cm (69\")   Wt 65.7 kg (144 lb 13.5 oz)   SpO2 96%   BMI 21.39 kg/m²     PHYSICAL EXAM: (performed by MD)  Physical Exam  Constitutional:       Appearance: Normal appearance.   HENT:      Head: Normocephalic and atraumatic.   Eyes:      Pupils: Pupils are equal, round, and reactive to light.   Cardiovascular:      Rate and Rhythm: Normal rate and regular rhythm.      Pulses: Normal pulses.      Heart sounds: No murmur heard.  Pulmonary:      Effort: Pulmonary effort is normal.      Breath sounds: Normal breath sounds.   Abdominal:      General: There is no distension.      Palpations: Abdomen is soft. There is no mass.      Tenderness: There is no abdominal tenderness.   Musculoskeletal:         General: Normal range of motion.      Cervical back: Normal range of motion and neck supple.   Skin:     General: Skin is warm.   Neurological:      General: No " focal deficit present.      Mental Status: He is alert.   Psychiatric:         Mood and Affect: Mood normal.           RECENT LABS:  Lab Results (last 24 hours)       Procedure Component Value Units Date/Time    Basic Metabolic Panel [[id number]]  (Abnormal) Collected: 10/22/23 0402    Specimen: Blood Updated: 10/22/23 0528     Glucose 94 mg/dL      BUN 4 mg/dL      Creatinine 0.86 mg/dL      Sodium 136 mmol/L      Potassium 4.7 mmol/L      Comment: Result checked          Chloride 104 mmol/L      CO2 22.0 mmol/L      Calcium 8.5 mg/dL      BUN/Creatinine Ratio 4.7     Anion Gap 10.0 mmol/L      eGFR 99.7 mL/min/1.73     Narrative:      GFR Normal >60  Chronic Kidney Disease <60  Kidney Failure <15      CBC & Differential [[id number]]  (Abnormal) Collected: 10/22/23 0402    Specimen: Blood Updated: 10/22/23 0456    Narrative:      The following orders were created for panel order CBC & Differential.  Procedure                               Abnormality         Status                     ---------                               -----------         ------                     CBC Auto Differential[[id number]]        Abnormal            Final result                 Please view results for these tests on the individual orders.    CBC Auto Differential [[id number]]  (Abnormal) Collected: 10/22/23 0402    Specimen: Blood Updated: 10/22/23 0456     WBC 5.30 10*3/mm3      RBC 3.50 10*6/mm3      Hemoglobin 10.1 g/dL      Hematocrit 29.5 %      MCV 84.3 fL      MCH 28.7 pg      MCHC 34.1 g/dL      RDW 15.0 %      RDW-SD 47.3 fl      MPV 7.4 fL      Platelets 299 10*3/mm3      Neutrophil % 64.5 %      Lymphocyte % 26.2 %      Monocyte % 5.3 %      Eosinophil % 3.2 %      Basophil % 0.8 %      Neutrophils, Absolute 3.40 10*3/mm3      Lymphocytes, Absolute 1.40 10*3/mm3      Monocytes, Absolute 0.30 10*3/mm3      Eosinophils, Absolute 0.20 10*3/mm3      Basophils, Absolute 0.00 10*3/mm3      nRBC 0.1 /100 WBC     Vitamin B12  [[id number]]  (Normal) Collected: 10/21/23 1212    Specimen: Blood Updated: 10/21/23 1806     Vitamin B-12 276 pg/mL     Narrative:      Results may be falsely increased if patient taking Biotin.      Folate [[id number]]  (Abnormal) Collected: 10/21/23 1212    Specimen: Blood Updated: 10/21/23 1806     Folate <2.00 ng/mL     Narrative:      Results may be falsely increased if patient taking Biotin.      Blood Culture - Blood, Arm, Right [[id number]]  (Normal) Collected: 10/20/23 1536    Specimen: Blood from Arm, Right Updated: 10/21/23 1546     Blood Culture No growth at 24 hours    Blood Culture - Blood, Arm, Left [[id number]]  (Normal) Collected: 10/20/23 1459    Specimen: Blood from Arm, Left Updated: 10/21/23 1516     Blood Culture No growth at 24 hours    Basic Metabolic Panel [[id number]]  (Abnormal) Collected: 10/21/23 1212    Specimen: Blood Updated: 10/21/23 1245     Glucose 89 mg/dL      BUN 6 mg/dL      Creatinine 0.95 mg/dL      Sodium 135 mmol/L      Potassium 3.5 mmol/L      Chloride 100 mmol/L      CO2 23.0 mmol/L      Calcium 8.7 mg/dL      BUN/Creatinine Ratio 6.3     Anion Gap 12.0 mmol/L      eGFR 92.2 mL/min/1.73     Narrative:      GFR Normal >60  Chronic Kidney Disease <60  Kidney Failure <15      CBC Auto Differential [[id number]]  (Abnormal) Collected: 10/21/23 1212    Specimen: Blood Updated: 10/21/23 1222     WBC 5.30 10*3/mm3      RBC 3.44 10*6/mm3      Hemoglobin 9.7 g/dL      Hematocrit 29.1 %      MCV 84.5 fL      MCH 28.1 pg      MCHC 33.3 g/dL      RDW 14.9 %      RDW-SD 45.9 fl      MPV 7.2 fL      Platelets 267 10*3/mm3      Neutrophil % 74.3 %      Lymphocyte % 18.8 %      Monocyte % 4.2 %      Eosinophil % 1.8 %      Basophil % 0.9 %      Neutrophils, Absolute 3.90 10*3/mm3      Lymphocytes, Absolute 1.00 10*3/mm3      Monocytes, Absolute 0.20 10*3/mm3      Eosinophils, Absolute 0.10 10*3/mm3      Basophils, Absolute 0.00 10*3/mm3      nRBC 0.1 /100 WBC             IMAGING REVIEWED:  CT  Abdomen Pelvis With Contrast    Result Date: 10/21/2023  Impression: 1.No acute process nor metastatic disease identified within the abdomen nor pelvis. Electronically Signed: Corky Abrams MD  10/21/2023 4:35 PM CDT  Workstation ID: [id number]    CT Chest With Contrast Diagnostic    Result Date: 10/20/2023  Impression: Very large right upper lobe mass which extends into the right hilum and mediastinum with gross mediastinal adenopathy. Findings are compatible with malignancy. Infiltrates at the periphery of the lesion are compatible with postobstructive infiltrates. Minimal right pleural effusion. Electronically Signed: Anne-Marei Koroma MD  10/20/2023 4:10 PM EDT  Workstation ID: [id number]     Assessment & Plan     Patient is a 58-year-old male with chronic smoking history who presents with cough, shortness of breath CT imaging concerning for a large right upper lobe mass mediastinal adenopathy     Lung mass  Large right upper lobe lung mass with infiltrate in the right hilum  Significantly enlarged mediastinal adenopathy likely lung cancer with metastasis to the lymph node  Pulmonary has been consulted plan for bronchoscopy  We will send biopsy sample for NGS, PD-L1 testing if non-small cell lung cancer  Await bronchoscopy and biopsy results plan for bronch today   Complete CT abdomen pelvis no acute process, will get MRI brain as well once confirmed.      Anemia  Normocytic anemia  Iron level is low however ferritin is high which could be an acute phase reactant  Monitor CBC obtain complete iron profile including iron saturation B12 and folate levels  Low folate, start replacement.                    Electronically signed by Jeanette Rubio MD at 10/22/23 1614       Genia Jenkins MD at 10/22/23 0900          Daily Progress Note          Assessment    Lung mass: Right upper lobe infiltrate in right hilum with enlarged mediastinal lymphadenopathy compatible with bronchogenic malignancy  Postobstructive  pneumonia  Anemia  Tobacco smoking  History of anxiety and depression        Recommendations:  Schedule bronchoscopy 10/22/2023    Oxygen supplement and titration to maintain saturation 90 to 95%  Bronchodilators  Antibiotics: Upon discharge patient can be switched to Ceftin for 5 days  Mucinex  Smoking cessation counseling, nicotine patch  Oncology consulted, CT scan of the abdomen pelvis shows no metastasis.                      LOS: 0 days     Subjective     Patient reports mild cough, mild shortness of breath on exertion.    Objective     Vital signs for last 24 hours:  Vitals:    10/22/23 0357 10/22/23 0715 10/22/23 0719 10/22/23 0738   BP: 94/62   91/60   BP Location: Left arm   Left arm   Patient Position: Lying   Lying   Pulse: 107 85 97 102   Resp: (!) 29 22 18 (!) 30   Temp:    98.3 °F (36.8 °C)   TempSrc:    Oral   SpO2: 94% 98% 98% 96%   Weight:       Height:           Intake/Output last 3 shifts:  I/O last 3 completed shifts:  In: -   Out: 550 [Urine:550]  Intake/Output this shift:  I/O this shift:  In: 500 [I.V.:500]  Out: -       Radiology  Imaging Results (Last 24 Hours)       Procedure Component Value Units Date/Time    CT Abdomen Pelvis With Contrast [[id number]] Collected: 10/21/23 1732     Updated: 10/21/23 1737    Narrative:      CT ABDOMEN PELVIS W CONTRAST    Date of Exam: 10/21/2023 4:00 PM CDT    Indication: Rule out additional masses.    Comparison: CT chest 10/21/2023    Technique: Axial CT images were obtained of the abdomen and pelvis following the uneventful intravenous administration of 100 cc of Isovue-370. Sagittal and coronal reconstructions were performed.  Automated exposure control and iterative reconstruction   methods were used.        Findings:  LUNG BASES: Minimal right basal atelectasis with trace right effusion. There is a thin pericardial effusion measuring up to 7 mm anteriorly. Findings are similar to prior exam.    LIVER: Few cysts and other hypodensities that are too  small to characterize. No suspicious hepatic lesion identified.  BILIARY/GALLBLADDER: Cholecystectomy  SPLEEN:  Unremarkable  PANCREAS:  Unremarkable  ADRENAL:  Unremarkable  KIDNEYS:  Unremarkable parenchyma with no solid mass identified. No obstruction.  No calculus identified.  GASTROINTESTINAL/MESENTERY:  No evidence of obstruction nor inflammation.    MESENTERIC VESSELS:  Patent.  AORTA/IVC:  Normal caliber.    RETROPERITONEUM/LYMPH NODES:  Unremarkable    REPRODUCTIVE:  Unremarkable  BLADDER:  Unremarkable    OSSEUS STRUCTURES:  Typical for age with no acute process identified.        Impression:      Impression:  1.No acute process nor metastatic disease identified within the abdomen nor pelvis.            Electronically Signed: Corky Abrams MD    10/21/2023 4:35 PM CDT    Workstation ID: [id number]            Labs:  Results from last 7 days   Lab Units 10/22/23  0402   WBC 10*3/mm3 5.30   HEMOGLOBIN g/dL 10.1*   HEMATOCRIT % 29.5*   PLATELETS 10*3/mm3 299     Results from last 7 days   Lab Units 10/22/23  0402 10/21/23  1212 10/20/23  1459   SODIUM mmol/L 136   < > 132*   POTASSIUM mmol/L 4.7   < > 3.4*   CHLORIDE mmol/L 104   < > 93*   CO2 mmol/L 22.0   < > 24.0   BUN mg/dL 4*   < > 8   CREATININE mg/dL 0.86   < > 1.13   CALCIUM mg/dL 8.5*   < > 9.6   BILIRUBIN mg/dL  --   --  0.4   ALK PHOS U/L  --   --  158*   ALT (SGPT) U/L  --   --  25   AST (SGOT) U/L  --   --  49*   GLUCOSE mg/dL 94   < > 108*    < > = values in this interval not displayed.         Results from last 7 days   Lab Units 10/20/23  1459   ALBUMIN g/dL 3.6                               Meds:   SCHEDULE  cefTRIAXone, 1,000 mg, Intravenous, Q24H  doxepin, 75 mg, Oral, Nightly  folic acid, 1 mg, Oral, Daily  guaiFENesin, 1,200 mg, Oral, BID  ipratropium-albuterol, 3 mL, Nebulization, Q4H - RT  mirtazapine, 45 mg, Oral, Nightly  nicotine, 1 patch, Transdermal, Q24H  senna-docusate sodium, 2 tablet, Oral, BID  sodium chloride, 10 mL,  Intravenous, Q12H      Infusions     PRNs    acetaminophen **OR** acetaminophen **OR** acetaminophen    aluminum-magnesium hydroxide-simethicone    senna-docusate sodium **AND** polyethylene glycol **AND** bisacodyl **AND** bisacodyl    Calcium Replacement - Follow Nurse / BPA Driven Protocol    clonazePAM    lidocaine    lidocaine    Magnesium Standard Dose Replacement - Follow Nurse / BPA Driven Protocol    melatonin    ondansetron **OR** ondansetron    Phosphorus Replacement - Follow Nurse / BPA Driven Protocol    Potassium Replacement - Follow Nurse / BPA Driven Protocol    [COMPLETED] Insert Peripheral IV **AND** sodium chloride    sodium chloride    sodium chloride    Physical Exam:  General Appearance:  Alert   HEENT:  Normocephalic, without obvious abnormality, Conjunctiva/corneas clear,.   Nares normal, no drainage     Neck:  Supple, symmetrical, trachea midline.   Lungs /Chest wall:   Mild right-sided rhonchi, respirations unlabored, symmetrical wall movement.     Heart:  Regular rate and rhythm, S1 S2 normal  Abdomen: Soft, non-tender, no masses, no organomegaly.    Extremities: No edema, no clubbing or cyanosis     ROS  Constitutional: Negative for chills, fever and malaise/fatigue.   HENT: Negative.    Eyes: Negative.    Cardiovascular: Negative.    Respiratory: Positive for mild cough and shortness of breath.    Skin: Negative.    Musculoskeletal: Negative.    Gastrointestinal: Negative.    Genitourinary: Negative.    Neurological: Negative.    Psychiatric/Behavioral: Negative.      I reviewed the recent clinical results    Part of this note may be an electronic transcription/translation of spoken language to printed text using the Dragon Dictation System.      Electronically signed by Genia Jenkins MD at 10/22/23 1050       Jeovanny Griggs MD at 10/21/23 1137              [facility] Medicine Services   Daily Progress Note    Patient Name: Romero Maciel  : 1965  MRN:  [id number]  Primary Care Physician:  Jennifer Maynard APRN  Date of admission: 10/20/2023  Date of service 10/21/2023      Subjective      Chief Complaint: Chest congestion    Patient Reports no chest pain.  Mild shortness of breath.  No wheezing.  No definite chest pain.  No dizziness no lightheadedness.  No abdominal pain nausea or vomiting.  No fever or chills.  No body aches.  No hemoptysis    ROS A 12 point review of system was done and was negative except as mentioned above      Objective      Vitals:   Temp:  [97.4 °F (36.3 °C)-98.2 °F (36.8 °C)] 98.1 °F (36.7 °C)  Heart Rate:  [] 99  Resp:  [18-25] 20  BP: (101-129)/(55-74) 103/60    Physical Exam  Constitutional:       Appearance: Normal appearance.   HENT:      Head: Normocephalic and atraumatic.      Nose: Nose normal.   Cardiovascular:      Rate and Rhythm: Normal rate.      Heart sounds: Normal heart sounds.   Pulmonary:      Effort: Pulmonary effort is normal. No respiratory distress.      Breath sounds: No stridor. Wheezing and rhonchi present. No rales.   Chest:      Chest wall: No tenderness.   Abdominal:      General: Abdomen is flat. There is no distension.      Palpations: Abdomen is soft. There is no mass.      Tenderness: There is no abdominal tenderness.      Hernia: No hernia is present.   Musculoskeletal:      Cervical back: Normal range of motion.   Skin:     General: Skin is warm and dry.   Neurological:      General: No focal deficit present.      Mental Status: He is alert.   Psychiatric:         Mood and Affect: Mood normal.         Behavior: Behavior normal.             Result Review    Result Review:  I have personally reviewed the results from the time of this admission to 10/21/2023 11:37 EDT and agree with these findings:  [x]  Laboratory  []  Microbiology  [x]  Radiology  []  EKG/Telemetry   []  Cardiology/Vascular   []  Pathology  []  Old records  []  Other:            Assessment & Plan      Brief Patient Summary:   Romero ALMAGUER  Raheem is a 58 y.o. male with past medical history smoking 2 pack/day smoker for the past 30 years who presented to [facility] on 10/20/2023 complaining of shortness of breath, cough, decreased appetite and abnormal chest xray. He additionally reports 20 lbs unintentional weight  loss over the past 6 weeks, and his voice being hoarse over the past couple days. He was seen yesterday by pcp who ordered a chest xray he had completed at [facility] with radiology report with him that looks like a right upper lobe mass versus infiltrate and was advised to come to the ED for further evaluation. He denies fever, or chest pain, nausea, vomiting, diarrhea, or urinary complaints.      In the ED, CT of chest interpreted by radiologist shows a very large right upper lobe mass which extends into the right hilum and mediastinum with gross mediastinal adenopathy. Findings are compatible with malignancy. Infiltrates at the periphery of the lesion are compatible with postobstructive infiltrates.       azithromycin, 500 mg, Intravenous, Q24H  cefTRIAXone, 1,000 mg, Intravenous, Q24H  doxepin, 75 mg, Oral, Nightly  guaiFENesin, 1,200 mg, Oral, BID  ipratropium-albuterol, 3 mL, Nebulization, Q4H - RT  mirtazapine, 45 mg, Oral, Nightly  nicotine, 1 patch, Transdermal, Q24H  senna-docusate sodium, 2 tablet, Oral, BID  sodium chloride, 10 mL, Intravenous, Q12H             Active Hospital Problems:  Active Hospital Problems    Diagnosis     **Lung mass      Plan:   Lung mass  CT report reviewed.  Pulmonary and oncology already consulted.  Biopsy for further diagnosis and staging and management.    Pneumonia  Postobstructive pneumonia.  Continue IV antibiotics.    COPD/tobacco abuse  Bronchodilator therapy.  Nicotine patch advised patient to quit.    Electrolyte imbalance.  Monitor electrolytes.  Follow-up BMP    Patient and brother at the bedside was updated with plan of care.  All questions answered    DVT  prophylaxis:  Mechanical DVT prophylaxis orders are present.    CODE STATUS:    Code Status (Patient has no pulse and is not breathing): CPR (Attempt to Resuscitate)  Medical Interventions (Patient has pulse or is breathing): Full Support      Disposition:  I expect patient to be discharged 2 to 3 days          Electronically signed by Jeovanny Griggs MD, 10/21/23, 11:37 EDT.  [facility] Hospitalist Team             Electronically signed by Jeovanny Griggs MD at 10/21/23 1141          Consult Notes (all)        Jeanette Rubio MD at 10/21/23 1628        Consult Orders    1. Hematology & Oncology Inpatient Consult [[id number]] ordered by Bernice Espino APRN at 10/20/23 1800                         Hematology/Oncology Inpatient Consultation    Patient name: Romero Maciel  : 1965  MRN: [id number]  Referring Provider: Dr. Griggs  Reason for Consultation: lung mass    Chief complaint:     History of present illness:    Romero Maciel is a 58 y.o. male who presented to [facility] on 10/20/2023 with complaints of cough, abnormal chest xray.    Patient is a 58-year-old male with history of 60 pack year smoking who has been complaining of shortness of breath, cough weight loss of over 20 pounds over the last few months.  He also started to have hoarseness of voice.  He was seen by PCP and a chest x-ray was ordered which was abnormal patient was advised to come to the ER    10/20/2023 CT chest with very large right upper lobe mass which extends into the right hilum and mediastinum with gross mediastinal adenopathy findings compatible with malignancy.  Postobstructive infiltrates.  Minimal right pleural effusion    10/21/23  Hematology/Oncology was consulted     He/She  has no past medical history on file.    PCP: Jennifer Maynard APRN    History:  History reviewed. No pertinent past medical history., History reviewed. No pertinent surgical history., History reviewed. No pertinent family history.,   Social History  "    Tobacco Use    Smoking status: Every Day     Packs/day: 1.00     Years: 15.00     Additional pack years: 0.00     Total pack years: 15.00     Types: Cigarettes     Passive exposure: Current    Smokeless tobacco: Current   Vaping Use    Vaping Use: Never used   Substance Use Topics    Alcohol use: Never    Drug use: Never   , (Not in a hospital admission)  , Scheduled Meds:  cefTRIAXone, 1,000 mg, Intravenous, Q24H  doxepin, 75 mg, Oral, Nightly  guaiFENesin, 1,200 mg, Oral, BID  ipratropium-albuterol, 3 mL, Nebulization, Q4H - RT  LORazepam, 1 mg, Oral, Once  mirtazapine, 45 mg, Oral, Nightly  nicotine, 1 patch, Transdermal, Q24H  potassium chloride ER, 40 mEq, Oral, Q4H  senna-docusate sodium, 2 tablet, Oral, BID  sodium chloride, 10 mL, Intravenous, Q12H    , Continuous Infusions:   , PRN Meds:    acetaminophen **OR** acetaminophen **OR** acetaminophen    aluminum-magnesium hydroxide-simethicone    senna-docusate sodium **AND** polyethylene glycol **AND** bisacodyl **AND** bisacodyl    Calcium Replacement - Follow Nurse / BPA Driven Protocol    clonazePAM    Magnesium Standard Dose Replacement - Follow Nurse / BPA Driven Protocol    melatonin    ondansetron **OR** ondansetron    Phosphorus Replacement - Follow Nurse / BPA Driven Protocol    Potassium Replacement - Follow Nurse / BPA Driven Protocol    [COMPLETED] Insert Peripheral IV **AND** sodium chloride    sodium chloride    sodium chloride   Allergies:  Penicillins    Subjective     ROS:  Review of Systems     Objective   Vital Signs:   /63 (BP Location: Right arm, Patient Position: Lying)   Pulse 94   Temp 97.7 °F (36.5 °C) (Oral)   Resp 18   Ht 175.3 cm (69\")   Wt 65.7 kg (144 lb 13.5 oz)   SpO2 97%   BMI 21.39 kg/m²     Physical Exam: (performed by MD)  Physical Exam    Results Review:  Lab Results (last 48 hours)       Procedure Component Value Units Date/Time    Blood Culture - Blood, Arm, Right [[id number]]  (Normal) Collected: 10/20/23 " 1536    Specimen: Blood from Arm, Right Updated: 10/21/23 1546     Blood Culture No growth at 24 hours    Blood Culture - Blood, Arm, Left [[id number]]  (Normal) Collected: 10/20/23 1459    Specimen: Blood from Arm, Left Updated: 10/21/23 1516     Blood Culture No growth at 24 hours    Basic Metabolic Panel [[id number]]  (Abnormal) Collected: 10/21/23 1212    Specimen: Blood Updated: 10/21/23 1245     Glucose 89 mg/dL      BUN 6 mg/dL      Creatinine 0.95 mg/dL      Sodium 135 mmol/L      Potassium 3.5 mmol/L      Chloride 100 mmol/L      CO2 23.0 mmol/L      Calcium 8.7 mg/dL      BUN/Creatinine Ratio 6.3     Anion Gap 12.0 mmol/L      eGFR 92.2 mL/min/1.73     Narrative:      GFR Normal >60  Chronic Kidney Disease <60  Kidney Failure <15      CBC Auto Differential [[id number]]  (Abnormal) Collected: 10/21/23 1212    Specimen: Blood Updated: 10/21/23 1222     WBC 5.30 10*3/mm3      RBC 3.44 10*6/mm3      Hemoglobin 9.7 g/dL      Hematocrit 29.1 %      MCV 84.5 fL      MCH 28.1 pg      MCHC 33.3 g/dL      RDW 14.9 %      RDW-SD 45.9 fl      MPV 7.2 fL      Platelets 267 10*3/mm3      Neutrophil % 74.3 %      Lymphocyte % 18.8 %      Monocyte % 4.2 %      Eosinophil % 1.8 %      Basophil % 0.9 %      Neutrophils, Absolute 3.90 10*3/mm3      Lymphocytes, Absolute 1.00 10*3/mm3      Monocytes, Absolute 0.20 10*3/mm3      Eosinophils, Absolute 0.10 10*3/mm3      Basophils, Absolute 0.00 10*3/mm3      nRBC 0.1 /100 WBC     Vitamin B12 [[id number]] Collected: 10/21/23 1212    Specimen: Blood Updated: 10/21/23 1217    Folate [[id number]] Collected: 10/21/23 1212    Specimen: Blood Updated: 10/21/23 1217    Respiratory Panel PCR w/COVID-19(SARS-CoV-2) KEESHA/CHAZ/JOSE/PAD/COR/MAD/PEDRO In-House, NP Swab in UTM/VTM, 3-4 HR TAT - Swab, Nasopharynx [[id number]]  (Normal) Collected: 10/20/23 1951    Specimen: Swab from Nasopharynx Updated: 10/20/23 2057     ADENOVIRUS, PCR Not Detected     Coronavirus 229E Not Detected     Coronavirus HKU1  Not Detected     Coronavirus NL63 Not Detected     Coronavirus OC43 Not Detected     COVID19 Not Detected     Human Metapneumovirus Not Detected     Human Rhinovirus/Enterovirus Not Detected     Influenza A PCR Not Detected     Influenza B PCR Not Detected     Parainfluenza Virus 1 Not Detected     Parainfluenza Virus 2 Not Detected     Parainfluenza Virus 3 Not Detected     Parainfluenza Virus 4 Not Detected     RSV, PCR Not Detected     Bordetella pertussis pcr Not Detected     Bordetella parapertussis PCR Not Detected     Chlamydophila pneumoniae PCR Not Detected     Mycoplasma pneumo by PCR Not Detected    Narrative:      In the setting of a positive respiratory panel with a viral infection PLUS a negative procalcitonin without other underlying concern for bacterial infection, consider observing off antibiotics or discontinuation of antibiotics and continue supportive care. If the respiratory panel is positive for atypical bacterial infection (Bordetella pertussis, Chlamydophila pneumoniae, or Mycoplasma pneumoniae), consider antibiotic de-escalation to target atypical bacterial infection.    Ferritin [[id number]]  (Abnormal) Collected: 10/20/23 1459    Specimen: Blood Updated: 10/20/23 2018     Ferritin 546.30 ng/mL     Narrative:      Results may be falsely decreased if patient taking Biotin.      Iron [[id number]]  (Abnormal) Collected: 10/20/23 1459    Specimen: Blood Updated: 10/20/23 2011     Iron 37 mcg/dL     POC Lactate [[id number]]  (Normal) Collected: 10/20/23 1638    Specimen: Blood Updated: 10/20/23 1640     Lactate 2.0 mmol/L      Comment: Serial Number: [id number]:  [id number]       Procalcitonin [[id number]]  (Abnormal) Collected: 10/20/23 1459    Specimen: Blood Updated: 10/20/23 1533     Procalcitonin 0.27 ng/mL     Narrative:      As a Marker for Sepsis (Non-Neonates):    1. <0.5 ng/mL represents a low risk of severe sepsis and/or septic shock.  2. >2 ng/mL represents a high risk of severe  "sepsis and/or septic shock.    As a Marker for Lower Respiratory Tract Infections that require antibiotic therapy:    PCT on Admission    Antibiotic Therapy       6-12 Hrs later    >0.5                Strongly Recommended  >0.25 - <0.5        Recommended   0.1 - 0.25          Discouraged              Remeasure/reassess PCT  <0.1                Strongly Discouraged     Remeasure/reassess PCT    As 28 day mortality risk marker: \"Change in Procalcitonin Result\" (>80% or <=80%) if Day 0 (or Day 1) and Day 4 values are available. Refer to [email or web address]    Change in PCT <=80%  A decrease of PCT levels below or equal to 80% defines a positive change in PCT test result representing a higher risk for 28-day all-cause mortality of patients diagnosed with severe sepsis for septic shock.    Change in PCT >80%  A decrease of PCT levels of more than 80% defines a negative change in PCT result representing a lower risk for 28-day all-cause mortality of patients diagnosed with severe sepsis or septic shock.       Comprehensive Metabolic Panel [[id number]]  (Abnormal) Collected: 10/20/23 1459    Specimen: Blood Updated: 10/20/23 1526     Glucose 108 mg/dL      BUN 8 mg/dL      Creatinine 1.13 mg/dL      Sodium 132 mmol/L      Potassium 3.4 mmol/L      Chloride 93 mmol/L      CO2 24.0 mmol/L      Calcium 9.6 mg/dL      Total Protein 7.7 g/dL      Albumin 3.6 g/dL      ALT (SGPT) 25 U/L      AST (SGOT) 49 U/L      Alkaline Phosphatase 158 U/L      Total Bilirubin 0.4 mg/dL      Globulin 4.1 gm/dL      A/G Ratio 0.9 g/dL      BUN/Creatinine Ratio 7.1     Anion Gap 15.0 mmol/L      eGFR 74.9 mL/min/1.73     Narrative:      GFR Normal >60  Chronic Kidney Disease <60  Kidney Failure <15      CBC & Differential [[id number]]  (Abnormal) Collected: 10/20/23 1459    Specimen: Blood Updated: 10/20/23 1507    Narrative:      The following orders were created for panel order CBC & Differential.  Procedure                     "           Abnormality         Status                     ---------                               -----------         ------                     CBC Auto Differential[[id number]]        Abnormal            Final result                 Please view results for these tests on the individual orders.    CBC Auto Differential [[id number]]  (Abnormal) Collected: 10/20/23 1459    Specimen: Blood Updated: 10/20/23 1507     WBC 8.00 10*3/mm3      RBC 3.97 10*6/mm3      Hemoglobin 11.4 g/dL      Hematocrit 33.6 %      MCV 84.5 fL      MCH 28.8 pg      MCHC 34.1 g/dL      RDW 14.7 %      RDW-SD 45.9 fl      MPV 7.5 fL      Platelets 272 10*3/mm3      Neutrophil % 83.2 %      Lymphocyte % 11.9 %      Monocyte % 3.2 %      Eosinophil % 0.6 %      Basophil % 1.1 %      Neutrophils, Absolute 6.70 10*3/mm3      Lymphocytes, Absolute 1.00 10*3/mm3      Monocytes, Absolute 0.30 10*3/mm3      Eosinophils, Absolute 0.10 10*3/mm3      Basophils, Absolute 0.10 10*3/mm3      nRBC 0.2 /100 WBC              Imaging Reviewed:   CT Chest With Contrast Diagnostic    Result Date: 10/20/2023  Impression: Very large right upper lobe mass which extends into the right hilum and mediastinum with gross mediastinal adenopathy. Findings are compatible with malignancy. Infiltrates at the periphery of the lesion are compatible with postobstructive infiltrates. Minimal right pleural effusion. Electronically Signed: Anne-Marie Koroma MD  10/20/2023 4:10 PM EDT  Workstation ID: [id number]         Assessment & Plan     Patient is a 58-year-old male with chronic smoking history who presents with cough, shortness of breath CT imaging concerning for a large right upper lobe mass mediastinal adenopathy    Lung mass  Large right upper lobe lung mass with infiltrate in the right hilum  Significantly enlarged mediastinal adenopathy likely lung cancer with metastasis to the lymph node  Pulmonary has been consulted plan for bronchoscopy  We will send biopsy sample for NGS,  PD-L1 testing if non-small cell lung cancer  Await bronchoscopy and biopsy results  Complete CT abdomen pelvis to complete staging, will get MRI brain as well.     Anemia  Normocytic anemia  Iron level is low however ferritin is high which could be an acute phase reactant  Monitor CBC obtain complete iron profile including iron saturation B12 and folate levels    Electronically signed by Jeanette Rubio MD, 10/21/23, 4:28 PM EDT.        Thank you for this consult. We will be happy to follow along with you.           Electronically signed by Jeanette Rubio MD at 10/21/23 1646       Genia Jenkins MD at 10/21/23 1440        Consult Orders    1. Inpatient Pulmonology Consult [[id number]] ordered by Bernice Espino APRN at 10/20/23 1800                 Group: [facility]         CONSULT NOTE    Patient Identification:  oRmero Maciel  58 y.o.  male  1965  [id number]            Requesting physician: Attending physician    Reason for Consultation: Right upper lobe mass      History of Present Illness:  58-year-old male with history of smoking 2 packs a day for the last 30 years and anxiety disorder who presented 10/20/2023 with complaints of progressive shortness of breath, cough, decreased appetite and progressive weight loss of around 20 pounds in the last 6 weeks, hoarseness of voice.  Chest x-ray done in [facility] was reported to have right upper lobe mass.  CT scan in ER showed large right upper lobe mass extending to right hilum and mediastinum with mediastinal lymphadenopathy.  Patient is afebrile, hemodynamically stable, oxygenating well on room air.  WBCs 5.3, hemoglobin initially 11.4 and repeated today 9.7    Assessment:  Lung mass: Right upper lobe infiltrate in right hilum with enlarged mediastinal lymphadenopathy compatible with bronchogenic malignancy  Postobstructive pneumonia  Anemia  Tobacco smoking  History of anxiety and depression      Recommendations:  Schedule  "bronchoscopy 10/22/2023  Oxygen supplement and titration to maintain saturation 90 to 95%  Bronchodilators  Antibiotics  Mucinex  Smoking cessation counseling, nicotine patch            Review of Sytems:  Constitutional: Negative for chills, fever and positive for weight loss and malaise/fatigue.   HENT: Negative.    Eyes: Negative.    Cardiovascular: Negative.    Respiratory: Positive for cough and shortness of breath.    Skin: Negative.    Musculoskeletal: Negative.    Gastrointestinal: Positive for decreased p.o. intake  Genitourinary: Negative.    Neurological: Negative.    Psychiatric/Behavioral: Negative.    Past Medical History:  History reviewed. No pertinent past medical history.    Past Surgical History:  History reviewed. No pertinent surgical history.     Home Meds:  (Not in a hospital admission)      Allergies:  Allergies   Allergen Reactions    Penicillins Anaphylaxis       Social History:   Social History     Socioeconomic History    Marital status: Single   Tobacco Use    Smoking status: Every Day     Packs/day: 1.00     Years: 15.00     Additional pack years: 0.00     Total pack years: 15.00     Types: Cigarettes     Passive exposure: Current    Smokeless tobacco: Current   Vaping Use    Vaping Use: Never used   Substance and Sexual Activity    Alcohol use: Never    Drug use: Never    Sexual activity: Defer       Family History:  History reviewed. No pertinent family history.    Physical Exam:  /63 (BP Location: Right arm, Patient Position: Lying)   Pulse 99   Temp 97.7 °F (36.5 °C) (Oral)   Resp 19   Ht 175.3 cm (69\")   Wt 65.7 kg (144 lb 13.5 oz)   SpO2 96%   BMI 21.39 kg/m²  Body mass index is 21.39 kg/m². 96% 65.7 kg (144 lb 13.5 oz)  General Appearance:  Alert   HEENT:  Normocephalic, without obvious abnormality, Conjunctiva/corneas clear,.   Nares normal, no drainage     Neck:  Supple, symmetrical, trachea midline. No JVD.  Lungs /Chest wall:   Right-sided rhonchi, respirations " "unlabored, symmetrical wall movement.     Heart:  Regular rate and rhythm, S1 S2 normal  Abdomen: Soft, non-tender, no masses, no organomegaly.    Extremities: No edema, no clubbing or cyanosis    LABS:  Lab Results   Component Value Date    CALCIUM 8.7 10/21/2023     Results from last 7 days   Lab Units 10/21/23  1212 10/20/23  1459   SODIUM mmol/L 135* 132*   POTASSIUM mmol/L 3.5 3.4*   CHLORIDE mmol/L 100 93*   CO2 mmol/L 23.0 24.0   BUN mg/dL 6 8   CREATININE mg/dL 0.95 1.13   GLUCOSE mg/dL 89 108*   CALCIUM mg/dL 8.7 9.6   WBC 10*3/mm3 5.30 8.00   HEMOGLOBIN g/dL 9.7* 11.4*   PLATELETS 10*3/mm3 267 272   ALT (SGPT) U/L  --  25   AST (SGOT) U/L  --  49*   PROCALCITONIN ng/mL  --  0.27*     No results found for: \"CKTOTAL\", \"CKMB\", \"CKMBINDEX\", \"TROPONINI\", \"TROPONINT\"          Results from last 7 days   Lab Units 10/20/23  1638 10/20/23  1459   PROCALCITONIN ng/mL  --  0.27*   LACTATE mmol/L 2.0  --          Results from last 7 days   Lab Units 10/20/23  1951   ADENOVIRUS DETECTION BY PCR  Not Detected   CORONAVIRUS 229E  Not Detected   CORONAVIRUS HKU1  Not Detected   CORONAVIRUS NL63  Not Detected   CORONAVIRUS OC43  Not Detected   HUMAN METAPNEUMOVIRUS  Not Detected   HUMAN RHINOVIRUS/ENTEROVIRUS  Not Detected   INFLUENZA B PCR  Not Detected   PARAINFLUENZA 1  Not Detected   PARAINFLUENZA VIRUS 2  Not Detected   PARAINFLUENZA VIRUS 3  Not Detected   PARAINFLUENZA VIRUS 4  Not Detected   BORDETELLA PERTUSSIS PCR  Not Detected   CHLAMYDOPHILA PNEUMONIAE PCR  Not Detected   MYCOPLAMA PNEUMO PCR  Not Detected   INFLUENZA A PCR  Not Detected   RSV, PCR  Not Detected             No results found for: \"TSH\"  Estimated Creatinine Clearance: 77.8 mL/min (by C-G formula based on SCr of 0.95 mg/dL).         Imaging:  Imaging Results (Last 24 Hours)       Procedure Component Value Units Date/Time    CT Chest With Contrast Diagnostic [[id number]] Collected: 10/20/23 1603     Updated: 10/20/23 1612    Narrative:      CT " CHEST W CONTRAST DIAGNOSTIC    Date of Exam: 10/20/2023 3:40 PM EDT    Indication: abn xr at [facility] yesterday; hx smoking; 20# weight loss.    Comparison: None available.    Technique: Axial CT images were obtained of the chest after the uneventful intravenous administration of iodinated contrast.  Sagittal and coronal reconstructions were performed.  Automated exposure control and iterative reconstruction methods were used.      Findings:  There is a very large mass occupying the right upper lobe posteriorly and laterally. There is infiltrate at the periphery of the mass. The mass extends into the right hilum and mediastinum with gross hilar or mediastinal adenopathy which is confluent   with the mass. The right pulmonary artery is compressed and narrowed by the adenopathy and mass. The bronchial tree to the right upper lobe is obliterated. There is severe narrowing of the bronchus intermedius and bronchial branches to the right middle   and right lower lobes. Secondary to the coalescent nature of the mass with the right hilum and the mediastinum and adjacent infiltrate, exact measurement is difficult. The right paramediastinal component which compresses the trachea measures   approximately 5.7 x 4.4 cm as measured on image #40 of series 2. The component within the right upper lobe posteriorly extending into the right hilum and posterior mediastinum measures approximately 11 x 7 cm as measured on image #40 of series #2, not   including some peripheral infiltrate. There is additional bulky adenopathy in the thoracic inlet. There is no left hilar adenopathy. There is a minimal right pleural effusion. There is no left pleural effusion. There are no adrenal masses. There are   cholecystectomy clips. A large cyst is noted in the right hepatic lobe. There is a small pericardial effusion. There are no nodules or masses in the left lung. There is mild posterior atelectasis of the right lung base.      Impression:       Impression:  Very large right upper lobe mass which extends into the right hilum and mediastinum with gross mediastinal adenopathy. Findings are compatible with malignancy. Infiltrates at the periphery of the lesion are compatible with postobstructive infiltrates.    Minimal right pleural effusion.        Electronically Signed: Anne-Marie Koroma MD    10/20/2023 4:10 PM EDT    Workstation ID: [id number]              Current Meds:   SCHEDULE  azithromycin, 500 mg, Intravenous, Q24H  cefTRIAXone, 1,000 mg, Intravenous, Q24H  doxepin, 75 mg, Oral, Nightly  guaiFENesin, 1,200 mg, Oral, BID  ipratropium-albuterol, 3 mL, Nebulization, Q4H - RT  mirtazapine, 45 mg, Oral, Nightly  nicotine, 1 patch, Transdermal, Q24H  potassium chloride ER, 40 mEq, Oral, Q4H  senna-docusate sodium, 2 tablet, Oral, BID  sodium chloride, 10 mL, Intravenous, Q12H      Infusions     PRNs    acetaminophen **OR** acetaminophen **OR** acetaminophen    aluminum-magnesium hydroxide-simethicone    senna-docusate sodium **AND** polyethylene glycol **AND** bisacodyl **AND** bisacodyl    Calcium Replacement - Follow Nurse / BPA Driven Protocol    clonazePAM    Magnesium Standard Dose Replacement - Follow Nurse / BPA Driven Protocol    melatonin    ondansetron **OR** ondansetron    Phosphorus Replacement - Follow Nurse / BPA Driven Protocol    Potassium Replacement - Follow Nurse / BPA Driven Protocol    [COMPLETED] Insert Peripheral IV **AND** sodium chloride    sodium chloride    sodium chloride        Genia Jenkins MD  10/21/2023  14:40 EDT      Much of this encounter note is an electronic transcription/translation of spoken language to printed text using Dragon Software.      Electronically signed by Genia Jenkins MD at 10/21/23 5493

## 2023-10-24 ENCOUNTER — CONSULT (OUTPATIENT)
Dept: RADIATION ONCOLOGY | Facility: HOSPITAL | Age: 58
End: 2023-10-24
Payer: COMMERCIAL

## 2023-10-24 VITALS
OXYGEN SATURATION: 92 % | SYSTOLIC BLOOD PRESSURE: 122 MMHG | DIASTOLIC BLOOD PRESSURE: 76 MMHG | RESPIRATION RATE: 20 BRPM | HEART RATE: 103 BPM | WEIGHT: 144.4 LBS | BODY MASS INDEX: 21.32 KG/M2

## 2023-10-24 DIAGNOSIS — C79.31 METASTASIS TO BRAIN: Primary | ICD-10-CM

## 2023-10-24 DIAGNOSIS — G93.9 BRAIN LESION: ICD-10-CM

## 2023-10-24 DIAGNOSIS — R91.8 LUNG MASS: Primary | ICD-10-CM

## 2023-10-24 PROBLEM — M24.469 RECURRENT DISLOCATION OF KNEE: Status: RESOLVED | Noted: 2023-10-24 | Resolved: 2023-10-24

## 2023-10-24 PROBLEM — F41.0 EPISODIC PAROXYSMAL ANXIETY DISORDER: Status: ACTIVE | Noted: 2023-10-24

## 2023-10-24 LAB
LAB AP CASE REPORT: NORMAL
LAB AP DIAGNOSIS COMMENT: NORMAL
LAB AP DIAGNOSIS COMMENT: NORMAL
PATH REPORT.FINAL DX SPEC: NORMAL
PATH REPORT.GROSS SPEC: NORMAL

## 2023-10-24 RX ORDER — LORAZEPAM 1 MG/1
1 TABLET ORAL DAILY
Qty: 4 TABLET | Refills: 0 | Status: SHIPPED | OUTPATIENT
Start: 2023-10-24

## 2023-10-24 NOTE — PROGRESS NOTES
RADIATION THERAPY CONSULT NOTE    NAME: Romero Maciel  YOB: 1965  MRN #: [id number]  DATE OF SERVICE: 10/24/2023  REFERRING PROVIDER: Jeanette Rubio MD  PRIMARY CARE PROVIDER: Jennifer Maynard APRN    DIAGNOSIS:  stage IV Lung Cancer  Encounter Diagnosis   Name Primary?    Metastasis to brain Yes     REASON FOR CONSULTATION/CHIEF COMPLAINT:  Brain met, new lung cancer  I was asked to see the patient at the request of the referring provider noted below for advice and recommendations regarding this diagnosis and the role of radiation therapy.                              REQUESTING PHYSICIAN:  Jenniefr Concepcion Aprn  [address] [place] [address]    RECORDS OBTAINED:  Records of the patients history including those obtained from the referring provider were reviewed and summarized in detail.    HISTORY OF PRESENT ILLNESS:  Romero Maciel is a 58 y.o. male who presented to [facility] on 10/20/2023 with complaints of cough, abnormal chest xray.  He has a history of 60 pack year smoking who has been complaining of shortness of breath, cough weight loss of over 20 pounds over the last few months.  He also started to have hoarseness of voice.  He was seen by PCP and a chest x-ray was ordered which was abnormal patient was advised to come to the ER.     10/20/2023 CT chest with very large right upper lobe mass which extends into the right hilum and mediastinum with gross mediastinal adenopathy findings compatible with malignancy.  Postobstructive infiltrates.  Minimal right pleural effusion    He saw Dr. Rubio and MRI brain was ordered, biopsy ordered (with plans for NGS, PD-L1) and outpatient PET ordered.    Today I reviewed these studies independently and discussed with him:  CT chest 10/20/23-- very large mass occupying the right upper lobe posteriorly and laterally. There is infiltrate at the periphery of the mass. The mass extends into the right hilum and mediastinum with gross  hilar or mediastinal adenopathy which is confluent with the mass. The right pulmonary artery is compressed and narrowed by the adenopathy and mass. The bronchial tree to the right upper lobe is obliterated. There is severe narrowing of the bronchus intermedius and bronchial branches to the right middle and right lower lobes.   -Bulky adenopathy is also appreciated in the thoracic inlet, there is no left hilar adenopathy.  CT abd/pelvis 10/21/2023-- No acute process nor metastatic disease is identified within the abdomen or pelvis.    -MRI brain 10/22/203--A 1.7 cm peripherally enhancing mass within the right occipital lobe is noted for brain mets, with mild surrounding edema.  This study was not 1 mm slice SRS protocol so more dedicated SRS protocol brain MRI is needed for SRS planning.    Dr. Jenkins did the bronch with biopsy on 10/22/2023---Findings are consistent with endobronchial mass obstructing 80% of the right mainstem with the right upper lobe bronchus still patent 20% and the right lower lobe patent around 10%.  Transbronchial FNA with a size 19-gauge needle was performed in 2 sites, and right hilar area x2, right paratracheal area x1. Endobronchial cryobiopsy was performed in the right mainstem mass x5, the mild bleeding was controlled with instilling cold saline.    He denies any CNS symptoms.    He does have a lot of trouble laying flat and is concerned about that with treatment and also has anxiety.    The following portions of the patient's history were reviewed and updated as appropriate: allergies, current medications, past family history, past medical history, past social history, past surgical history and problem list. Reviewed with the patient and remain unchanged.    PAST MEDICAL HISTORY:  he has a past medical history of Recurrent dislocation of knee (10/24/2023).    MEDICATIONS:    Current Outpatient Medications:     benzonatate (TESSALON) 200 MG capsule, Take 1 capsule by mouth 2 (Two) Times a  Day As Needed for Cough., Disp: , Rfl:     budesonide (PULMICORT) 0.5 MG/2ML nebulizer solution, Take 2 mL by nebulization 2 (Two) Times a Day., Disp: 120 mL, Rfl: 0    clonazePAM (KlonoPIN) 1 MG tablet, Take 1 tablet by mouth 2 (Two) Times a Day As Needed., Disp: , Rfl:     dexAMETHasone (DECADRON) 6 MG tablet, Take 1 tablet by mouth Daily With Breakfast., Disp: 7 tablet, Rfl: 0    doxepin (SINEquan) 75 MG capsule, Take 1 capsule by mouth Every Night., Disp: , Rfl:     guaiFENesin (MUCINEX) 600 MG 12 hr tablet, Take 2 tablets by mouth 2 (Two) Times a Day., Disp: , Rfl:     ipratropium-albuterol (DUO-NEB) 0.5-2.5 mg/3 ml nebulizer, Take 3 mL by nebulization Every 4 (Four) Hours As Needed for Wheezing., Disp: 180 mL, Rfl: 0    mirtazapine (REMERON) 45 MG tablet, Take 1 tablet by mouth Every Night., Disp: , Rfl:     nicotine (NICODERM CQ) 21 MG/24HR patch, Place 1 patch on the skin as directed by provider Daily., Disp: 28 each, Rfl: 0  No current facility-administered medications for this visit.    ALLERGIES:    Allergies   Allergen Reactions    Penicillins Anaphylaxis     PAST SURGICAL HISTORY:  he has a past surgical history that includes Bronchoscopy (N/A, 10/22/2023).    PREVIOUS RADIOTHERAPY OR CHEMOTHERAPY:  no    FAMILY HISTORY:  non-contributory    SOCIAL HISTORY:  he reports that he has been smoking cigarettes. He has a 15.00 pack-year smoking history. He has been exposed to tobacco smoke. He uses smokeless tobacco. He reports that he does not drink alcohol and does not use drugs.    PAIN AND PAIN MANAGEMENT:    Vitals:    10/24/23 1519   BP: 122/76   Pulse: 103   Resp: 20   SpO2: 92%   Weight: 65.5 kg (144 lb 6.4 oz)   PainSc: 0-No pain     NUTRITIONAL STATUS: No issues  KPS:  80:  Normal activity with effort; some signs or symptoms    PHQ-9 Total Score: distress screening tool completed.    REVIEW OF SYSTEMS:   Review of Systems   General: No fevers, chills, weight change, or drenching night sweats. Skin:  No rashes or jaundice.  HEENT: No change in vision or hearing, no headaches.  Neck: No dysphagia or masses.  Heme/Lymph: No easy bruising or bleeding.  Respiratory System: as noted above, no hemoptysis.  Cardiovascular: No chest pain, palpitations, or dyspnea on exertion.  - Pacemaker. GI: No nausea, vomiting, diarrhea, melena, or hematochezia.  : No dysuria or hematuria.  Endocrine: No heat or cold intolerance. Musculoskeletal: No myalgias or arthralgias.  Neuro: No weakness, numbness, syncope, or seizures. Psych: No mood changes or depression. Ext: Denies swelling.      Objective   VITAL SIGNS:  Vitals:    10/24/23 1519   BP: 122/76   Pulse: 103   Resp: 20   SpO2: 92%   Weight: 65.5 kg (144 lb 6.4 oz)   PainSc: 0-No pain     PHYSICAL EXAM:  GENERAL:  No apparent distress. Sitting comfortably in room.    HEENT:  Normocephalic, atraumatic. Pupils are equal, round, reactive to light. Sclera anicteric. Conjunctiva not injected. Oropharynx without erythema, ulcerations or thrush.   NECK:  Supple with no masses.  LYMPHATIC:  No cervical, supraclavicular or axillary adenopathy appreciated bilaterally.   CARDIOVASCULAR:  S1 & S2 detected; no murmurs, rubs or gallops.  CHEST:  Clear to auscultation bilaterally; no wheezes, crackles or rubs. Work of breathing normal.  ABDOMEN:  Bowel sounds present. Abdomen is soft, nontender, nondistended.   MUSCULOSKELETAL:  No tenderness to palpation along the spine or scapulae. Normal range of motion.  EXTREMITIES:  No clubbing, cyanosis, edema.  SKIN:  No erythema, rashes, ulcerations noted.   NEUROLOGIC:  Cranial nerves II-XII grossly intact bilaterally. No focal neurologic deficits.  PSYCHIATRIC:  Alert, aware, and appropriate.      PERTINENT IMAGING/PATHOLOGY/LABS (Medical Decision Making):      COORDINATION OF CARE:  A copy of this note is sent to the referring provider.    PATHOLOGY (Reviewed): pending; called pathology office to discuss at beginning of consult.    IMAGING  (Reviewed): MRI brain (was scheduling 1mm SRS protocol for ASA)    LABS (Reviewed):  HEMATOLOGY:  WBC   Date Value Ref Range Status   10/22/2023 5.30 3.40 - 10.80 10*3/mm3 Final     RBC   Date Value Ref Range Status   10/22/2023 3.50 (L) 4.14 - 5.80 10*6/mm3 Final     Hemoglobin   Date Value Ref Range Status   10/22/2023 10.1 (L) 13.0 - 17.7 g/dL Final     Hematocrit   Date Value Ref Range Status   10/22/2023 29.5 (L) 37.5 - 51.0 % Final     Platelets   Date Value Ref Range Status   10/22/2023 299 140 - 450 10*3/mm3 Final     CHEMISTRY:  Lab Results   Component Value Date    GLUCOSE 94 10/22/2023    BUN 4 (L) 10/22/2023    CREATININE 0.86 10/22/2023    BCR 4.7 (L) 10/22/2023    K 4.7 10/22/2023    CO2 22.0 10/22/2023    CALCIUM 8.5 (L) 10/22/2023    ALBUMIN 3.6 10/20/2023    AST 49 (H) 10/20/2023    ALT 25 10/20/2023     Assessment & Plan   ASSESSMENT AND PLAN:    1. Metastasis to brain       Single lesion in brain--needs 1 mm post neema T1 SRS protocol study  -Discussed with Dr. Rubio that SRS would be before potential chemo/chemoimmunotherapy based on final pathology as long as not SCLC as chemo and chemoimmunotherapy could potentially just start as no swelling or CNS symptoms with short interval MRI brain to confirm area responding.    CT sim ordered for encompass mask      This assessment comes from my review of the imaging, pathology, physician notes and other pertinent information as mentioned.    DISPOSITION:  getting MRI brain SRS protocol.    TIME SPENT WITH PATIENT: I spent 60 minutes caring for Romero on this date of service. This time includes time spent by me in the following activities: preparing for the visit, reviewing tests, obtaining and/or reviewing a separately obtained history, performing a medically appropriate examination and/or evaluation, counseling and educating the patient/family/caregiver, ordering medications, tests, or procedures, referring and communicating with other health care  professionals, documenting information in the medical record, independently interpreting results and communicating that information with the patient/family/caregiver, and care coordination           CC: Jennifer Maynard APRN Witt, Shannon, APRN Amitoj Gill, MD          Addendum: Patient was taken to CT sim for positioning.  With CP, SOB and ability to lay head back he was concerned about tolerance for encompass mass.  While working through this, we were alerted that final path was back and he does indeed have   Small cell lung carcinoma     I told him and his family that he now has Extensive stage, Stage IV disease and palliative chemoimmunotherapy could be started right away and brain directed XRT could be done after 3-6 cycles of said therapy.  Discussed with Dr. Rubio again today who agreed completed and is expediting the systemic therapy.    I will have him come back in 10-12 weeks and he will need MRI brain after 2-3 chemos to make sure responding. Patient and family discussed Neuro symptoms to monitor for; again he is asymptomatic now.    Approved by:     Ty Goodrich MD

## 2023-10-25 DIAGNOSIS — C34.90 SMALL CELL LUNG CANCER: Primary | ICD-10-CM

## 2023-10-25 LAB
BACTERIA SPEC AEROBE CULT: NORMAL
BACTERIA SPEC AEROBE CULT: NORMAL

## 2023-10-25 RX ORDER — FAMOTIDINE 10 MG/ML
20 INJECTION, SOLUTION INTRAVENOUS AS NEEDED
OUTPATIENT
Start: 2023-10-25

## 2023-10-25 RX ORDER — SODIUM CHLORIDE 9 MG/ML
20 INJECTION, SOLUTION INTRAVENOUS ONCE
OUTPATIENT
Start: 2023-10-25

## 2023-10-25 RX ORDER — DIPHENHYDRAMINE HYDROCHLORIDE 50 MG/ML
50 INJECTION INTRAMUSCULAR; INTRAVENOUS AS NEEDED
OUTPATIENT
Start: 2023-10-25

## 2023-10-25 RX ORDER — SODIUM CHLORIDE 9 MG/ML
20 INJECTION, SOLUTION INTRAVENOUS ONCE
OUTPATIENT
Start: 2023-10-27

## 2023-10-25 RX ORDER — SODIUM CHLORIDE 9 MG/ML
20 INJECTION, SOLUTION INTRAVENOUS ONCE
OUTPATIENT
Start: 2023-10-26

## 2023-10-25 RX ORDER — PALONOSETRON 0.05 MG/ML
0.25 INJECTION, SOLUTION INTRAVENOUS ONCE
OUTPATIENT
Start: 2023-10-25

## 2023-10-26 ENCOUNTER — HOSPITAL ENCOUNTER (OUTPATIENT)
Dept: PET IMAGING | Facility: HOSPITAL | Age: 58
Discharge: HOME | End: 2023-10-26
Payer: COMMERCIAL

## 2023-10-26 ENCOUNTER — TELEPHONE (OUTPATIENT)
Dept: ONCOLOGY | Facility: CLINIC | Age: 58
End: 2023-10-26
Payer: COMMERCIAL

## 2023-10-26 DIAGNOSIS — R91.8 LUNG MASS: ICD-10-CM

## 2023-10-26 LAB
FUNGUS WND CULT: ABNORMAL
GLUCOSE BLDC GLUCOMTR-MCNC: 86 MG/DL (ref 70–105)
P JIROVECII DNA L RESP QL NAA+NON-PROBE: NEGATIVE
REF LAB TEST METHOD: NORMAL

## 2023-10-26 PROCEDURE — 78815 PET IMAGE W/CT SKULL-THIGH: CPT

## 2023-10-26 PROCEDURE — 82948 REAGENT STRIP/BLOOD GLUCOSE: CPT

## 2023-10-26 PROCEDURE — A9552 F18 FDG: HCPCS | Performed by: INTERNAL MEDICINE

## 2023-10-26 PROCEDURE — 0 FLUDEOXYGLUCOSE F18 SOLUTION: Performed by: INTERNAL MEDICINE

## 2023-10-26 RX ADMIN — FLUDEOXYGLUCOSE F18 1 DOSE: 300 INJECTION INTRAVENOUS at 10:54

## 2023-10-26 NOTE — TELEPHONE ENCOUNTER
Caller: Debbie Maciel    Relationship: Emergency Contact    Best call back number: [phone]    What is the best time to reach you: ASAP    Who are you requesting to speak with (clinical staff, provider,  specific staff member): CLINICAL     What was the call regarding: DEBBIE IS CALLING WANTING TO KNOW WHY IT IS TAKING SO LONG TO GET THE PORT PUT IN.  AND NOT HAVING CHEMO SCHEDULED YET.  DEBBIE IS CONCERNED AND WOULD LIKE A CALL BACK ASAP TO DISCUSS     Is it okay if the provider responds through MyChart: NO

## 2023-10-27 NOTE — TELEPHONE ENCOUNTER
Spoke with Suhas. He states he has his schedule written down and knows he will be getting Chemo Tuesday. He asked about the results of the PET scan and they have not been resulted at this time. I let Suhas know that he will have an appt with Dr. Rubio on Tuesday while he is getting his treatment. He v/u and had no other questions.

## 2023-10-29 LAB
MYCOBACTERIUM SPEC CULT: NORMAL
NIGHT BLUE STAIN TISS: NORMAL

## 2023-10-30 ENCOUNTER — OFFICE VISIT (OUTPATIENT)
Dept: ONCOLOGY | Facility: CLINIC | Age: 58
End: 2023-10-30
Payer: COMMERCIAL

## 2023-10-30 VITALS
HEART RATE: 102 BPM | WEIGHT: 148 LBS | HEIGHT: 69 IN | RESPIRATION RATE: 18 BRPM | DIASTOLIC BLOOD PRESSURE: 75 MMHG | BODY MASS INDEX: 21.92 KG/M2 | SYSTOLIC BLOOD PRESSURE: 121 MMHG | TEMPERATURE: 98.2 F | OXYGEN SATURATION: 97 %

## 2023-10-30 DIAGNOSIS — C34.90 SMALL CELL LUNG CANCER: Primary | ICD-10-CM

## 2023-10-30 NOTE — PROGRESS NOTES
HEMATOLOGY ONCOLOGY OUTPATIENT FOLLOW UP       Patient name: Romero Maciel  : 1965  MRN: [id number]  Primary Care Physician: Jennifer Maynard APRN  Referring Physician: No ref. provider found  Reason For Consult:         History of Present Illness:    Romero Maciel is a 58 y.o. male who presented to [facility] on 10/20/2023 with complaints of cough, abnormal chest xray.     Patient is a 58-year-old male with history of 60 pack year smoking who has been complaining of shortness of breath, cough weight loss of over 20 pounds over the last few months.  He also started to have hoarseness of voice.  He was seen by PCP and a chest x-ray was ordered which was abnormal patient was advised to come to the ER     10/20/2023 CT chest with very large right upper lobe mass which extends into the right hilum and mediastinum with gross mediastinal adenopathy findings compatible with malignancy.  Postobstructive infiltrates.  Minimal right pleural effusion    10/22/2023 -bronchoscopy with endobronchial biopsy of the mass in the right mainstem bronchus biopsy positive for small cell lung cancer    10/22/2023 -MRI brain with 1.7 cm peripherally enhancing mass within the right occipital lobe with mild surrounding edema concerning for brain metastasis additional findings compatible with chronic microvascular ischemic changes.  Patient was started on dexamethasone    10/26/2023 -PET/CT with large right upper lobe hypermetabolic mass extending to the right hilum consistent with malignancy mass encases the right mainstem bronchus and lobar bronchi with areas of cavitating consolidation in the posterior right upper lobe and groundglass opacities in the right upper lobe likely postobstructive pneumonia.  Confluent hypermetabolic mediastinal adenopathy in the right paratracheal, subcarinal and right hilar region consistent with metastatic adenopathy.  Large right paratracheal logan masses mass effect on the SVC.   Hypermetabolic left paratracheal and right supraclavicular metastatic adenopathy.  No hypermetabolic adenopathy in the abdomen or pelvis.  Multiple hypermetabolic osseous lesions consistent with osseous metastasis.    Subjective:  Patient seen for follow-up and to discuss treatment options given PET/CT findings    Past Medical History:   Diagnosis Date    Recurrent dislocation of knee 10/24/2023       Past Surgical History:   Procedure Laterality Date    BRONCHOSCOPY N/A 10/22/2023    Procedure: BRONCHOSCOPY WITH CRYO BIOPSY X1 AREA,  FINE NEEDLE ASPIRATION, AND BRONCHOALVEOLAR LAVAGE;  Surgeon: Genia Jenkins MD;  Location: [facility];  Service: Pulmonary;  Laterality: N/A;  POST: LUNG MASS         Current Outpatient Medications:     budesonide (PULMICORT) 0.5 MG/2ML nebulizer solution, Take 2 mL by nebulization 2 (Two) Times a Day., Disp: 120 mL, Rfl: 0    clonazePAM (KlonoPIN) 1 MG tablet, Take 1 tablet by mouth 2 (Two) Times a Day As Needed., Disp: , Rfl:     dexAMETHasone (DECADRON) 6 MG tablet, Take 1 tablet by mouth Daily With Breakfast., Disp: 7 tablet, Rfl: 0    doxepin (SINEquan) 75 MG capsule, Take 1 capsule by mouth Every Night., Disp: , Rfl:     guaiFENesin (MUCINEX) 600 MG 12 hr tablet, Take 2 tablets by mouth 2 (Two) Times a Day. (Patient not taking: Reported on 10/25/2023), Disp: , Rfl:     ipratropium-albuterol (DUO-NEB) 0.5-2.5 mg/3 ml nebulizer, Take 3 mL by nebulization Every 4 (Four) Hours As Needed for Wheezing., Disp: 180 mL, Rfl: 0    LORazepam (Ativan) 1 MG tablet, Take 1 tablet by mouth Daily. On MRI and Radiation days (Patient not taking: Reported on 10/30/2023), Disp: 4 tablet, Rfl: 0    mirtazapine (REMERON) 45 MG tablet, Take 1 tablet by mouth Every Night., Disp: , Rfl:     nicotine (NICODERM CQ) 21 MG/24HR patch, Place 1 patch on the skin as directed by provider Daily., Disp: 28 each, Rfl: 0    Allergies   Allergen Reactions    Penicillins Anaphylaxis       No family history on  "file.    Cancer-related family history is not on file.    Social History     Tobacco Use    Smoking status: Former     Packs/day: 1.00     Years: 15.00     Additional pack years: 0.00     Total pack years: 15.00     Types: Cigarettes     Quit date: 10/2023     Years since quittin.0     Passive exposure: Current    Smokeless tobacco: Current   Vaping Use    Vaping Use: Never used   Substance Use Topics    Alcohol use: Never    Drug use: Never     Social History     Social History Narrative    Not on file        Objective:  Vital signs:  Vitals:    10/30/23 1158   BP: 121/75   Pulse: 102   Resp: 18   Temp: 98.2 °F (36.8 °C)   SpO2: 97%   Weight: 67.1 kg (148 lb)   Height: 175.3 cm (69\")   PainSc: 0-No pain     Body mass index is 21.86 kg/m².  ECOG  (1) Restricted in physically strenuous activity, ambulatory and able to do work of light nature    Physical Exam:   Physical Exam  Constitutional:       Appearance: Normal appearance.   HENT:      Head: Normocephalic and atraumatic.   Eyes:      Pupils: Pupils are equal, round, and reactive to light.   Cardiovascular:      Rate and Rhythm: Normal rate and regular rhythm.      Pulses: Normal pulses.      Heart sounds: No murmur heard.  Pulmonary:      Effort: Pulmonary effort is normal.      Breath sounds: Normal breath sounds.   Abdominal:      General: There is no distension.      Palpations: Abdomen is soft. There is no mass.      Tenderness: There is no abdominal tenderness.   Musculoskeletal:         General: Normal range of motion.      Cervical back: Normal range of motion and neck supple.   Skin:     General: Skin is warm.   Neurological:      General: No focal deficit present.      Mental Status: He is alert.   Psychiatric:         Mood and Affect: Mood normal.         Lab Results - Last 18 Months   Lab Units 10/22/23  0402 10/21/23  1212 10/20/23  1459   WBC 10*3/mm3 5.30 5.30 8.00   HEMOGLOBIN g/dL 10.1* 9.7* 11.4*   HEMATOCRIT % 29.5* 29.1* 33.6*   PLATELETS " "10*3/mm3 299 267 272   MCV fL 84.3 84.5 84.5     Lab Results - Last 18 Months   Lab Units 10/22/23  0402 10/21/23  1212 10/20/23  1459   SODIUM mmol/L 136 135* 132*   POTASSIUM mmol/L 4.7 3.5 3.4*   CHLORIDE mmol/L 104 100 93*   CO2 mmol/L 22.0 23.0 24.0   BUN mg/dL 4* 6 8   CREATININE mg/dL 0.86 0.95 1.13   CALCIUM mg/dL 8.5* 8.7 9.6   BILIRUBIN mg/dL  --   --  0.4   ALK PHOS U/L  --   --  158*   ALT (SGPT) U/L  --   --  25   AST (SGOT) U/L  --   --  49*   GLUCOSE mg/dL 94 89 108*       Lab Results   Component Value Date    GLUCOSE 94 10/22/2023    BUN 4 (L) 10/22/2023    CREATININE 0.86 10/22/2023    BCR 4.7 (L) 10/22/2023    K 4.7 10/22/2023    CO2 22.0 10/22/2023    CALCIUM 8.5 (L) 10/22/2023    ALBUMIN 3.6 10/20/2023    AST 49 (H) 10/20/2023    ALT 25 10/20/2023       No results for input(s): \"APTT\", \"INR\", \"PTT\" in the last 78992 hours.    Lab Results   Component Value Date    IRON 34 (L) 10/22/2023    TIBC 174 (L) 10/22/2023    FERRITIN 546.30 (H) 10/20/2023       Lab Results   Component Value Date    FOLATE <2.00 (L) 10/21/2023       No results found for: \"OCCULTBLD\"    No results found for: \"RETICCTPCT\"  Lab Results   Component Value Date    [id number] 276 10/21/2023     No results found for: \"SPEP\", \"UPEP\"  No results found for: \"LDH\", \"URICACID\"  No results found for: \"ALMA\", \"RF\", \"SEDRATE\"  No results found for: \"FIBRINOGEN\", \"HAPTOGLOBIN\"  No results found for: \"PTT\", \"INR\"  No results found for: \"\"  No results found for: \"CEA\"  No components found for: \"CA-19-9\"  No results found for: \"PSA\"  No results found for: \"SEDRATE\"    Assessment & Plan       Patient is a 58-year-old male with chronic smoking history who presents with cough, shortness of breath CT imaging concerning for a large right upper lobe mass mediastinal adenopathy biopsy positive for extensive stage small cell lung cancer     Extensive stage small cell lung cancer  Given osseous metastasis, brain metastasis extensive stage small " cell lung cancer  Patient is not having any symptoms specific to his brain metastasis.  Holding off on SRS to the brain lesion  We will plan on starting chemoimmunotherapy with carboplatin etoposide and Tecentriq  Patient is agreeable discussed side effects in detail with the patient  Chemotherapy side effects include, but not limited to, nausea, vomiting, bone marrow suppression, which can result in blood, platelet transfusion. There is also risk of permanent bone marrow destruction, which can cause myelodysplastic syndrome or leukemia years down the line. There is risk of infection which can result in hospitalization and even death. There is also risk of fatigue, asthenia, alopecia which could become permanent, bone aches and pains, pancytopenia which may result in blood transfusions.  Follow-up with cycle 2-day 1    Anemia  Normocytic anemia  Iron level is low however ferritin is high which could be an acute phase reactant  Monitor CBC obtain complete iron profile including iron saturation B12 and folate levels  Low folate, start replacement.  Continue same       Thank you very much for providing the opportunity to participate in this patient’s care. Please do not hesitate to call if there are any other questions.  Time spent on encounter including record review, history taking, exam, discussion, counseling and documentation at: 40 minutes

## 2023-10-30 NOTE — PROGRESS NOTES
TREATMENT  PREPARATION    Romero Maciel  [id number]  1965    Chief Complaint: Treatment preparation and needs assessment    History of present illness:  Romero Maciel is a 58 y.o. year old male who is here today for treatment preparation and needs assessment.  The patient has been diagnosed with   Encounter Diagnoses   Name Primary?    Small cell lung cancer Yes    Metastasis to brain     Anxiety and depression     and is scheduled to begin treatment with:     Oncology History:    Oncology/Hematology History   Small cell lung cancer   10/25/2023 Initial Diagnosis    Small cell lung cancer     10/31/2023 -  Chemotherapy    OP LUNG Atezolizumab / CARBOplatin AUC=5 / Etoposide (SCLC)         The current medication list and allergy list were reviewed and reconciled.     Past Medical History, Past Surgical History, Social History, Family History have been reviewed and are without significant changes except as mentioned.    Physical Exam:    Vitals:    10/31/23 0853   BP: 116/75   Pulse: 101   Resp: 16   Temp: 97.7 °F (36.5 °C)   SpO2: 95%       Vitals:    10/31/23 0853   PainSc: 0-No pain                        Physical Exam  Constitutional:       General: He is not in acute distress.     Appearance: Normal appearance.   HENT:      Head: Normocephalic and atraumatic.   Eyes:      Extraocular Movements: Extraocular movements intact.   Pulmonary:      Effort: Pulmonary effort is normal. No respiratory distress.   Musculoskeletal:         General: Normal range of motion.      Cervical back: Normal range of motion.   Skin:     General: Skin is warm and dry.   Neurological:      Mental Status: He is alert and oriented to person, place, and time.   Psychiatric:      Comments: Anxious           NEEDS ASSESSMENTS    Genetics  The patient's new diagnosis and family history have been reviewed for genetic counseling needs. The patient will not be referred..     Psychosocial and Barriers to care  The patient has completed a  PHQ-9 Depression Screening and the Distress Thermometer (DT) today.  PHQ-9 results show PHQ-2 Total Score:   PHQ-9 Total Score: PHQ-9 Total Score:  13     Results were reviewed along with psychosocial resources offered by our cancer center.  Our Supportive Oncology team will be flagged for a score of 4 or above, and a same day call will be made for a score of 9 or 10.  A mental health referral is offered at that time. Patients who score less than 4 have been educated on our support services and can be referred to our  upon request.  The patient will be referred to our .  He is also established with a psychiatry team at [facility].      Nutrition  The patient has completed the malnutrition screening today.  Patients beginning at risk treatment regimens or who have dietary concerns will also be referred to our oncology dietitian. The patient will be referred.    Functional Assessment  Persons who are age 70 or greater will be screened for qualification of a comprehensive geriatric assessment by our survivorship nurse practitioner.  Older adults with cancer face unique challenges. These may include an increased risk of drug reactions, financial burdens, and caregiver stress. The patient scored   . Patients scoring 14 or lower will referred for an older adult functional assessment with the survivorship advanced practice registered nurse to ensure all needed support is provided as patients plan for their treatments. NOT APPLICABLE    Intravenous Access Assessment  The patient and I discussed planned intravenous chemo/biotherapy as well as other IV treatments that are often needed throughout the course of treatment. These may include, but are not limited to blood transfusions, antibiotics, and IV hydration. Discussed that depending on selected treatment and vein assessment, patient may require venous access device (VAD) which could include but not limited to a Mediport or PICC line. Risks and  "benefits of VADs reviewed. The patient will be treated via PIV.  Peripheral IV site for first cycle and then will have port placement.    Reproductive/Sexual Activity   People should avoid becoming pregnant and should not get a partner pregnant while undergoing chemo/biotherapy.  People of childbearing age should use effective contraception during active therapy. The best recommendation for all people is to use a barrier method for a minimum of 1 week after the last infusion of chemo/biotherapy to prevent your partner being exposed to byproducts from treatment medications in bodily fluids. Effective contraception should be discussed with your oncology team to make sure it is safe to take based on your diagnosis. Possible options include oral contraceptives, barrier methods. Chemo/biotherapy can change your ability to reproduce children in the future.  There are options for fertility preservation. NOT APPLICABLE    Advanced Care Planning  Advance Care Planning   The patient and I discussed advanced care planning, \"Conversations that Matter\".   This service is offered for development of advance directives with a certified ACP facilitator.  The patient does not have an up-to-date advanced directive. This document is not on file with our office. The patient is not interested in an appointment with one of our facilitators to create or update their advanced directives.               Smoking cessation  Tobacco Use: High Risk (10/31/2023)    Patient History     Smoking Tobacco Use: Former     Smokeless Tobacco Use: Current     Passive Exposure: Current       Patient and I discussed their tobacco use history. Referral will not be made for smoking cessation.      Palliative Care  When appropriate, the patient and I discussed the availability palliative care services and when appropriate Hospice care. Palliative care is not the same as Hospice care which was explained to the patient.The patient is not interested in additional " information from our  on these services.    Survivorship   When appropriate, we discussed that we will refer the patient to survivorship clinic to discuss next steps following completion of planned treatment.  Reviewed this visit will include assessment of your physical, psychological, functional, and spiritual needs as a survivor and the need at attend this visit when scheduled.    TREATMENT EDUCATION    Today I met with the patient to discuss the chemo/biotherapy regimen recommended for treatment of Small cell lung cancer    Metastasis to brain    Anxiety and depression  .  The patient was given explanation of treatment premed side effects including office policy that prohibits patients to drive if sedating medications are administered, MD explanation given regarding benefits, side effects, toxicities and goals of treatment.  The patient received a Chemotherapy/Biotherapy Plan Summary including diagnosis and explanation of specific treatment plan.    SIDE EFFECTS:  Common side effects were discussed with the patient and/or significant other.  Discussion included where applicable hair loss/discoloration, anemia/fatigue, infection/chills/fever, appetite, bleeding risk/precautions, constipation, diarrhea, mouth sores, taste alteration, loss of appetite, nausea/vomiting, peripheral neuropathy, skin/nail changes, rash, muscle aches/weakness, photosensitivity, weight gain/loss, hearing loss, dizziness, menopausal symptoms, menstrual irregularity, sterility, high blood pressure, heart damage, liver damage, lung damage, kidney damage, DVT/PE risk, fluid retention, pleural/pericardial effusion, somnolence, electrolyte/LFT imbalance, vein exercises and/or the possible need for vascular access/port placement.  The patient was advised that although uncommon, leakage of an infused medication from the vein or venous access device may lead to skin breakdown and/or other tissue damage.  The patient was advised that  he/she may have pain, bleeding, and/or bruising from the insertion of a needle in their vein or venous access device (port).  The patient was further advised that, in spite of proper technique, infection with redness and irritation may rarely occur at the site where the needle was inserted.  The patient was advised that if complications occur, additional medical treatment is available.  Finally, where applicable we have reviewed rare but potential immune mediated side effects including shortness of breath, cough, chest pain (pneumonitis), abdominal pain, diarrhea (colitis), thyroiditis (hypothyroid or hyperthyroid), hepatitis and liver dysfunction, nephritis and renal dysfunction.    Discussion also included side effects specific to drugs in the treatment plan, specifically:    Treatment Plans       Name Type Plan Dates Plan Provider         Active    OP LUNG Atezolizumab / CARBOplatin AUC=5 / Etoposide (SCLC) ONCOLOGY TREATMENT  10/24/2023 - Present Jeanette Rubio MD                      Questions answered and additional information discussed on topics including:  Anemia, Thrombocytopenia, Neutropenia, Nutrition and appetite changes, Constipation, Diarrhea, Nausea & vomiting, Mouth sores, Alopecia, Intimate activity, Nervous system changes, Pain, Skin & nail changes, Organ toxicities, and Home care       Assessment and Plan:    Diagnoses and all orders for this visit:    1. Small cell lung cancer (Primary)    2. Metastasis to brain    3. Anxiety and depression      No orders of the defined types were placed in this encounter.        The patient and I have reviewed their diagnosis and scheduled treatment plan. Needs assessment was completed where applicable including genetics, psychosocial needs, barriers to care, VAD evaluation, advanced care planning, survivorship, and palliative care services where indicated. Referrals have been ordered as appropriate based upon evaluation today and patient desires.    Chemo/biotherapy teaching was completed today and consent obtained. See separate documentation for further details.  Adequate time was given to answer questions.  Patient made aware of their care team members and contact information if they have questions or problems throughout the treatment course.  Discussion held and written information provided describing frequency of office visits and ongoing monitoring throughout the treatment plan.     Reviewed with patient any prescribed medication sent to pharmacy.  Education provided regarding proper storage, safe handling, and proper disposal of unused medication.  Proper handling of body fluids and waste discussed and written information provided.  If appropriate, patient had pretreatment labs drawn today.    Learning assessment completed at initial patient encounter. See separate flowsheet. Chemo/biotherapy education comprehension assessed at today's visit.    I spent 60 minutes caring for Romero on this date of service. This time includes time spent by me in the following activities: preparing for the visit, obtaining and/or reviewing a separately obtained history, performing a medically appropriate examination and/or evaluation, counseling and educating the patient/family/caregiver, ordering medications, tests, or procedures, referring and communicating with other health care professionals, documenting information in the medical record, and care coordination.     Екатерина Fisher, APRN   10/31/23    Discussion: Patient met with  to today due to complex mental health history and anxiety.  He is requesting an increase in his Klonopin.  Discussed with Dr. Rubio and I will reach out to his psychiatry team at [facility] to further discuss.  I attempted to call after the visit today but the office is only open from 8 AM until 11:30 AM.  Upon review of his medications and discussion I did note that he is routinely using his DuoNebs every 6 hours whether he  feels he needs them or not from a respiratory standpoint.  We discussed that that amount of albuterol may be increasing his anxiety and contributing to jitteriness and that he should only use his DuoNebs as needed.  He verbalized understanding.

## 2023-10-31 ENCOUNTER — DOCUMENTATION (OUTPATIENT)
Dept: ONCOLOGY | Facility: CLINIC | Age: 58
End: 2023-10-31
Payer: COMMERCIAL

## 2023-10-31 ENCOUNTER — HOSPITAL ENCOUNTER (OUTPATIENT)
Dept: ONCOLOGY | Facility: HOSPITAL | Age: 58
Discharge: HOME | End: 2023-10-31
Admitting: INTERNAL MEDICINE
Payer: COMMERCIAL

## 2023-10-31 ENCOUNTER — OFFICE VISIT (OUTPATIENT)
Dept: ONCOLOGY | Facility: CLINIC | Age: 58
End: 2023-10-31
Payer: COMMERCIAL

## 2023-10-31 VITALS
SYSTOLIC BLOOD PRESSURE: 116 MMHG | RESPIRATION RATE: 16 BRPM | HEIGHT: 69 IN | TEMPERATURE: 97.7 F | HEART RATE: 95 BPM | BODY MASS INDEX: 21.77 KG/M2 | OXYGEN SATURATION: 95 % | WEIGHT: 147 LBS | DIASTOLIC BLOOD PRESSURE: 75 MMHG

## 2023-10-31 VITALS
BODY MASS INDEX: 21.77 KG/M2 | RESPIRATION RATE: 16 BRPM | TEMPERATURE: 97.7 F | WEIGHT: 147 LBS | HEART RATE: 101 BPM | DIASTOLIC BLOOD PRESSURE: 75 MMHG | OXYGEN SATURATION: 95 % | SYSTOLIC BLOOD PRESSURE: 116 MMHG | HEIGHT: 69 IN

## 2023-10-31 DIAGNOSIS — C79.31 METASTASIS TO BRAIN: ICD-10-CM

## 2023-10-31 DIAGNOSIS — F41.9 ANXIETY AND DEPRESSION: ICD-10-CM

## 2023-10-31 DIAGNOSIS — C34.90 SMALL CELL LUNG CANCER: Primary | ICD-10-CM

## 2023-10-31 DIAGNOSIS — F32.A ANXIETY AND DEPRESSION: ICD-10-CM

## 2023-10-31 LAB
ALBUMIN SERPL-MCNC: 3.5 G/DL (ref 3.5–5.2)
ALBUMIN/GLOB SERPL: 1.1 G/DL
ALP SERPL-CCNC: 93 U/L (ref 39–117)
ALT SERPL W P-5'-P-CCNC: 117 U/L (ref 1–41)
ANION GAP SERPL CALCULATED.3IONS-SCNC: 13 MMOL/L (ref 5–15)
AST SERPL-CCNC: 66 U/L (ref 1–40)
BASOPHILS # BLD AUTO: 0.03 10*3/MM3 (ref 0–0.2)
BASOPHILS NFR BLD AUTO: 0.4 % (ref 0–1.5)
BILIRUB SERPL-MCNC: 0.3 MG/DL (ref 0–1.2)
BUN SERPL-MCNC: 24 MG/DL (ref 6–20)
BUN/CREAT SERPL: 21.4 (ref 7–25)
CALCIUM SPEC-SCNC: 9.7 MG/DL (ref 8.6–10.5)
CHLORIDE SERPL-SCNC: 100 MMOL/L (ref 98–107)
CO2 SERPL-SCNC: 24 MMOL/L (ref 22–29)
CREAT BLDA-MCNC: 1.2 MG/DL (ref 0.6–1.3)
CREAT SERPL-MCNC: 1.12 MG/DL (ref 0.76–1.27)
DEPRECATED RDW RBC AUTO: 54.2 FL (ref 37–54)
EGFRCR SERPLBLD CKD-EPI 2021: 69.7 ML/MIN/1.73
EGFRCR SERPLBLD CKD-EPI 2021: 75.7 ML/MIN/1.73
EOSINOPHIL # BLD AUTO: 0.23 10*3/MM3 (ref 0–0.4)
EOSINOPHIL NFR BLD AUTO: 2.7 % (ref 0.3–6.2)
ERYTHROCYTE [DISTWIDTH] IN BLOOD BY AUTOMATED COUNT: 17.8 % (ref 12.3–15.4)
GLOBULIN UR ELPH-MCNC: 3.1 GM/DL
GLUCOSE BLDC GLUCOMTR-MCNC: 138 MG/DL (ref 70–105)
GLUCOSE SERPL-MCNC: 145 MG/DL (ref 65–99)
HCT VFR BLD AUTO: 37.4 % (ref 37.5–51)
HGB BLD-MCNC: 12 G/DL (ref 13–17.7)
LYMPHOCYTES # BLD AUTO: 2.13 10*3/MM3 (ref 0.7–3.1)
LYMPHOCYTES NFR BLD AUTO: 25 % (ref 19.6–45.3)
MCH RBC QN AUTO: 28.9 PG (ref 26.6–33)
MCHC RBC AUTO-ENTMCNC: 32.1 G/DL (ref 31.5–35.7)
MCV RBC AUTO: 90.1 FL (ref 79–97)
MONOCYTES # BLD AUTO: 0.41 10*3/MM3 (ref 0.1–0.9)
MONOCYTES NFR BLD AUTO: 4.8 % (ref 5–12)
NEUTROPHILS NFR BLD AUTO: 5.72 10*3/MM3 (ref 1.7–7)
NEUTROPHILS NFR BLD AUTO: 67.1 % (ref 42.7–76)
PLATELET # BLD AUTO: 338 10*3/MM3 (ref 140–450)
PMV BLD AUTO: 8.7 FL (ref 6–12)
POTASSIUM SERPL-SCNC: 3.8 MMOL/L (ref 3.5–5.2)
PROT SERPL-MCNC: 6.6 G/DL (ref 6–8.5)
RBC # BLD AUTO: 4.15 10*6/MM3 (ref 4.14–5.8)
SODIUM SERPL-SCNC: 137 MMOL/L (ref 136–145)
T3FREE SERPL-MCNC: 4.07 PG/ML (ref 2–4.4)
T4 FREE SERPL-MCNC: 1.27 NG/DL (ref 0.93–1.7)
TSH SERPL DL<=0.05 MIU/L-ACNC: 10.24 UIU/ML (ref 0.27–4.2)
WBC NRBC COR # BLD: 8.52 10*3/MM3 (ref 3.4–10.8)

## 2023-10-31 PROCEDURE — 96375 TX/PRO/DX INJ NEW DRUG ADDON: CPT

## 2023-10-31 PROCEDURE — 82948 REAGENT STRIP/BLOOD GLUCOSE: CPT

## 2023-10-31 PROCEDURE — 25810000003 SODIUM CHLORIDE 0.9 % SOLUTION: Performed by: INTERNAL MEDICINE

## 2023-10-31 PROCEDURE — 25010000002 CARBOPLATIN PER 50 MG: Performed by: INTERNAL MEDICINE

## 2023-10-31 PROCEDURE — 25810000003 SODIUM CHLORIDE 0.9 % SOLUTION 500 ML FLEX CONT: Performed by: INTERNAL MEDICINE

## 2023-10-31 PROCEDURE — 96367 TX/PROPH/DG ADDL SEQ IV INF: CPT

## 2023-10-31 PROCEDURE — 25010000002 DIPHENHYDRAMINE PER 50 MG: Performed by: INTERNAL MEDICINE

## 2023-10-31 PROCEDURE — 25010000002 FOSAPREPITANT PER 1 MG: Performed by: INTERNAL MEDICINE

## 2023-10-31 PROCEDURE — 80050 GENERAL HEALTH PANEL: CPT | Performed by: INTERNAL MEDICINE

## 2023-10-31 PROCEDURE — 25010000002 PALONOSETRON 0.25 MG/5ML SOLUTION PREFILLED SYRINGE: Performed by: INTERNAL MEDICINE

## 2023-10-31 PROCEDURE — 96413 CHEMO IV INFUSION 1 HR: CPT

## 2023-10-31 PROCEDURE — 84481 FREE ASSAY (FT-3): CPT | Performed by: INTERNAL MEDICINE

## 2023-10-31 PROCEDURE — 25010000002 DEXAMETHASONE SODIUM PHOSPHATE 120 MG/30ML SOLUTION: Performed by: INTERNAL MEDICINE

## 2023-10-31 PROCEDURE — 84439 ASSAY OF FREE THYROXINE: CPT | Performed by: INTERNAL MEDICINE

## 2023-10-31 PROCEDURE — 82565 ASSAY OF CREATININE: CPT

## 2023-10-31 PROCEDURE — 25010000002 ATEZOLIZUMAB 1200 MG/20ML SOLUTION 20 ML VIAL: Performed by: INTERNAL MEDICINE

## 2023-10-31 PROCEDURE — 96417 CHEMO IV INFUS EACH ADDL SEQ: CPT

## 2023-10-31 PROCEDURE — 25010000002 ETOPOSIDE 1 GM/50ML SOLUTION 50 ML VIAL: Performed by: INTERNAL MEDICINE

## 2023-10-31 PROCEDURE — 25810000003 SODIUM CHLORIDE 0.9 % SOLUTION 250 ML FLEX CONT: Performed by: INTERNAL MEDICINE

## 2023-10-31 RX ORDER — SODIUM CHLORIDE 9 MG/ML
20 INJECTION, SOLUTION INTRAVENOUS ONCE
Status: COMPLETED | OUTPATIENT
Start: 2023-10-31 | End: 2023-10-31

## 2023-10-31 RX ORDER — DIPHENHYDRAMINE HYDROCHLORIDE 50 MG/ML
25 INJECTION INTRAMUSCULAR; INTRAVENOUS ONCE
Status: COMPLETED | OUTPATIENT
Start: 2023-10-31 | End: 2023-10-31

## 2023-10-31 RX ORDER — PALONOSETRON 0.05 MG/ML
0.25 INJECTION, SOLUTION INTRAVENOUS ONCE
Status: COMPLETED | OUTPATIENT
Start: 2023-10-31 | End: 2023-10-31

## 2023-10-31 RX ORDER — ONDANSETRON HYDROCHLORIDE 8 MG/1
8 TABLET, FILM COATED ORAL 3 TIMES DAILY PRN
Qty: 30 TABLET | Refills: 5 | Status: SHIPPED | OUTPATIENT
Start: 2023-10-31

## 2023-10-31 RX ORDER — OLANZAPINE 5 MG/1
5 TABLET ORAL NIGHTLY
Qty: 4 TABLET | Refills: 3 | Status: SHIPPED | OUTPATIENT
Start: 2023-10-31

## 2023-10-31 RX ADMIN — DIPHENHYDRAMINE HYDROCHLORIDE 25 MG: 50 INJECTION INTRAMUSCULAR; INTRAVENOUS at 09:46

## 2023-10-31 RX ADMIN — PALONOSETRON 0.25 MG: 0.25 INJECTION, SOLUTION INTRAVENOUS at 11:09

## 2023-10-31 RX ADMIN — SODIUM CHLORIDE 20 ML/HR: 9 INJECTION, SOLUTION INTRAVENOUS at 09:44

## 2023-10-31 RX ADMIN — ATEZOLIZUMAB 1200 MG: 1200 INJECTION, SOLUTION INTRAVENOUS at 09:55

## 2023-10-31 RX ADMIN — DEXAMETHASONE SODIUM PHOSPHATE 12 MG: 4 INJECTION, SOLUTION INTRA-ARTICULAR; INTRALESIONAL; INTRAMUSCULAR; INTRAVENOUS; SOFT TISSUE at 11:51

## 2023-10-31 RX ADMIN — FOSAPREPITANT 100 ML: 150 INJECTION, POWDER, LYOPHILIZED, FOR SOLUTION INTRAVENOUS at 11:10

## 2023-10-31 RX ADMIN — CARBOPLATIN 440 MG: 10 INJECTION, SOLUTION INTRAVENOUS at 12:15

## 2023-10-31 RX ADMIN — ETOPOSIDE 180 MG: 20 INJECTION INTRAVENOUS at 12:55

## 2023-10-31 NOTE — PROGRESS NOTES
OSW received request from [facility] and nursing staff to see patient.  OSW met with patient in infusion room for his first chemotherapy treatment.  Patient's brother Suhas, was also present.  OSW made introductions, explained role and resources available.  Suhas stated that patient needs a lot of emotional support.  Patient has several siblings and they are all close and a good support system for patient.  Patient has moved in with Suhas and will remain there throughout treatment and as long as needed.  Suhas spoke for patient at times due to patient becoming emotional and unable to communicate effectively.  Patient apologized for becoming emotional and OSW encouraged him to express himself as he needs to and ensured him that no apologies are needed for his emotions.  OSW obtained partial social history.  Patient complains of tremendous anxiety, worry and fear-so much so that he stated he is “afraid to go to sleep because he might not wake up.”  Patient has been on Clonazepam 1mg for approximately two years.  He started on this medication following the death of his son (33 y.o.  of overdose of heroin and fentanyl).  Clonazepam was ordered by Andres Lopez at [facility] in [place] ([facility] location).  Patient is newly diagnosed and very overwhelmed with the life changes and journey ahead.  He made several comments about being anxious about how “what's in that bag” will affect him.  He lost his mother at age 57 and a brother at age 40 both to cancer.  Patient admits to a “colorful” past but did not elaborate.  He describes his father as having been an alcoholic.  Patient asked about homeopathic treatment in addition to current plan.  OSW suggested that the patient speak with Dr. Rubio about that as there could be negative interactions.   OSW completed Barriers to Care Assessment, TISHA-7, and PHQ-9.  There are no identifiable barriers to care at this time.  Anxiety is severe as noted above.  Patient score on  PHQ-9 indicates minor depression.  Patient indicated a score of 8 on the distress screening tool.  He indicates lack of sleep, fatigue, memory/concentration issues, fear, sadness, and worry.  He also stated that he wants to see his grandsons grow up and is afraid he won't be around long.  Patient does indicate a strong ines which will be a good foundation for him as he goes through this process.  He says he needs the” Bible KJV for dummies” because he can't retain anything.  OSW explained learning styles and suggested he might be more of an auditory learner.  If so, listening to the Bible on tape, sermons on YouTube, etc. might be a good option.  OSW explored other activities patient enjoys to encourage those as ways to reduce anxiety.  IE:  listening to old rock and roll (he likes The Beatles, The Stones, Led Omid, Bashir Lobato, etc.).  OSW taught patient deep breathing technique and encouraged him to use that as needed.  We also discussed trying to stay focused on the here and now and what we know instead of looking too far ahead and focusing on the “what-ifs”.  OSW reviewed need for anti-anxiety medication with DEIDRE Barron who discussed with Dr. Rubio.  Екатерина will be attempting to reach out to [facility] to discuss with prescriber Andres Lopez.  OSW will remain available as needed and will support patient and family as needed.

## 2023-10-31 NOTE — PROGRESS NOTES
"Patient is here for C1D1 carboplatin, etoposide and tecentriq. PIV obtained with positive blood return noted. Handouts on the chemotherapies given and patient stated after seeing Dr. Rubio yesterday he is ok to sign the consent prior to lisa ESTRADA NP doing the more in depth teaching.     Dr. Rubio sent: patient is getting his port on 11/15. are you ok with him getting the carboplatin, etoposide and tecentriq all peripherally today?     Dr. Rubio replied: yes. please check with pharmacy. Pharmacy added and Louie replied: Tecentriq can be given peripherally, Carbo and Etopside are irritant but they can be given peripherally. Just bear in mind the Etopside is given D1,2,3.  Some people do fine    Dr. Rubio sent: Dr. Rubio he thought you said he would get benadryl today to help \"relax him\" but there is no benadryl that I see on the plan. He is very nervous today. also his POC creatinine was 1.2 but his last creatinine on 10/22 was 0.86. are you ok with the POC creatinine or do you want the CMP STAT? Dr. Rubio did you do teaching yesterday on the chemo's and tecentriq. he is not scheduled with lisa until 1130.     Dr. Rubio responded: Let's use the 1.2 to dose. I had talked to him about drugs so consent should be fine. Lisa can go over in detail. He verbally told me he was ok to start. We can give him 25 mg Benadryl as premed-Let's Just do it for today. Patient tolerated treatment and wanted to keep the PIV in for his treatment tomorrow. PIV flushed and had positive blood return at discharge. Patient discharged with AVS.     Dr Rubio notified: is cmp is back his liver enzymes were more elevated 10/31/23  Day 1, Cycle 1  BUN: 24 (H)  Sodium: 137  Potassium: 3.8  Glucose: 138 (H) (Comment: Serial Number: [id number])  Calcium: 9.7  Albumin: 3.5  Total Protein: 6.6  AST (SGOT): 66 (H)  ALT (SGPT): 117 (H)  Alkaline Phosphatase: 93  Total Bilirubin: 0.3  TSH: 10.240 (H)  Free T4: 1.27  eGFR: 69.7  he is leaving " currently but will be back tomorrow at 10.   his last ast was 49 and alt 25 on 10/20     Dr. Rubio replied: ok will keep an eye on that.

## 2023-11-01 ENCOUNTER — NUTRITION (OUTPATIENT)
Dept: ONCOLOGY | Facility: CLINIC | Age: 58
End: 2023-11-01
Payer: COMMERCIAL

## 2023-11-01 ENCOUNTER — HOSPITAL ENCOUNTER (OUTPATIENT)
Dept: ONCOLOGY | Facility: HOSPITAL | Age: 58
Discharge: HOME | End: 2023-11-01
Admitting: INTERNAL MEDICINE
Payer: COMMERCIAL

## 2023-11-01 VITALS
WEIGHT: 147.5 LBS | BODY MASS INDEX: 21.84 KG/M2 | HEIGHT: 69 IN | RESPIRATION RATE: 16 BRPM | HEART RATE: 101 BPM | TEMPERATURE: 98 F | DIASTOLIC BLOOD PRESSURE: 77 MMHG | OXYGEN SATURATION: 94 % | SYSTOLIC BLOOD PRESSURE: 126 MMHG

## 2023-11-01 DIAGNOSIS — C34.90 SMALL CELL LUNG CANCER: Primary | ICD-10-CM

## 2023-11-01 LAB — VIRUS SPEC CULT: NORMAL

## 2023-11-01 PROCEDURE — 25010000002 ETOPOSIDE 1 GM/50ML SOLUTION 50 ML VIAL: Performed by: INTERNAL MEDICINE

## 2023-11-01 PROCEDURE — 96375 TX/PRO/DX INJ NEW DRUG ADDON: CPT

## 2023-11-01 PROCEDURE — 25810000003 SODIUM CHLORIDE 0.9 % SOLUTION: Performed by: INTERNAL MEDICINE

## 2023-11-01 PROCEDURE — 25810000003 SODIUM CHLORIDE 0.9 % SOLUTION 500 ML FLEX CONT: Performed by: INTERNAL MEDICINE

## 2023-11-01 PROCEDURE — 96413 CHEMO IV INFUSION 1 HR: CPT

## 2023-11-01 PROCEDURE — 25010000002 DEXAMETHASONE SODIUM PHOSPHATE 120 MG/30ML SOLUTION: Performed by: INTERNAL MEDICINE

## 2023-11-01 PROCEDURE — 96367 TX/PROPH/DG ADDL SEQ IV INF: CPT

## 2023-11-01 RX ORDER — SODIUM CHLORIDE 9 MG/ML
20 INJECTION, SOLUTION INTRAVENOUS ONCE
Status: COMPLETED | OUTPATIENT
Start: 2023-11-01 | End: 2023-11-01

## 2023-11-01 RX ADMIN — DEXAMETHASONE SODIUM PHOSPHATE 12 MG: 4 INJECTION, SOLUTION INTRA-ARTICULAR; INTRALESIONAL; INTRAMUSCULAR; INTRAVENOUS; SOFT TISSUE at 09:58

## 2023-11-01 RX ADMIN — ETOPOSIDE 180 MG: 20 INJECTION INTRAVENOUS at 10:21

## 2023-11-01 RX ADMIN — SODIUM CHLORIDE 20 ML/HR: 9 INJECTION, SOLUTION INTRAVENOUS at 09:57

## 2023-11-01 NOTE — PROGRESS NOTES
"                 Nutrition Assessment  Romero Maciel    Height: 5'9\"  Weight: 147.4#  BMI: 21.8  Weight Hx:   Wt Readings from Last 20 Encounters:   11/01/23 66.9 kg (147 lb 8 oz)   10/31/23 66.7 kg (147 lb)   10/31/23 66.7 kg (147 lb)   10/30/23 67.1 kg (148 lb)   10/30/23 67.3 kg (148 lb 6.4 oz)   10/24/23 65.5 kg (144 lb 6.4 oz)   10/20/23 65.7 kg (144 lb 13.5 oz)     IBW: 160# (92.1#)  UBW: 165# (89.3%)    Nutrition Questionnaire    Food Allergies: Pt denied allergies at this time    Chewing Problems: Pt denied chewing problems at this time. Pt w/bottom dentures, pt w/o top teeth or dentures. Pt has to avoid certain foods, tough steak     Swallowing Problems: Pt states food gets stuck on occasion. Pt said he has to eat slowly and take his time chewing to avoid this.    Who does the cooking & shopping: Pt's sisterLeticia helps him. Pt currently living with his sister and his brother-in-law, Suhas. Pt with his sister-in-law, Nakul today, his \"\"    Nausea/Vomiting/Diarrhea: Pt denies n/v/d/c    Appetite: good    24-Hour Diet Recall:  Breakfast - toast w/butter, two runny eggs, water  Lunch - two apple sauces [facility]  Dinner - [facility] chicken leg, mashed potatoes, gravy, water  Snacks - nothing  Water ~64 oz/day    Discussion: Met the pt and his sister-in-law, Nakul, to provide new-pt diet education. We reviewed possible side effects of his treatment and how it may impact his ability to eat and tolerate food. We discussed the importance of weight maintenance, consuming adequate calories and protein, even when appetite is low, taste changes occur, and energy is low, pt verbalized understanding. We reviewed ways to manage side effects with diet, pt verbalized understanding.    All questions and concerns were addressed and answered. I provided my contact information and encouraged him to call or schedule an appointment as needed.    Nutrition-Related Side Effects:    []Abdominal " Pain  []Constipation  [x]Diarrhea  []Electrolyte Imbalance  []Fatigue  []HTN  []Hypertriglyceridemia  []Hyponatremia  []Loss of Appetite  []Low Blood Pressure  [x]Metallic Taste  [x]Mouth Sores  [x]Nausea  [x]Neutropenia  []Peripheral Neuropathy  []Proteinuria  []Shortness of Breath  []Taste Changes  [x]Vomiting  [x]Weakness  []Weight Loss  []Other    Nargis Stevens, MS,RD,LD-[place],CD-[place]  Registered Dietitian

## 2023-11-02 ENCOUNTER — HOSPITAL ENCOUNTER (OUTPATIENT)
Dept: ONCOLOGY | Facility: HOSPITAL | Age: 58
Discharge: HOME | End: 2023-11-02
Admitting: INTERNAL MEDICINE
Payer: COMMERCIAL

## 2023-11-02 VITALS
OXYGEN SATURATION: 97 % | HEIGHT: 69 IN | HEART RATE: 92 BPM | WEIGHT: 147 LBS | BODY MASS INDEX: 21.77 KG/M2 | TEMPERATURE: 97.8 F | RESPIRATION RATE: 16 BRPM | SYSTOLIC BLOOD PRESSURE: 119 MMHG | DIASTOLIC BLOOD PRESSURE: 77 MMHG

## 2023-11-02 DIAGNOSIS — C34.90 SMALL CELL LUNG CANCER: Primary | ICD-10-CM

## 2023-11-02 LAB
FUNGUS WND CULT: ABNORMAL
FUNGUS WND CULT: ABNORMAL

## 2023-11-02 PROCEDURE — 25810000003 SODIUM CHLORIDE 0.9 % SOLUTION 500 ML FLEX CONT: Performed by: INTERNAL MEDICINE

## 2023-11-02 PROCEDURE — 25010000002 ETOPOSIDE 1 GM/50ML SOLUTION 50 ML VIAL: Performed by: INTERNAL MEDICINE

## 2023-11-02 PROCEDURE — 96413 CHEMO IV INFUSION 1 HR: CPT

## 2023-11-02 PROCEDURE — 96367 TX/PROPH/DG ADDL SEQ IV INF: CPT

## 2023-11-02 PROCEDURE — 25010000002 DEXAMETHASONE SODIUM PHOSPHATE 120 MG/30ML SOLUTION: Performed by: INTERNAL MEDICINE

## 2023-11-02 PROCEDURE — 96375 TX/PRO/DX INJ NEW DRUG ADDON: CPT

## 2023-11-02 PROCEDURE — 25810000003 SODIUM CHLORIDE 0.9 % SOLUTION: Performed by: INTERNAL MEDICINE

## 2023-11-02 RX ORDER — SODIUM CHLORIDE 9 MG/ML
20 INJECTION, SOLUTION INTRAVENOUS ONCE
Status: COMPLETED | OUTPATIENT
Start: 2023-11-02 | End: 2023-11-02

## 2023-11-02 RX ADMIN — DEXAMETHASONE SODIUM PHOSPHATE 12 MG: 4 INJECTION, SOLUTION INTRA-ARTICULAR; INTRALESIONAL; INTRAMUSCULAR; INTRAVENOUS; SOFT TISSUE at 10:24

## 2023-11-02 RX ADMIN — SODIUM CHLORIDE 20 ML/HR: 9 INJECTION, SOLUTION INTRAVENOUS at 10:19

## 2023-11-02 RX ADMIN — ETOPOSIDE 180 MG: 20 INJECTION INTRAVENOUS at 10:48

## 2023-11-05 LAB
MYCOBACTERIUM SPEC CULT: NORMAL
NIGHT BLUE STAIN TISS: NORMAL

## 2023-11-07 ENCOUNTER — LAB (OUTPATIENT)
Dept: LAB | Facility: HOSPITAL | Age: 58
End: 2023-11-07
Payer: COMMERCIAL

## 2023-11-07 DIAGNOSIS — C34.90 SMALL CELL LUNG CANCER: ICD-10-CM

## 2023-11-07 LAB
BASOPHILS # BLD AUTO: 0.01 10*3/MM3 (ref 0–0.2)
BASOPHILS NFR BLD AUTO: 0.6 % (ref 0–1.5)
DEPRECATED RDW RBC AUTO: 55.1 FL (ref 37–54)
EOSINOPHIL # BLD AUTO: 0.05 10*3/MM3 (ref 0–0.4)
EOSINOPHIL NFR BLD AUTO: 3 % (ref 0.3–6.2)
ERYTHROCYTE [DISTWIDTH] IN BLOOD BY AUTOMATED COUNT: 17.6 % (ref 12.3–15.4)
HCT VFR BLD AUTO: 32.8 % (ref 37.5–51)
HGB BLD-MCNC: 10.5 G/DL (ref 13–17.7)
LYMPHOCYTES # BLD AUTO: 0.54 10*3/MM3 (ref 0.7–3.1)
LYMPHOCYTES NFR BLD AUTO: 32.7 % (ref 19.6–45.3)
MCH RBC QN AUTO: 28.6 PG (ref 26.6–33)
MCHC RBC AUTO-ENTMCNC: 32 G/DL (ref 31.5–35.7)
MCV RBC AUTO: 89.4 FL (ref 79–97)
MONOCYTES # BLD AUTO: 0.01 10*3/MM3 (ref 0.1–0.9)
MONOCYTES NFR BLD AUTO: 0.6 % (ref 5–12)
NEUTROPHILS NFR BLD AUTO: 1.04 10*3/MM3 (ref 1.7–7)
NEUTROPHILS NFR BLD AUTO: 63.1 % (ref 42.7–76)
PLATELET # BLD AUTO: 100 10*3/MM3 (ref 140–450)
PMV BLD AUTO: 9 FL (ref 6–12)
RBC # BLD AUTO: 3.67 10*6/MM3 (ref 4.14–5.8)
WBC NRBC COR # BLD: 1.65 10*3/MM3 (ref 3.4–10.8)

## 2023-11-07 PROCEDURE — 85025 COMPLETE CBC W/AUTO DIFF WBC: CPT

## 2023-11-07 PROCEDURE — 36415 COLL VENOUS BLD VENIPUNCTURE: CPT

## 2023-11-09 ENCOUNTER — TELEPHONE (OUTPATIENT)
Dept: ONCOLOGY | Facility: CLINIC | Age: 58
End: 2023-11-09
Payer: COMMERCIAL

## 2023-11-09 NOTE — TELEPHONE ENCOUNTER
OSW called Andres at [facility] to request that he continue prescribing the Klonopin per Dr. Rubio's request.  OSW has left three voicemails and has not yet received a return call.  DEIDRE Barron was informed that messages had been left.  OSW will follow up as requested.

## 2023-11-12 LAB
MYCOBACTERIUM SPEC CULT: NORMAL
NIGHT BLUE STAIN TISS: NORMAL

## 2023-11-14 ENCOUNTER — LAB (OUTPATIENT)
Dept: LAB | Facility: HOSPITAL | Age: 58
End: 2023-11-14
Payer: COMMERCIAL

## 2023-11-14 DIAGNOSIS — C34.90 SMALL CELL LUNG CANCER: ICD-10-CM

## 2023-11-14 DIAGNOSIS — T45.1X5A CHEMOTHERAPY INDUCED NEUTROPENIA: ICD-10-CM

## 2023-11-14 DIAGNOSIS — D70.1 CHEMOTHERAPY INDUCED NEUTROPENIA: ICD-10-CM

## 2023-11-14 DIAGNOSIS — C34.90 SMALL CELL LUNG CANCER: Primary | ICD-10-CM

## 2023-11-14 DIAGNOSIS — C79.31 METASTASIS TO BRAIN: ICD-10-CM

## 2023-11-14 LAB
BASOPHILS # BLD AUTO: 0.01 10*3/MM3 (ref 0–0.2)
BASOPHILS NFR BLD AUTO: 1 % (ref 0–1.5)
DEPRECATED RDW RBC AUTO: 54.8 FL (ref 37–54)
EOSINOPHIL # BLD AUTO: 0.01 10*3/MM3 (ref 0–0.4)
EOSINOPHIL NFR BLD AUTO: 1 % (ref 0.3–6.2)
ERYTHROCYTE [DISTWIDTH] IN BLOOD BY AUTOMATED COUNT: 17.3 % (ref 12.3–15.4)
HCT VFR BLD AUTO: 28 % (ref 37.5–51)
HGB BLD-MCNC: 8.7 G/DL (ref 13–17.7)
LYMPHOCYTES # BLD AUTO: 0.8 10*3/MM3 (ref 0.7–3.1)
LYMPHOCYTES NFR BLD AUTO: 80.8 % (ref 19.6–45.3)
MCH RBC QN AUTO: 28.8 PG (ref 26.6–33)
MCHC RBC AUTO-ENTMCNC: 31.1 G/DL (ref 31.5–35.7)
MCV RBC AUTO: 92.7 FL (ref 79–97)
MONOCYTES # BLD AUTO: 0.13 10*3/MM3 (ref 0.1–0.9)
MONOCYTES NFR BLD AUTO: 13.1 % (ref 5–12)
NEUTROPHILS NFR BLD AUTO: 0.04 10*3/MM3 (ref 1.7–7)
NEUTROPHILS NFR BLD AUTO: 4.1 % (ref 42.7–76)
PLATELET # BLD AUTO: 75 10*3/MM3 (ref 140–450)
PMV BLD AUTO: 10.5 FL (ref 6–12)
RBC # BLD AUTO: 3.02 10*6/MM3 (ref 4.14–5.8)
WBC NRBC COR # BLD: 0.99 10*3/MM3 (ref 3.4–10.8)

## 2023-11-14 PROCEDURE — 85025 COMPLETE CBC W/AUTO DIFF WBC: CPT

## 2023-11-14 PROCEDURE — 36415 COLL VENOUS BLD VENIPUNCTURE: CPT

## 2023-11-14 RX ORDER — LEVOFLOXACIN 500 MG/1
500 TABLET, FILM COATED ORAL DAILY
Qty: 7 TABLET | Refills: 0 | Status: SHIPPED | OUTPATIENT
Start: 2023-11-14

## 2023-11-14 NOTE — TELEPHONE ENCOUNTER
Spoke w/ Romero and his sister Leticia and informed them that his WBC's and ANC are extremely low and that Dr. Rubio would like for him to start on Levaquin daily x 1 week.  I also let him know that we are trying to get Zarxio approved for him to get daily this week for his counts.  I let him know that he would need to come in daily for cbc's and possible injection if insurance will approve it.  I also let him know that Dr. Rubio is going to add Udenyca to his chemo treatments if insurance approves it.  He and his sister v/u.  He states that he is scheduled for a port placement in the morning.  Per Dr. Rubio he will need to reschedule that as his counts are too low.  He v/u.  IR already gone for the day so an IM was sent to Melisa in IR letting her know that he will not be at his 0800 appointment in the morning.  Voicemail also left for IR.

## 2023-11-15 ENCOUNTER — LAB (OUTPATIENT)
Dept: LAB | Facility: HOSPITAL | Age: 58
End: 2023-11-15
Payer: COMMERCIAL

## 2023-11-15 ENCOUNTER — HOSPITAL ENCOUNTER (OUTPATIENT)
Dept: INTERVENTIONAL RADIOLOGY/VASCULAR | Facility: HOSPITAL | Age: 58
Discharge: HOME | End: 2023-11-15
Payer: COMMERCIAL

## 2023-11-15 ENCOUNTER — HOSPITAL ENCOUNTER (OUTPATIENT)
Dept: ONCOLOGY | Facility: HOSPITAL | Age: 58
Discharge: HOME | End: 2023-11-15
Payer: COMMERCIAL

## 2023-11-15 ENCOUNTER — TELEPHONE (OUTPATIENT)
Dept: ONCOLOGY | Facility: CLINIC | Age: 58
End: 2023-11-15
Payer: COMMERCIAL

## 2023-11-15 VITALS — DIASTOLIC BLOOD PRESSURE: 73 MMHG | HEART RATE: 91 BPM | SYSTOLIC BLOOD PRESSURE: 122 MMHG

## 2023-11-15 DIAGNOSIS — T45.1X5A CHEMOTHERAPY INDUCED NEUTROPENIA: Primary | ICD-10-CM

## 2023-11-15 DIAGNOSIS — C34.90 SMALL CELL LUNG CANCER: ICD-10-CM

## 2023-11-15 DIAGNOSIS — T45.1X5A CHEMOTHERAPY INDUCED NEUTROPENIA: ICD-10-CM

## 2023-11-15 DIAGNOSIS — D70.1 CHEMOTHERAPY INDUCED NEUTROPENIA: Primary | ICD-10-CM

## 2023-11-15 DIAGNOSIS — D70.1 CHEMOTHERAPY INDUCED NEUTROPENIA: ICD-10-CM

## 2023-11-15 DIAGNOSIS — C79.31 METASTASIS TO BRAIN: ICD-10-CM

## 2023-11-15 LAB
BASOPHILS # BLD AUTO: 0.33 10*3/MM3 (ref 0–0.2)
BASOPHILS NFR BLD AUTO: 16.6 % (ref 0–1.5)
DEPRECATED RDW RBC AUTO: 53.1 FL (ref 37–54)
EOSINOPHIL # BLD AUTO: 0 10*3/MM3 (ref 0–0.4)
EOSINOPHIL NFR BLD AUTO: 0 % (ref 0.3–6.2)
ERYTHROCYTE [DISTWIDTH] IN BLOOD BY AUTOMATED COUNT: 17.9 % (ref 12.3–15.4)
HCT VFR BLD AUTO: 28 % (ref 37.5–51)
HGB BLD-MCNC: 9 G/DL (ref 13–17.7)
LYMPHOCYTES # BLD AUTO: 0.8 10*3/MM3 (ref 0.7–3.1)
LYMPHOCYTES NFR BLD AUTO: 40.2 % (ref 19.6–45.3)
MCH RBC QN AUTO: 29.4 PG (ref 26.6–33)
MCHC RBC AUTO-ENTMCNC: 32.1 G/DL (ref 31.5–35.7)
MCV RBC AUTO: 91.5 FL (ref 79–97)
MONOCYTES # BLD AUTO: 0.36 10*3/MM3 (ref 0.1–0.9)
MONOCYTES NFR BLD AUTO: 18.1 % (ref 5–12)
NEUTROPHILS NFR BLD AUTO: 0.5 10*3/MM3 (ref 1.7–7)
NEUTROPHILS NFR BLD AUTO: 25.1 % (ref 42.7–76)
PLATELET # BLD AUTO: 97 10*3/MM3 (ref 140–450)
PMV BLD AUTO: 9.9 FL (ref 6–12)
RBC # BLD AUTO: 3.06 10*6/MM3 (ref 4.14–5.8)
WBC NRBC COR # BLD: 1.99 10*3/MM3 (ref 3.4–10.8)

## 2023-11-15 PROCEDURE — 36415 COLL VENOUS BLD VENIPUNCTURE: CPT

## 2023-11-15 PROCEDURE — 96372 THER/PROPH/DIAG INJ SC/IM: CPT

## 2023-11-15 PROCEDURE — 85025 COMPLETE CBC W/AUTO DIFF WBC: CPT

## 2023-11-15 PROCEDURE — 25010000002 FILGRASTIM-SNDZ 480 MCG/0.8ML SOLUTION PREFILLED SYRINGE: Performed by: INTERNAL MEDICINE

## 2023-11-15 RX ORDER — SODIUM CHLORIDE 0.9 % (FLUSH) 0.9 %
10 SYRINGE (ML) INJECTION AS NEEDED
Status: DISCONTINUED | OUTPATIENT
Start: 2023-11-15 | End: 2023-11-18 | Stop reason: HOSPADM

## 2023-11-15 RX ORDER — SODIUM CHLORIDE 9 MG/ML
75 INJECTION, SOLUTION INTRAVENOUS CONTINUOUS
Status: DISCONTINUED | OUTPATIENT
Start: 2023-11-15 | End: 2023-11-18 | Stop reason: HOSPADM

## 2023-11-15 RX ORDER — SODIUM CHLORIDE 0.9 % (FLUSH) 0.9 %
10 SYRINGE (ML) INJECTION EVERY 12 HOURS SCHEDULED
Status: DISCONTINUED | OUTPATIENT
Start: 2023-11-15 | End: 2023-11-18 | Stop reason: HOSPADM

## 2023-11-15 RX ADMIN — FILGRASTIM-SNDZ 480 MCG: 480 INJECTION, SOLUTION INTRAVENOUS; SUBCUTANEOUS at 13:40

## 2023-11-15 NOTE — TELEPHONE ENCOUNTER
Called and spoke w/ pt and let him know that the Zarxio has been approved.  Scheduled pt for today at 1320 for lab and 1330 for injection.  Pt v/u.

## 2023-11-15 NOTE — PROGRESS NOTES
Pt here for zarxio.  Injection given per MAR.  Pt tolerated well.  Pt d/c home with AVS after 30 minute observation.

## 2023-11-16 ENCOUNTER — LAB (OUTPATIENT)
Dept: LAB | Facility: HOSPITAL | Age: 58
End: 2023-11-16
Payer: COMMERCIAL

## 2023-11-16 ENCOUNTER — HOSPITAL ENCOUNTER (OUTPATIENT)
Dept: ONCOLOGY | Facility: HOSPITAL | Age: 58
Discharge: HOME | End: 2023-11-16
Payer: COMMERCIAL

## 2023-11-16 VITALS — SYSTOLIC BLOOD PRESSURE: 110 MMHG | HEART RATE: 96 BPM | DIASTOLIC BLOOD PRESSURE: 70 MMHG

## 2023-11-16 DIAGNOSIS — T45.1X5A CHEMOTHERAPY INDUCED NEUTROPENIA: Primary | ICD-10-CM

## 2023-11-16 DIAGNOSIS — T45.1X5A CHEMOTHERAPY INDUCED NEUTROPENIA: ICD-10-CM

## 2023-11-16 DIAGNOSIS — D70.1 CHEMOTHERAPY INDUCED NEUTROPENIA: ICD-10-CM

## 2023-11-16 DIAGNOSIS — D70.1 CHEMOTHERAPY INDUCED NEUTROPENIA: Primary | ICD-10-CM

## 2023-11-16 LAB
BASOPHILS # BLD AUTO: 0 10*3/MM3 (ref 0–0.2)
BASOPHILS NFR BLD AUTO: 0 % (ref 0–1.5)
DEPRECATED RDW RBC AUTO: 57.1 FL (ref 37–54)
EOSINOPHIL # BLD AUTO: 0.01 10*3/MM3 (ref 0–0.4)
EOSINOPHIL NFR BLD AUTO: 0.3 % (ref 0.3–6.2)
ERYTHROCYTE [DISTWIDTH] IN BLOOD BY AUTOMATED COUNT: 18.7 % (ref 12.3–15.4)
FUNGUS WND CULT: ABNORMAL
FUNGUS WND CULT: ABNORMAL
HCT VFR BLD AUTO: 30.4 % (ref 37.5–51)
HGB BLD-MCNC: 8.9 G/DL (ref 13–17.7)
LYMPHOCYTES # BLD AUTO: 1.02 10*3/MM3 (ref 0.7–3.1)
LYMPHOCYTES NFR BLD AUTO: 29.7 % (ref 19.6–45.3)
MCH RBC QN AUTO: 28.5 PG (ref 26.6–33)
MCHC RBC AUTO-ENTMCNC: 29.3 G/DL (ref 31.5–35.7)
MCV RBC AUTO: 97.4 FL (ref 79–97)
MONOCYTES # BLD AUTO: 0.55 10*3/MM3 (ref 0.1–0.9)
MONOCYTES NFR BLD AUTO: 16 % (ref 5–12)
NEUTROPHILS NFR BLD AUTO: 1.86 10*3/MM3 (ref 1.7–7)
NEUTROPHILS NFR BLD AUTO: 54 % (ref 42.7–76)
PLATELET # BLD AUTO: 99 10*3/MM3 (ref 140–450)
PMV BLD AUTO: 9.2 FL (ref 6–12)
RBC # BLD AUTO: 3.12 10*6/MM3 (ref 4.14–5.8)
WBC NRBC COR # BLD: 3.44 10*3/MM3 (ref 3.4–10.8)

## 2023-11-16 PROCEDURE — 85025 COMPLETE CBC W/AUTO DIFF WBC: CPT

## 2023-11-16 PROCEDURE — 96372 THER/PROPH/DIAG INJ SC/IM: CPT

## 2023-11-16 PROCEDURE — 25010000002 FILGRASTIM-SNDZ 480 MCG/0.8ML SOLUTION PREFILLED SYRINGE: Performed by: INTERNAL MEDICINE

## 2023-11-16 RX ADMIN — FILGRASTIM-SNDZ 480 MCG: 480 INJECTION, SOLUTION INTRAVENOUS; SUBCUTANEOUS at 10:04

## 2023-11-17 ENCOUNTER — HOSPITAL ENCOUNTER (OUTPATIENT)
Dept: ONCOLOGY | Facility: HOSPITAL | Age: 58
Discharge: HOME | End: 2023-11-17
Payer: COMMERCIAL

## 2023-11-17 ENCOUNTER — LAB (OUTPATIENT)
Dept: LAB | Facility: HOSPITAL | Age: 58
End: 2023-11-17
Payer: COMMERCIAL

## 2023-11-17 DIAGNOSIS — T45.1X5A CHEMOTHERAPY INDUCED NEUTROPENIA: Primary | ICD-10-CM

## 2023-11-17 DIAGNOSIS — D70.1 CHEMOTHERAPY INDUCED NEUTROPENIA: ICD-10-CM

## 2023-11-17 DIAGNOSIS — D70.1 CHEMOTHERAPY INDUCED NEUTROPENIA: Primary | ICD-10-CM

## 2023-11-17 DIAGNOSIS — T45.1X5A CHEMOTHERAPY INDUCED NEUTROPENIA: ICD-10-CM

## 2023-11-17 LAB
BASOPHILS # BLD AUTO: 0.01 10*3/MM3 (ref 0–0.2)
BASOPHILS NFR BLD AUTO: 0.1 % (ref 0–1.5)
DEPRECATED RDW RBC AUTO: 56.5 FL (ref 37–54)
EOSINOPHIL # BLD AUTO: 0.03 10*3/MM3 (ref 0–0.4)
EOSINOPHIL NFR BLD AUTO: 0.3 % (ref 0.3–6.2)
ERYTHROCYTE [DISTWIDTH] IN BLOOD BY AUTOMATED COUNT: 19.3 % (ref 12.3–15.4)
HCT VFR BLD AUTO: 29.7 % (ref 37.5–51)
HGB BLD-MCNC: 9.2 G/DL (ref 13–17.7)
LYMPHOCYTES # BLD AUTO: 1.61 10*3/MM3 (ref 0.7–3.1)
LYMPHOCYTES NFR BLD AUTO: 17 % (ref 19.6–45.3)
MCH RBC QN AUTO: 29.1 PG (ref 26.6–33)
MCHC RBC AUTO-ENTMCNC: 31 G/DL (ref 31.5–35.7)
MCV RBC AUTO: 94 FL (ref 79–97)
MONOCYTES # BLD AUTO: 1.27 10*3/MM3 (ref 0.1–0.9)
MONOCYTES NFR BLD AUTO: 13.4 % (ref 5–12)
NEUTROPHILS NFR BLD AUTO: 6.57 10*3/MM3 (ref 1.7–7)
NEUTROPHILS NFR BLD AUTO: 69.2 % (ref 42.7–76)
PLATELET # BLD AUTO: 143 10*3/MM3 (ref 140–450)
PMV BLD AUTO: 9.9 FL (ref 6–12)
RBC # BLD AUTO: 3.16 10*6/MM3 (ref 4.14–5.8)
WBC NRBC COR # BLD AUTO: 9.49 10*3/MM3 (ref 3.4–10.8)

## 2023-11-17 PROCEDURE — 36415 COLL VENOUS BLD VENIPUNCTURE: CPT

## 2023-11-17 PROCEDURE — 85025 COMPLETE CBC W/AUTO DIFF WBC: CPT

## 2023-11-17 NOTE — PROGRESS NOTES
Patient is here for zarxio injection. Parameters for the injection were not met today. ANC was 6.97. injection not given per secure chat with Dr. Rubio. Dr. Rubio also said he is good to not get any zarxio over the weekend and does not need labs again until treatment on Monday. Patient discharged with AVS.

## 2023-11-17 NOTE — PROGRESS NOTES
HEMATOLOGY ONCOLOGY OUTPATIENT FOLLOW UP       Patient name: Romero Maciel  : 1965  MRN: [id number]  Primary Care Physician: Jennifer Maynard APRN  Referring Physician: No ref. provider found  Reason For Consult:         History of Present Illness:    Romero Maciel is a 58 y.o. male who presented to [facility] on 10/20/2023 with complaints of cough, abnormal chest xray.     Patient is a 58-year-old male with history of 60 pack year smoking who has been complaining of shortness of breath, cough weight loss of over 20 pounds over the last few months.  He also started to have hoarseness of voice.  He was seen by PCP and a chest x-ray was ordered which was abnormal patient was advised to come to the ER     10/20/2023 CT chest with very large right upper lobe mass which extends into the right hilum and mediastinum with gross mediastinal adenopathy findings compatible with malignancy.  Postobstructive infiltrates.  Minimal right pleural effusion    10/22/2023 -bronchoscopy with endobronchial biopsy of the mass in the right mainstem bronchus biopsy positive for small cell lung cancer    10/22/2023 -MRI brain with 1.7 cm peripherally enhancing mass within the right occipital lobe with mild surrounding edema concerning for brain metastasis additional findings compatible with chronic microvascular ischemic changes.  Patient was started on dexamethasone    10/26/2023 -PET/CT with large right upper lobe hypermetabolic mass extending to the right hilum consistent with malignancy mass encases the right mainstem bronchus and lobar bronchi with areas of cavitating consolidation in the posterior right upper lobe and groundglass opacities in the right upper lobe likely postobstructive pneumonia.  Confluent hypermetabolic mediastinal adenopathy in the right paratracheal, subcarinal and right hilar region consistent with metastatic adenopathy.  Large right paratracheal logan masses mass effect on the SVC.   Hypermetabolic left paratracheal and right supraclavicular metastatic adenopathy.  No hypermetabolic adenopathy in the abdomen or pelvis.  Multiple hypermetabolic osseous lesions consistent with osseous metastasis.    10/31/23 - started chemoimmunotherapy tolerated well       Subjective:  Tolerated treatment well. No significant nausea. Had some fatigue.    Past Medical History:   Diagnosis Date    Recurrent dislocation of knee 10/24/2023       Past Surgical History:   Procedure Laterality Date    BRONCHOSCOPY N/A 10/22/2023    Procedure: BRONCHOSCOPY WITH CRYO BIOPSY X1 AREA,  FINE NEEDLE ASPIRATION, AND BRONCHOALVEOLAR LAVAGE;  Surgeon: Genia Jenkins MD;  Location: [facility] ENDOSCOPY;  Service: Pulmonary;  Laterality: N/A;  POST: LUNG MASS         Current Outpatient Medications:     budesonide (PULMICORT) 0.5 MG/2ML nebulizer solution, Take 2 mL by nebulization 2 (Two) Times a Day., Disp: 120 mL, Rfl: 0    clonazePAM (KlonoPIN) 1 MG tablet, Take 1 tablet by mouth 2 (Two) Times a Day As Needed., Disp: , Rfl:     dexAMETHasone (DECADRON) 6 MG tablet, Take 1 tablet by mouth Daily With Breakfast. (Patient not taking: Reported on 11/9/2023), Disp: 7 tablet, Rfl: 0    doxepin (SINEquan) 75 MG capsule, Take 1 capsule by mouth Every Night., Disp: , Rfl:     guaiFENesin (MUCINEX) 600 MG 12 hr tablet, Take 2 tablets by mouth 2 (Two) Times a Day. (Patient not taking: Reported on 10/25/2023), Disp: , Rfl:     ipratropium-albuterol (DUO-NEB) 0.5-2.5 mg/3 ml nebulizer, Take 3 mL by nebulization Every 4 (Four) Hours As Needed for Wheezing., Disp: 180 mL, Rfl: 0    levoFLOXacin (Levaquin) 500 MG tablet, Take 1 tablet by mouth Daily. Take 1 tablet daily x 1 week, Disp: 7 tablet, Rfl: 0    LORazepam (Ativan) 1 MG tablet, Take 1 tablet by mouth Daily. On MRI and Radiation days, Disp: 4 tablet, Rfl: 0    mirtazapine (REMERON) 45 MG tablet, Take 1 tablet by mouth Every Night., Disp: , Rfl:     nicotine (NICODERM CQ) 21 MG/24HR patch,  Place 1 patch on the skin as directed by provider Daily. (Patient not taking: Reported on 2023), Disp: 28 each, Rfl: 0    OLANZapine (zyPREXA) 5 MG tablet, Take 1 tablet by mouth Every Night. Take nightly x 4 starting night of chemotherapy. (Patient not taking: Reported on 2023), Disp: 4 tablet, Rfl: 3    ondansetron (ZOFRAN) 8 MG tablet, Take 1 tablet by mouth 3 (Three) Times a Day As Needed for Nausea or Vomiting., Disp: 30 tablet, Rfl: 5  No current facility-administered medications for this visit.    Facility-Administered Medications Ordered in Other Visits:     Atezolizumab (TECENTRIQ) 1,200 mg in sodium chloride 0.9 % 270 mL chemo IVPB, 1,200 mg, Intravenous, Once, Jeanette Rubio MD    CARBOplatin (PARAPLATIN) 490 mg in sodium chloride 0.9 % 299 mL chemo IVPB, 490 mg, Intravenous, Once, Jeanette Rubio MD    dexAMETHasone (DECADRON) IVPB 12 mg, 12 mg, Intravenous, Once, Jeanette Rubio MD    etoposide (TOPOSAR) 180 mg in sodium chloride 0.9 % 509 mL chemo IVPB, 100 mg/m2 (Treatment Plan Recorded), Intravenous, Once, Jeanette Rubio MD    FOSAPREPITANT 150 MG/100ML NORMAL SALINE (CBC) IVPB 100 mL 100 mL, 150 mg, Intravenous, Once, Jeanette Rubio MD    palonosetron (ALOXI) injection 0.25 mg, 0.25 mg, Intravenous, Once, Jeanette Rubio MD    sodium chloride 0.9 % infusion, 20 mL/hr, Intravenous, Once, Jeanette Rubio MD    Allergies   Allergen Reactions    Penicillins Anaphylaxis       Family History   Problem Relation Age of Onset    Lung cancer Mother          at age 58    Lung cancer Brother          at age 40       Cancer-related family history includes Lung cancer in his brother and mother.    Social History     Tobacco Use    Smoking status: Former     Packs/day: 1.00     Years: 15.00     Additional pack years: 0.00     Total pack years: 15.00     Types: Cigarettes     Quit date: 10/2023     Years since quittin.1     Passive exposure: Current    Smokeless tobacco: Current   Vaping Use    Vaping  "Use: Never used   Substance Use Topics    Alcohol use: Never    Drug use: Never     Social History     Social History Narrative    Not on file        Objective:  Vital signs:  Vitals:    11/20/23 0836   BP: 112/74   Pulse: 118   Resp: 16   Temp: 97.5 °F (36.4 °C)   SpO2: 94%   Weight: 65.3 kg (144 lb)   Height: 175.3 cm (69\")   PainSc: 0-No pain       Body mass index is 21.27 kg/m².  ECOG  (1) Restricted in physically strenuous activity, ambulatory and able to do work of light nature    Physical Exam:   Physical Exam  Constitutional:       Appearance: Normal appearance.   HENT:      Head: Normocephalic and atraumatic.   Eyes:      Pupils: Pupils are equal, round, and reactive to light.   Cardiovascular:      Rate and Rhythm: Normal rate and regular rhythm.      Pulses: Normal pulses.      Heart sounds: No murmur heard.  Pulmonary:      Effort: Pulmonary effort is normal.      Breath sounds: Normal breath sounds.   Abdominal:      General: There is no distension.      Palpations: Abdomen is soft. There is no mass.      Tenderness: There is no abdominal tenderness.   Musculoskeletal:         General: Normal range of motion.      Cervical back: Normal range of motion and neck supple.   Skin:     General: Skin is warm.   Neurological:      General: No focal deficit present.      Mental Status: He is alert.   Psychiatric:         Mood and Affect: Mood normal.         Lab Results - Last 18 Months   Lab Units 11/20/23  0815 11/17/23  0953 11/16/23  0942   WBC 10*3/mm3 5.98 9.49 3.44   HEMOGLOBIN g/dL 9.7* 9.2* 8.9*   HEMATOCRIT % 30.8* 29.7* 30.4*   PLATELETS 10*3/mm3 255 143 99*   MCV fL 94.2 94.0 97.4*     Lab Results - Last 18 Months   Lab Units 11/20/23  0829 10/31/23  0836 10/31/23  0826 10/22/23  0402 10/21/23  1212 10/20/23  1459   SODIUM mmol/L  --   --  137 136 135* 132*   POTASSIUM mmol/L  --   --  3.8 4.7 3.5 3.4*   CHLORIDE mmol/L  --   --  100 104 100 93*   CO2 mmol/L  --   --  24.0 22.0 23.0 24.0   BUN mg/dL  " "--   --  24* 4* 6 8   CREATININE mg/dL 1.00 1.20 1.12 0.86 0.95 1.13   CALCIUM mg/dL  --   --  9.7 8.5* 8.7 9.6   BILIRUBIN mg/dL  --   --  0.3  --   --  0.4   ALK PHOS U/L  --   --  93  --   --  158*   ALT (SGPT) U/L  --   --  117*  --   --  25   AST (SGOT) U/L  --   --  66*  --   --  49*   GLUCOSE mg/dL  --   --  145* 94 89 108*       Lab Results   Component Value Date    GLUCOSE 145 (H) 10/31/2023    BUN 24 (H) 10/31/2023    CREATININE 1.00 11/20/2023    BCR 21.4 10/31/2023    K 3.8 10/31/2023    CO2 24.0 10/31/2023    CALCIUM 9.7 10/31/2023    ALBUMIN 3.5 10/31/2023    AST 66 (H) 10/31/2023     (H) 10/31/2023       No results for input(s): \"APTT\", \"INR\", \"PTT\" in the last 14288 hours.    Lab Results   Component Value Date    IRON 34 (L) 10/22/2023    TIBC 174 (L) 10/22/2023    FERRITIN 546.30 (H) 10/20/2023       Lab Results   Component Value Date    FOLATE <2.00 (L) 10/21/2023       No results found for: \"OCCULTBLD\"    No results found for: \"RETICCTPCT\"  Lab Results   Component Value Date    [id number] 276 10/21/2023     No results found for: \"SPEP\", \"UPEP\"  No results found for: \"LDH\", \"URICACID\"  No results found for: \"ALMA\", \"RF\", \"SEDRATE\"  No results found for: \"FIBRINOGEN\", \"HAPTOGLOBIN\"  No results found for: \"PTT\", \"INR\"  No results found for: \"\"  No results found for: \"CEA\"  No components found for: \"CA-19-9\"  No results found for: \"PSA\"  No results found for: \"SEDRATE\"    Assessment & Plan       Patient is a 58-year-old male with chronic smoking history who presents with cough, shortness of breath CT imaging concerning for a large right upper lobe mass mediastinal adenopathy biopsy positive for extensive stage small cell lung cancer     Extensive stage small cell lung cancer  Given osseous metastasis, brain metastasis extensive stage small cell lung cancer  Patient is not having any symptoms specific to his brain metastasis.  Holding off on SRS to the brain lesion  We will plan on starting " chemoimmunotherapy with carboplatin etoposide and Tecentriq  Patient is agreeable discussed side effects in detail with the patient  Tolerated c1 well with some fatigue. No significant nausea  Continue treatment today.     Anemia  Normocytic anemia  Iron level is low however ferritin is high which could be an acute phase reactant  Monitor CBC obtain complete iron profile including iron saturation B12 and folate levels  Low folate, start replacement.  Continue same       Thank you very much for providing the opportunity to participate in this patient’s care. Please do not hesitate to call if there are any other questions.

## 2023-11-19 LAB
MYCOBACTERIUM SPEC CULT: NORMAL
NIGHT BLUE STAIN TISS: NORMAL

## 2023-11-20 ENCOUNTER — HOSPITAL ENCOUNTER (OUTPATIENT)
Dept: ONCOLOGY | Facility: HOSPITAL | Age: 58
Discharge: HOME | End: 2023-11-20
Admitting: INTERNAL MEDICINE
Payer: COMMERCIAL

## 2023-11-20 ENCOUNTER — OFFICE VISIT (OUTPATIENT)
Dept: ONCOLOGY | Facility: CLINIC | Age: 58
End: 2023-11-20
Payer: COMMERCIAL

## 2023-11-20 VITALS
SYSTOLIC BLOOD PRESSURE: 112 MMHG | HEART RATE: 118 BPM | DIASTOLIC BLOOD PRESSURE: 74 MMHG | OXYGEN SATURATION: 94 % | RESPIRATION RATE: 16 BRPM | HEIGHT: 69 IN | WEIGHT: 144 LBS | TEMPERATURE: 97.5 F | BODY MASS INDEX: 21.33 KG/M2

## 2023-11-20 VITALS
HEART RATE: 118 BPM | RESPIRATION RATE: 16 BRPM | BODY MASS INDEX: 21.33 KG/M2 | WEIGHT: 144 LBS | OXYGEN SATURATION: 94 % | SYSTOLIC BLOOD PRESSURE: 112 MMHG | TEMPERATURE: 97.5 F | DIASTOLIC BLOOD PRESSURE: 74 MMHG | HEIGHT: 69 IN

## 2023-11-20 DIAGNOSIS — C79.31 METASTASIS TO BRAIN: ICD-10-CM

## 2023-11-20 DIAGNOSIS — C34.90 SMALL CELL LUNG CANCER: Primary | ICD-10-CM

## 2023-11-20 DIAGNOSIS — C34.90 SMALL CELL LUNG CANCER: ICD-10-CM

## 2023-11-20 DIAGNOSIS — C34.11 MALIGNANT NEOPLASM OF UPPER LOBE, RIGHT BRONCHUS OR LUNG: ICD-10-CM

## 2023-11-20 LAB
ALBUMIN SERPL-MCNC: 3.5 G/DL (ref 3.5–5.2)
ALBUMIN/GLOB SERPL: 1.1 G/DL
ALP SERPL-CCNC: 86 U/L (ref 39–117)
ALT SERPL W P-5'-P-CCNC: 16 U/L (ref 1–41)
ANION GAP SERPL CALCULATED.3IONS-SCNC: 12 MMOL/L (ref 5–15)
AST SERPL-CCNC: 17 U/L (ref 1–40)
BASOPHILS # BLD AUTO: 0.02 10*3/MM3 (ref 0–0.2)
BASOPHILS NFR BLD AUTO: 0.3 % (ref 0–1.5)
BILIRUB SERPL-MCNC: 0.2 MG/DL (ref 0–1.2)
BUN SERPL-MCNC: 7 MG/DL (ref 6–20)
BUN/CREAT SERPL: 7.4 (ref 7–25)
CALCIUM SPEC-SCNC: 9.6 MG/DL (ref 8.6–10.5)
CHLORIDE SERPL-SCNC: 101 MMOL/L (ref 98–107)
CO2 SERPL-SCNC: 24 MMOL/L (ref 22–29)
CREAT BLDA-MCNC: 1 MG/DL (ref 0.6–1.3)
CREAT SERPL-MCNC: 0.95 MG/DL (ref 0.76–1.27)
DEPRECATED RDW RBC AUTO: 63.1 FL (ref 37–54)
EGFRCR SERPLBLD CKD-EPI 2021: 86.7 ML/MIN/1.73
EGFRCR SERPLBLD CKD-EPI 2021: 92.2 ML/MIN/1.73
EOSINOPHIL # BLD AUTO: 0.03 10*3/MM3 (ref 0–0.4)
EOSINOPHIL NFR BLD AUTO: 0.5 % (ref 0.3–6.2)
ERYTHROCYTE [DISTWIDTH] IN BLOOD BY AUTOMATED COUNT: 20.8 % (ref 12.3–15.4)
GLOBULIN UR ELPH-MCNC: 3.1 GM/DL
GLUCOSE BLDC GLUCOMTR-MCNC: 189 MG/DL (ref 70–105)
GLUCOSE SERPL-MCNC: 192 MG/DL (ref 65–99)
HCT VFR BLD AUTO: 30.8 % (ref 37.5–51)
HGB BLD-MCNC: 9.7 G/DL (ref 13–17.7)
LYMPHOCYTES # BLD AUTO: 1.76 10*3/MM3 (ref 0.7–3.1)
LYMPHOCYTES NFR BLD AUTO: 29.4 % (ref 19.6–45.3)
MCH RBC QN AUTO: 29.7 PG (ref 26.6–33)
MCHC RBC AUTO-ENTMCNC: 31.5 G/DL (ref 31.5–35.7)
MCV RBC AUTO: 94.2 FL (ref 79–97)
MONOCYTES # BLD AUTO: 1.08 10*3/MM3 (ref 0.1–0.9)
MONOCYTES NFR BLD AUTO: 18.1 % (ref 5–12)
NEUTROPHILS NFR BLD AUTO: 3.09 10*3/MM3 (ref 1.7–7)
NEUTROPHILS NFR BLD AUTO: 51.7 % (ref 42.7–76)
PLATELET # BLD AUTO: 255 10*3/MM3 (ref 140–450)
PMV BLD AUTO: 9.2 FL (ref 6–12)
POTASSIUM SERPL-SCNC: 4 MMOL/L (ref 3.5–5.2)
PROT SERPL-MCNC: 6.6 G/DL (ref 6–8.5)
RBC # BLD AUTO: 3.27 10*6/MM3 (ref 4.14–5.8)
SODIUM SERPL-SCNC: 137 MMOL/L (ref 136–145)
WBC NRBC COR # BLD AUTO: 5.98 10*3/MM3 (ref 3.4–10.8)

## 2023-11-20 PROCEDURE — 80053 COMPREHEN METABOLIC PANEL: CPT | Performed by: INTERNAL MEDICINE

## 2023-11-20 PROCEDURE — 25010000002 ATEZOLIZUMAB 1200 MG/20ML SOLUTION 20 ML VIAL: Performed by: INTERNAL MEDICINE

## 2023-11-20 PROCEDURE — 96375 TX/PRO/DX INJ NEW DRUG ADDON: CPT

## 2023-11-20 PROCEDURE — 25010000002 FOSAPREPITANT PER 1 MG: Performed by: INTERNAL MEDICINE

## 2023-11-20 PROCEDURE — 25010000002 CARBOPLATIN PER 50 MG: Performed by: INTERNAL MEDICINE

## 2023-11-20 PROCEDURE — 82948 REAGENT STRIP/BLOOD GLUCOSE: CPT

## 2023-11-20 PROCEDURE — 96367 TX/PROPH/DG ADDL SEQ IV INF: CPT

## 2023-11-20 PROCEDURE — 25810000003 SODIUM CHLORIDE 0.9 % SOLUTION 250 ML FLEX CONT: Performed by: INTERNAL MEDICINE

## 2023-11-20 PROCEDURE — 25010000002 DEXAMETHASONE SODIUM PHOSPHATE 120 MG/30ML SOLUTION: Performed by: INTERNAL MEDICINE

## 2023-11-20 PROCEDURE — 99214 OFFICE O/P EST MOD 30 MIN: CPT | Performed by: INTERNAL MEDICINE

## 2023-11-20 PROCEDURE — 85025 COMPLETE CBC W/AUTO DIFF WBC: CPT | Performed by: INTERNAL MEDICINE

## 2023-11-20 PROCEDURE — 25810000003 SODIUM CHLORIDE 0.9 % SOLUTION 500 ML FLEX CONT: Performed by: INTERNAL MEDICINE

## 2023-11-20 PROCEDURE — 82565 ASSAY OF CREATININE: CPT

## 2023-11-20 PROCEDURE — 25010000002 PALONOSETRON 0.25 MG/5ML SOLUTION PREFILLED SYRINGE: Performed by: INTERNAL MEDICINE

## 2023-11-20 PROCEDURE — 25010000002 ETOPOSIDE 1 GM/50ML SOLUTION 50 ML VIAL: Performed by: INTERNAL MEDICINE

## 2023-11-20 PROCEDURE — 96413 CHEMO IV INFUSION 1 HR: CPT

## 2023-11-20 PROCEDURE — 25810000003 SODIUM CHLORIDE 0.9 % SOLUTION: Performed by: INTERNAL MEDICINE

## 2023-11-20 PROCEDURE — 96417 CHEMO IV INFUS EACH ADDL SEQ: CPT

## 2023-11-20 RX ORDER — OLANZAPINE 5 MG/1
5 TABLET ORAL NIGHTLY
Qty: 4 TABLET | Refills: 3 | Status: SHIPPED | OUTPATIENT
Start: 2023-11-20

## 2023-11-20 RX ORDER — SODIUM CHLORIDE 9 MG/ML
20 INJECTION, SOLUTION INTRAVENOUS ONCE
Status: COMPLETED | OUTPATIENT
Start: 2023-11-20 | End: 2023-11-20

## 2023-11-20 RX ORDER — SODIUM CHLORIDE 9 MG/ML
20 INJECTION, SOLUTION INTRAVENOUS ONCE
Status: CANCELLED | OUTPATIENT
Start: 2023-11-21

## 2023-11-20 RX ORDER — SODIUM CHLORIDE 9 MG/ML
20 INJECTION, SOLUTION INTRAVENOUS ONCE
Status: CANCELLED | OUTPATIENT
Start: 2023-11-22

## 2023-11-20 RX ORDER — PALONOSETRON 0.05 MG/ML
0.25 INJECTION, SOLUTION INTRAVENOUS ONCE
Status: COMPLETED | OUTPATIENT
Start: 2023-11-20 | End: 2023-11-20

## 2023-11-20 RX ORDER — DIPHENHYDRAMINE HYDROCHLORIDE 50 MG/ML
50 INJECTION INTRAMUSCULAR; INTRAVENOUS AS NEEDED
Status: CANCELLED | OUTPATIENT
Start: 2023-11-20

## 2023-11-20 RX ORDER — FAMOTIDINE 10 MG/ML
20 INJECTION, SOLUTION INTRAVENOUS AS NEEDED
Status: CANCELLED | OUTPATIENT
Start: 2023-11-20

## 2023-11-20 RX ADMIN — ETOPOSIDE 180 MG: 20 INJECTION INTRAVENOUS at 11:58

## 2023-11-20 RX ADMIN — SODIUM CHLORIDE 100 ML: 9 INJECTION, SOLUTION INTRAVENOUS at 10:03

## 2023-11-20 RX ADMIN — ATEZOLIZUMAB 1200 MG: 1200 INJECTION, SOLUTION INTRAVENOUS at 09:17

## 2023-11-20 RX ADMIN — SODIUM CHLORIDE 20 ML/HR: 9 INJECTION, SOLUTION INTRAVENOUS at 09:17

## 2023-11-20 RX ADMIN — CARBOPLATIN 490 MG: 600 INJECTION, SOLUTION INTRAVENOUS at 11:19

## 2023-11-20 RX ADMIN — DEXAMETHASONE SODIUM PHOSPHATE 12 MG: 4 INJECTION, SOLUTION INTRA-ARTICULAR; INTRALESIONAL; INTRAMUSCULAR; INTRAVENOUS; SOFT TISSUE at 10:48

## 2023-11-20 RX ADMIN — PALONOSETRON 0.25 MG: 0.25 INJECTION, SOLUTION INTRAVENOUS at 10:00

## 2023-11-21 ENCOUNTER — HOSPITAL ENCOUNTER (OUTPATIENT)
Dept: ONCOLOGY | Facility: HOSPITAL | Age: 58
Discharge: HOME | End: 2023-11-21
Admitting: INTERNAL MEDICINE
Payer: COMMERCIAL

## 2023-11-21 VITALS
RESPIRATION RATE: 18 BRPM | HEART RATE: 108 BPM | OXYGEN SATURATION: 99 % | SYSTOLIC BLOOD PRESSURE: 110 MMHG | DIASTOLIC BLOOD PRESSURE: 74 MMHG | TEMPERATURE: 97.3 F | BODY MASS INDEX: 21.48 KG/M2 | WEIGHT: 145 LBS | HEIGHT: 69 IN

## 2023-11-21 DIAGNOSIS — C34.90 SMALL CELL LUNG CANCER: Primary | ICD-10-CM

## 2023-11-21 PROCEDURE — 96413 CHEMO IV INFUSION 1 HR: CPT

## 2023-11-21 PROCEDURE — 96375 TX/PRO/DX INJ NEW DRUG ADDON: CPT

## 2023-11-21 PROCEDURE — 25810000003 SODIUM CHLORIDE 0.9 % SOLUTION: Performed by: INTERNAL MEDICINE

## 2023-11-21 PROCEDURE — 25010000002 ETOPOSIDE 500 MG/25ML SOLUTION 25 ML VIAL: Performed by: INTERNAL MEDICINE

## 2023-11-21 PROCEDURE — 96367 TX/PROPH/DG ADDL SEQ IV INF: CPT

## 2023-11-21 PROCEDURE — 25810000003 SODIUM CHLORIDE 0.9 % SOLUTION 500 ML FLEX CONT: Performed by: INTERNAL MEDICINE

## 2023-11-21 PROCEDURE — 25010000002 DEXAMETHASONE SODIUM PHOSPHATE 120 MG/30ML SOLUTION: Performed by: INTERNAL MEDICINE

## 2023-11-21 RX ORDER — LORAZEPAM 1 MG/1
1 TABLET ORAL ONCE
Qty: 1 TABLET | Refills: 0 | Status: SHIPPED | OUTPATIENT
Start: 2023-11-21 | End: 2023-11-21

## 2023-11-21 RX ORDER — SODIUM CHLORIDE 9 MG/ML
20 INJECTION, SOLUTION INTRAVENOUS ONCE
Status: COMPLETED | OUTPATIENT
Start: 2023-11-21 | End: 2023-11-21

## 2023-11-21 RX ADMIN — SODIUM CHLORIDE 20 ML/HR: 9 INJECTION, SOLUTION INTRAVENOUS at 11:10

## 2023-11-21 RX ADMIN — DEXAMETHASONE SODIUM PHOSPHATE 12 MG: 4 INJECTION, SOLUTION INTRA-ARTICULAR; INTRALESIONAL; INTRAMUSCULAR; INTRAVENOUS; SOFT TISSUE at 11:09

## 2023-11-21 RX ADMIN — ETOPOSIDE 180 MG: 20 INJECTION INTRAVENOUS at 11:36

## 2023-11-21 NOTE — PROGRESS NOTES
Patient came in for D2 Etoposide without complaints other than nausea that is controlled with zofran. Treatment given and tolerated. Patient going home with IV for tomorrow. Dr. Rubio educated patient on need to get injection on Thursday. Patient verbalized understanding that his injection would be given Thursday at [facility].

## 2023-11-22 ENCOUNTER — HOSPITAL ENCOUNTER (OUTPATIENT)
Dept: ONCOLOGY | Facility: HOSPITAL | Age: 58
Discharge: HOME | End: 2023-11-22
Admitting: INTERNAL MEDICINE
Payer: COMMERCIAL

## 2023-11-22 VITALS
HEART RATE: 105 BPM | RESPIRATION RATE: 18 BRPM | TEMPERATURE: 97.5 F | DIASTOLIC BLOOD PRESSURE: 68 MMHG | BODY MASS INDEX: 21.71 KG/M2 | OXYGEN SATURATION: 97 % | SYSTOLIC BLOOD PRESSURE: 113 MMHG | WEIGHT: 147 LBS

## 2023-11-22 DIAGNOSIS — R11.2 CHEMOTHERAPY INDUCED NAUSEA AND VOMITING: ICD-10-CM

## 2023-11-22 DIAGNOSIS — C34.90 SMALL CELL LUNG CANCER: Primary | ICD-10-CM

## 2023-11-22 DIAGNOSIS — T45.1X5A CHEMOTHERAPY INDUCED NAUSEA AND VOMITING: ICD-10-CM

## 2023-11-22 PROCEDURE — 25810000003 SODIUM CHLORIDE 0.9 % SOLUTION: Performed by: INTERNAL MEDICINE

## 2023-11-22 PROCEDURE — 96367 TX/PROPH/DG ADDL SEQ IV INF: CPT

## 2023-11-22 PROCEDURE — 25010000002 DEXAMETHASONE SODIUM PHOSPHATE 120 MG/30ML SOLUTION: Performed by: INTERNAL MEDICINE

## 2023-11-22 PROCEDURE — 96375 TX/PRO/DX INJ NEW DRUG ADDON: CPT

## 2023-11-22 PROCEDURE — 25010000002 ETOPOSIDE 500 MG/25ML SOLUTION 25 ML VIAL: Performed by: INTERNAL MEDICINE

## 2023-11-22 PROCEDURE — 25810000003 SODIUM CHLORIDE 0.9 % SOLUTION 500 ML FLEX CONT: Performed by: INTERNAL MEDICINE

## 2023-11-22 PROCEDURE — 96413 CHEMO IV INFUSION 1 HR: CPT

## 2023-11-22 RX ORDER — PROMETHAZINE HYDROCHLORIDE 25 MG/1
25 TABLET ORAL EVERY 8 HOURS PRN
Qty: 90 TABLET | Refills: 0 | Status: SHIPPED | OUTPATIENT
Start: 2023-11-22 | End: 2024-02-20

## 2023-11-22 RX ORDER — SODIUM CHLORIDE 9 MG/ML
20 INJECTION, SOLUTION INTRAVENOUS ONCE
Status: COMPLETED | OUTPATIENT
Start: 2023-11-22 | End: 2023-11-22

## 2023-11-22 RX ADMIN — SODIUM CHLORIDE 20 ML/HR: 9 INJECTION, SOLUTION INTRAVENOUS at 11:31

## 2023-11-22 RX ADMIN — ETOPOSIDE 180 MG: 20 INJECTION INTRAVENOUS at 11:53

## 2023-11-22 RX ADMIN — DEXAMETHASONE SODIUM PHOSPHATE 12 MG: 4 INJECTION, SOLUTION INTRA-ARTICULAR; INTRALESIONAL; INTRAMUSCULAR; INTRAVENOUS; SOFT TISSUE at 11:31

## 2023-11-23 ENCOUNTER — HOSPITAL ENCOUNTER (OUTPATIENT)
Dept: INFUSION THERAPY | Facility: HOSPITAL | Age: 58
Discharge: HOME | End: 2023-11-23
Admitting: INTERNAL MEDICINE
Payer: COMMERCIAL

## 2023-11-23 VITALS
SYSTOLIC BLOOD PRESSURE: 118 MMHG | OXYGEN SATURATION: 97 % | TEMPERATURE: 98.2 F | DIASTOLIC BLOOD PRESSURE: 72 MMHG | HEART RATE: 94 BPM | RESPIRATION RATE: 16 BRPM

## 2023-11-23 DIAGNOSIS — C34.90 SMALL CELL LUNG CANCER: Primary | ICD-10-CM

## 2023-11-23 PROCEDURE — 96372 THER/PROPH/DIAG INJ SC/IM: CPT

## 2023-11-23 PROCEDURE — 25010000002 PEGFILGRASTIM-JMDB 6 MG/0.6ML SOLUTION PREFILLED SYRINGE: Performed by: INTERNAL MEDICINE

## 2023-11-23 RX ADMIN — PEGFILGRASTIM 6 MG: 6 INJECTION SUBCUTANEOUS at 10:05

## 2023-11-23 NOTE — PROGRESS NOTES
[facility] Hospital Medicine  Discharge Summary      Patient Name: Chucho Prado  MRN: [id number]  Admission Date: 1/7/2017  Hospital Length of Stay: 2 days  Discharge Date and Time: 1/9/2017 12:50 PM  Attending Physician: No att. providers found   Discharging Provider: Howard Corbin MD  Primary Care Provider: Obed Mcguire MD  [facility] Medicine Team: [facility] [place] Howard Corbin MD    HPI:    admitted to inpatient rehabilitation on 12/30/2016 for gait instability following compression fractures with impaired mobility and ADLs. Patient remains appropriate for PT, OT, and as required Speech therapy. Patient continues to require 24 hour nursing care as well as daily Physician assessment. Patient sent from Rehab this morning for continued N/V and has not had BM in 5 days. Patient reports BM while waiting to be seen in the ED- and he feels much better. He now denies N/V, abdominal pain    * No surgery found *      Indwelling Lines/Drains at time of discharge:   Lines/Drains/Airways     Drain                 Urethral Catheter 12/26/16 0247  12 Fr. 17 days              Hospital Course:   Pain was relived with BM.  Was watched for one day.  Still in moderate pain but should improve with rehab.  No changes in meds made.       Consults:     Significant Diagnostic Studies: Labs:   BMP:   Recent Labs  Lab 01/11/17  0626 01/12/17  0515   GLU 96 99   * 129*   K 3.4* 3.4*   CL 90* 93*   CO2 29 28   BUN 25* 22   CREATININE 1.4 1.3   CALCIUM 8.0* 8.0*       Pending Diagnostic Studies:     None        Final Active Diagnoses:    Diagnosis Date Noted POA    PRINCIPAL PROBLEM:  Constipation by delayed colonic transit [K59.01] 01/09/2017 Yes    TERE (acute kidney injury) [N17.9] 01/07/2017 Yes    Hypothyroidism [E03.9] 07/30/2013 Yes     Chronic      Problems Resolved During this Admission:    Diagnosis Date Noted Date Resolved POA      * Constipation by delayed colonic  Patient in ASC today for Fulphila injection. Patient monitored for 15 minutes post injection. No signs or symptoms of a complication and discharged with family member.    transit  Resolved        Discharged Condition: good    Disposition: Rehab Facility    Follow Up:    Patient Instructions:   No discharge procedures on file.  Medications:  Reconciled Home Medications:   Discharge Medication List as of 1/9/2017 12:50 PM      CONTINUE these medications which have NOT CHANGED    Details   acetaminophen (TYLENOL) 500 MG tablet Take 2 tablets (1,000 mg total) by mouth 3 (three) times daily as needed for Pain., Starting 12/20/2016, Until Discontinued, No Print      aspirin 81 MG Chew Take 1 tablet (81 mg total) by mouth once daily., Starting 10/25/2016, Until Discontinued, Print      calcitonin, salmon, (FORTICAL) 200 unit/actuation nasal spray 1 spray by Nasal route once daily., Starting 12/20/2016, Until Wed 12/20/17, Print      cetirizine (ZYRTEC) 5 MG tablet Take 1 tablet (5 mg total) by mouth once daily., Starting 10/25/2016, Until Wed 10/25/17, Print      ferrous sulfate 325 (65 FE) MG EC tablet Take 1 tablet (325 mg total) by mouth every evening., Starting 10/25/2016, Until Discontinued, Print      hydrocodone-acetaminophen 10-325mg (NORCO)  mg Tab Take 1 tablet by mouth every 4 (four) hours as needed for Pain (pain)., Starting 12/20/2016, Until Discontinued, Print      levothyroxine (SYNTHROID) 50 MCG tablet Take 1 tablet (50 mcg total) by mouth once daily., Starting 11/1/2016, Until Discontinued, Print      mirtazapine (REMERON) 30 MG tablet Take 1 tablet (30 mg total) by mouth every evening., Starting 11/1/2016, Until Wed 11/1/17, Print      ondansetron (ZOFRAN ODT) 4 MG TbDL Take 2 tablets (8 mg total) by mouth every 6 (six) hours as needed (nausea)., Starting 12/20/2016, Until Discontinued, Print      simvastatin (ZOCOR) 40 MG tablet Take 1 tablet (40 mg total) by mouth every evening., Starting 11/1/2016, Until Wed 11/1/17, Print      trazodone (DESYREL) 50 MG tablet Take 1 tablet (50 mg total) by mouth every evening. Try half tablet at first, Starting 12/14/2016, Until  Discontinued, Print      triamterene-hydrochlorothiazide 37.5-25 mg (DYAZIDE) 37.5-25 mg per capsule Take 1 capsule by mouth every morning., Starting 11/9/2016, Until Thu 11/9/17, Print           Time spent on the discharge of patient: 30 minutes    Howard Corbin MD  Department of Hospital Medicine  [facility]

## 2023-11-26 LAB
MYCOBACTERIUM SPEC CULT: NORMAL
NIGHT BLUE STAIN TISS: NORMAL

## 2023-11-28 ENCOUNTER — LAB (OUTPATIENT)
Dept: LAB | Facility: HOSPITAL | Age: 58
End: 2023-11-28
Payer: COMMERCIAL

## 2023-11-28 DIAGNOSIS — D70.1 CHEMOTHERAPY INDUCED NEUTROPENIA: ICD-10-CM

## 2023-11-28 DIAGNOSIS — T45.1X5A CHEMOTHERAPY INDUCED NEUTROPENIA: ICD-10-CM

## 2023-11-28 DIAGNOSIS — C34.90 SMALL CELL LUNG CANCER: ICD-10-CM

## 2023-11-28 DIAGNOSIS — C79.31 METASTASIS TO BRAIN: ICD-10-CM

## 2023-11-28 LAB
BASOPHILS # BLD AUTO: 0.02 10*3/MM3 (ref 0–0.2)
BASOPHILS NFR BLD AUTO: 1.5 % (ref 0–1.5)
DEPRECATED RDW RBC AUTO: 65.4 FL (ref 37–54)
EOSINOPHIL # BLD AUTO: 0 10*3/MM3 (ref 0–0.4)
EOSINOPHIL NFR BLD AUTO: 0 % (ref 0.3–6.2)
ERYTHROCYTE [DISTWIDTH] IN BLOOD BY AUTOMATED COUNT: 19.8 % (ref 12.3–15.4)
HCT VFR BLD AUTO: 29.2 % (ref 37.5–51)
HGB BLD-MCNC: 9.2 G/DL (ref 13–17.7)
LYMPHOCYTES # BLD AUTO: 0.93 10*3/MM3 (ref 0.7–3.1)
LYMPHOCYTES NFR BLD AUTO: 71.5 % (ref 19.6–45.3)
MCH RBC QN AUTO: 29.3 PG (ref 26.6–33)
MCHC RBC AUTO-ENTMCNC: 31.5 G/DL (ref 31.5–35.7)
MCV RBC AUTO: 93 FL (ref 79–97)
MONOCYTES # BLD AUTO: 0.13 10*3/MM3 (ref 0.1–0.9)
MONOCYTES NFR BLD AUTO: 10 % (ref 5–12)
NEUTROPHILS NFR BLD AUTO: 0.22 10*3/MM3 (ref 1.7–7)
NEUTROPHILS NFR BLD AUTO: 17 % (ref 42.7–76)
PLATELET # BLD AUTO: 144 10*3/MM3 (ref 140–450)
PMV BLD AUTO: 8.7 FL (ref 6–12)
RBC # BLD AUTO: 3.14 10*6/MM3 (ref 4.14–5.8)
WBC NRBC COR # BLD AUTO: 1.3 10*3/MM3 (ref 3.4–10.8)

## 2023-11-28 PROCEDURE — 36415 COLL VENOUS BLD VENIPUNCTURE: CPT

## 2023-11-28 PROCEDURE — 85025 COMPLETE CBC W/AUTO DIFF WBC: CPT

## 2023-11-28 RX ORDER — LEVOFLOXACIN 750 MG/1
750 TABLET, FILM COATED ORAL DAILY
Qty: 5 TABLET | Refills: 0 | Status: SHIPPED | OUTPATIENT
Start: 2023-11-28

## 2023-11-28 NOTE — TELEPHONE ENCOUNTER
Spoke w/ Dr. Rubio regarding pt's ANC of 0.22.  Order received to have patient take Levaquin 750 mg daily for 5 days.  Script sent in to pt's pharmacy.  Pt notified and v/u.

## 2023-12-03 LAB
MYCOBACTERIUM SPEC CULT: NORMAL
NIGHT BLUE STAIN TISS: NORMAL

## 2023-12-04 ENCOUNTER — HOSPITAL ENCOUNTER (OUTPATIENT)
Dept: INTERVENTIONAL RADIOLOGY/VASCULAR | Facility: HOSPITAL | Age: 58
Discharge: HOME | End: 2023-12-04
Admitting: RADIOLOGY
Payer: COMMERCIAL

## 2023-12-04 ENCOUNTER — TELEPHONE (OUTPATIENT)
Dept: ONCOLOGY | Facility: CLINIC | Age: 58
End: 2023-12-04
Payer: COMMERCIAL

## 2023-12-04 VITALS
OXYGEN SATURATION: 97 % | WEIGHT: 147 LBS | RESPIRATION RATE: 14 BRPM | TEMPERATURE: 98.1 F | HEART RATE: 99 BPM | SYSTOLIC BLOOD PRESSURE: 129 MMHG | HEIGHT: 69 IN | DIASTOLIC BLOOD PRESSURE: 70 MMHG | BODY MASS INDEX: 21.77 KG/M2

## 2023-12-04 DIAGNOSIS — C34.90 SMALL CELL LUNG CANCER: ICD-10-CM

## 2023-12-04 LAB
APTT PPP: 25.8 SECONDS (ref 24–31)
BLASTS NFR BLD MANUAL: 2 % (ref 0–0)
C3 FRG RBC-MCNC: ABNORMAL
DACRYOCYTES BLD QL SMEAR: ABNORMAL
DEPRECATED RDW RBC AUTO: 66.5 FL (ref 37–54)
ERYTHROCYTE [DISTWIDTH] IN BLOOD BY AUTOMATED COUNT: 22 % (ref 12.3–15.4)
HCT VFR BLD AUTO: 29.5 % (ref 37.5–51)
HGB BLD-MCNC: 9.6 G/DL (ref 13–17.7)
INR PPP: 0.99 (ref 0.93–1.1)
LYMPHOCYTES # BLD MANUAL: 1.94 10*3/MM3 (ref 0.7–3.1)
LYMPHOCYTES NFR BLD MANUAL: 4 % (ref 5–12)
MCH RBC QN AUTO: 29.2 PG (ref 26.6–33)
MCHC RBC AUTO-ENTMCNC: 32.7 G/DL (ref 31.5–35.7)
MCV RBC AUTO: 89.3 FL (ref 79–97)
METAMYELOCYTES NFR BLD MANUAL: 10 % (ref 0–0)
MONOCYTES # BLD: 0.52 10*3/MM3 (ref 0.1–0.9)
MRSA DNA SPEC QL NAA+PROBE: NORMAL
MYELOCYTES NFR BLD MANUAL: 10 % (ref 0–0)
NEUTROPHILS # BLD AUTO: 7.48 10*3/MM3 (ref 1.7–7)
NEUTROPHILS NFR BLD MANUAL: 43 % (ref 42.7–76)
NEUTS BAND NFR BLD MANUAL: 15 % (ref 0–5)
NRBC SPEC MANUAL: 1 /100 WBC (ref 0–0.2)
PATHOLOGY REVIEW: YES
PLATELET # BLD AUTO: 60 10*3/MM3 (ref 140–450)
PMV BLD AUTO: 8.2 FL (ref 6–12)
POIKILOCYTOSIS BLD QL SMEAR: ABNORMAL
POLYCHROMASIA BLD QL SMEAR: ABNORMAL
PROMYELOCYTES NFR BLD MANUAL: 1 % (ref 0–0)
PROTHROMBIN TIME: 10.8 SECONDS (ref 9.6–11.7)
RBC # BLD AUTO: 3.3 10*6/MM3 (ref 4.14–5.8)
SCAN SLIDE: NORMAL
SMALL PLATELETS BLD QL SMEAR: ABNORMAL
VARIANT LYMPHS NFR BLD MANUAL: 1 % (ref 0–5)
VARIANT LYMPHS NFR BLD MANUAL: 14 % (ref 19.6–45.3)
WBC MORPH BLD: NORMAL
WBC NRBC COR # BLD AUTO: 12.9 10*3/MM3 (ref 3.4–10.8)

## 2023-12-04 PROCEDURE — C1788 PORT, INDWELLING, IMP: HCPCS

## 2023-12-04 PROCEDURE — 77001 FLUOROGUIDE FOR VEIN DEVICE: CPT

## 2023-12-04 PROCEDURE — C1769 GUIDE WIRE: HCPCS

## 2023-12-04 PROCEDURE — 85610 PROTHROMBIN TIME: CPT | Performed by: RADIOLOGY

## 2023-12-04 PROCEDURE — 25010000002 MIDAZOLAM PER 1 MG: Performed by: RADIOLOGY

## 2023-12-04 PROCEDURE — 99153 MOD SED SAME PHYS/QHP EA: CPT

## 2023-12-04 PROCEDURE — 76937 US GUIDE VASCULAR ACCESS: CPT

## 2023-12-04 PROCEDURE — 25010000002 FENTANYL CITRATE (PF) 50 MCG/ML SOLUTION: Performed by: RADIOLOGY

## 2023-12-04 PROCEDURE — 25010000002 LIDOCAINE 1 % SOLUTION: Performed by: RADIOLOGY

## 2023-12-04 PROCEDURE — 25010000002 HEPARIN LOCK FLUSH PER 10 UNITS: Performed by: RADIOLOGY

## 2023-12-04 PROCEDURE — 87641 MR-STAPH DNA AMP PROBE: CPT | Performed by: INTERNAL MEDICINE

## 2023-12-04 PROCEDURE — 85025 COMPLETE CBC W/AUTO DIFF WBC: CPT | Performed by: RADIOLOGY

## 2023-12-04 PROCEDURE — 25010000002 CEFAZOLIN PER 500 MG: Performed by: RADIOLOGY

## 2023-12-04 PROCEDURE — 85007 BL SMEAR W/DIFF WBC COUNT: CPT | Performed by: RADIOLOGY

## 2023-12-04 PROCEDURE — C1894 INTRO/SHEATH, NON-LASER: HCPCS

## 2023-12-04 PROCEDURE — 85730 THROMBOPLASTIN TIME PARTIAL: CPT | Performed by: RADIOLOGY

## 2023-12-04 PROCEDURE — 99152 MOD SED SAME PHYS/QHP 5/>YRS: CPT

## 2023-12-04 RX ORDER — FENTANYL CITRATE 50 UG/ML
INJECTION, SOLUTION INTRAMUSCULAR; INTRAVENOUS AS NEEDED
Status: COMPLETED | OUTPATIENT
Start: 2023-12-04 | End: 2023-12-04

## 2023-12-04 RX ORDER — SODIUM CHLORIDE 0.9 % (FLUSH) 0.9 %
10 SYRINGE (ML) INJECTION AS NEEDED
Status: DISCONTINUED | OUTPATIENT
Start: 2023-12-04 | End: 2023-12-05 | Stop reason: HOSPADM

## 2023-12-04 RX ORDER — SODIUM CHLORIDE 0.9 % (FLUSH) 0.9 %
10 SYRINGE (ML) INJECTION EVERY 12 HOURS SCHEDULED
Status: DISCONTINUED | OUTPATIENT
Start: 2023-12-04 | End: 2023-12-05 | Stop reason: HOSPADM

## 2023-12-04 RX ORDER — LIDOCAINE AND PRILOCAINE 25; 25 MG/G; MG/G
1 CREAM TOPICAL SEE ADMIN INSTRUCTIONS
Qty: 30 G | Refills: 3 | Status: SHIPPED | OUTPATIENT
Start: 2023-12-04

## 2023-12-04 RX ORDER — HEPARIN SODIUM (PORCINE) LOCK FLUSH IV SOLN 100 UNIT/ML 100 UNIT/ML
SOLUTION INTRAVENOUS AS NEEDED
Status: COMPLETED | OUTPATIENT
Start: 2023-12-04 | End: 2023-12-04

## 2023-12-04 RX ORDER — MIDAZOLAM HYDROCHLORIDE 1 MG/ML
INJECTION INTRAMUSCULAR; INTRAVENOUS AS NEEDED
Status: COMPLETED | OUTPATIENT
Start: 2023-12-04 | End: 2023-12-04

## 2023-12-04 RX ORDER — SODIUM CHLORIDE 9 MG/ML
75 INJECTION, SOLUTION INTRAVENOUS CONTINUOUS
Status: DISCONTINUED | OUTPATIENT
Start: 2023-12-04 | End: 2023-12-05 | Stop reason: HOSPADM

## 2023-12-04 RX ORDER — LIDOCAINE HYDROCHLORIDE 10 MG/ML
INJECTION, SOLUTION INFILTRATION; PERINEURAL AS NEEDED
Status: COMPLETED | OUTPATIENT
Start: 2023-12-04 | End: 2023-12-04

## 2023-12-04 RX ORDER — LIDOCAINE HYDROCHLORIDE AND EPINEPHRINE 10; 10 MG/ML; UG/ML
INJECTION, SOLUTION INFILTRATION; PERINEURAL AS NEEDED
Status: COMPLETED | OUTPATIENT
Start: 2023-12-04 | End: 2023-12-04

## 2023-12-04 RX ADMIN — FENTANYL CITRATE 50 MCG: 50 INJECTION, SOLUTION INTRAMUSCULAR; INTRAVENOUS at 09:45

## 2023-12-04 RX ADMIN — Medication 500 UNITS: at 09:56

## 2023-12-04 RX ADMIN — FENTANYL CITRATE 100 MCG: 50 INJECTION, SOLUTION INTRAMUSCULAR; INTRAVENOUS at 09:33

## 2023-12-04 RX ADMIN — LIDOCAINE HYDROCHLORIDE 8 ML: 10 INJECTION, SOLUTION INFILTRATION; PERINEURAL at 09:44

## 2023-12-04 RX ADMIN — Medication 10 ML: at 09:10

## 2023-12-04 RX ADMIN — LIDOCAINE HYDROCHLORIDE,EPINEPHRINE BITARTRATE 8 ML: 10; .01 INJECTION, SOLUTION INFILTRATION; PERINEURAL at 09:47

## 2023-12-04 RX ADMIN — MIDAZOLAM 0.5 MG: 1 INJECTION INTRAMUSCULAR; INTRAVENOUS at 09:38

## 2023-12-04 RX ADMIN — FENTANYL CITRATE 50 MCG: 50 INJECTION, SOLUTION INTRAMUSCULAR; INTRAVENOUS at 09:38

## 2023-12-04 RX ADMIN — MIDAZOLAM 1 MG: 1 INJECTION INTRAMUSCULAR; INTRAVENOUS at 09:33

## 2023-12-04 RX ADMIN — CEFAZOLIN 1000 MG: 1 INJECTION, POWDER, FOR SOLUTION INTRAMUSCULAR; INTRAVENOUS at 09:09

## 2023-12-04 RX ADMIN — MIDAZOLAM 0.5 MG: 1 INJECTION INTRAMUSCULAR; INTRAVENOUS at 09:45

## 2023-12-04 NOTE — NURSING NOTE
Pt discharged with brother after instructions given and questions answered.  Tranported via wheelchair to car and assisted into car without incidence.

## 2023-12-04 NOTE — TELEPHONE ENCOUNTER
Caller: Suhas Maciel    Relationship: Emergency Contact    Best call back number:     [phone]     What is the best time to reach you: ANYTIME     Who are you requesting to speak with (clinical staff, provider,  specific staff member): CLINICAL    What was the call regarding: PT HAD PORT PUT IN THIS MORNING AND THEY HAVE BEEN ADVISED TO GET A RX FOR SOME NUMBING CREAM. PLEASE SEND CREAM TO..    [id number] - [facility] [place] - [address] - [phone] [facility] [phone] FX

## 2023-12-04 NOTE — PRE-PROCEDURE NOTE
.  [facility]   Interventional Radiology H&P    Patient Name: Romero Maciel  : 1965  MRN: [id number]  Primary Care Physician:  Jennifer Maynard APRN  Referring Physician: Jeanette Rubio MD  Date of admission: 2023    Subjective   Subjective     HPI:  Romero Maciel is a 58 y.o. male with lung CA    Review of Systems:   Constitutional no fever,  no weight loss       Otolaryngeal no difficulty swallowing   Cardiovascular no chest pain   Pulmonary no cough, no sputum production   Gastrointestinal no constipation, no diarrhea                         Personal History       Past Medical/Surgical History:   Past Medical History:   Diagnosis Date    Recurrent dislocation of knee 10/24/2023     Past Surgical History:   Procedure Laterality Date    BRONCHOSCOPY N/A 10/22/2023    Procedure: BRONCHOSCOPY WITH CRYO BIOPSY X1 AREA,  FINE NEEDLE ASPIRATION, AND BRONCHOALVEOLAR LAVAGE;  Surgeon: Genia Jenkins MD;  Location: [facility] ENDOSCOPY;  Service: Pulmonary;  Laterality: N/A;  POST: LUNG MASS       Social History:  reports that he quit smoking about 2 months ago. His smoking use included cigarettes. He has a 15.00 pack-year smoking history. He has been exposed to tobacco smoke. He uses smokeless tobacco. He reports that he does not drink alcohol and does not use drugs.    Medications:  (Not in a hospital admission)    Current medications:  sodium chloride, 10 mL, Intravenous, Q12H      Current IV drips:  ceFAZolin, , Last Rate: 1,000 mg (23)  sodium chloride, 75 mL/hr, Last Rate: 75 mL/hr (23)        Allergies:  Allergies   Allergen Reactions    Penicillins Anaphylaxis       Objective    Objective     Vitals:   Temp:  [98.1 °F (36.7 °C)] 98.1 °F (36.7 °C)  Heart Rate:  [96] 96  Resp:  [17] 17  BP: (102)/(67) 102/67      Physical Exam:   Constitutional: Awake, alert, No acute distress    Respiratory: Clear to auscultation bilaterally, nonlabored respirations    Cardiovascular: RRR, no murmurs,  "rubs, or gallops, palpable pedal pulses bilaterally   Gastrointestinal: Positive bowel sounds, soft, nontender, nondistended        ASA SCALE ASSESSMENT:  2-Mild to moderate systemic disease, medically well controlled, with no functional limitation    MALLAMPATI CLASSIFICATION:  2-Able to visualize the soft palate, fauces, uvula. The anterior & posterior tonsilar pillars are hidden by the tongue.       Result Review        Result Review:     No results found for: \"NA\"    No results found for: \"K\"    No results found for: \"CL\"    No results found for: \"PLASMABICARB\"    No results found for: \"BUN\"    No results found for: \"CREATININE\"    No results found for: \"CALCIUM\"        No components found for: \"GLUCOSE.*\"  Results from last 7 days   Lab Units 12/04/23  0816   WBC 10*3/mm3 12.90*   HEMOGLOBIN g/dL 9.6*   HEMATOCRIT % 29.5*   PLATELETS 10*3/mm3 60*      Results from last 7 days   Lab Units 12/04/23  0816   INR  0.99           Assessment / Plan     Assesment:   Lung CA      Plan:   Port placement    The risks and benefits of the procedure were discussed with the patient.    Electronically signed by Ty Daugherty III, MD, 12/04/23, 9:12 AM EST.   "

## 2023-12-05 LAB
LAB AP CASE REPORT: NORMAL
PATH REPORT.FINAL DX SPEC: NORMAL

## 2023-12-07 ENCOUNTER — HOSPITAL ENCOUNTER (OUTPATIENT)
Dept: PET IMAGING | Facility: HOSPITAL | Age: 58
Discharge: HOME | End: 2023-12-07
Payer: COMMERCIAL

## 2023-12-07 ENCOUNTER — HOSPITAL ENCOUNTER (OUTPATIENT)
Dept: MRI IMAGING | Facility: HOSPITAL | Age: 58
Discharge: HOME | End: 2023-12-07
Payer: COMMERCIAL

## 2023-12-07 DIAGNOSIS — C34.90 SMALL CELL LUNG CANCER: ICD-10-CM

## 2023-12-07 DIAGNOSIS — C79.31 METASTASIS TO BRAIN: ICD-10-CM

## 2023-12-07 DIAGNOSIS — C34.11 MALIGNANT NEOPLASM OF UPPER LOBE, RIGHT BRONCHUS OR LUNG: ICD-10-CM

## 2023-12-07 PROCEDURE — 71260 CT THORAX DX C+: CPT

## 2023-12-07 PROCEDURE — 70553 MRI BRAIN STEM W/O & W/DYE: CPT

## 2023-12-07 PROCEDURE — 25010000002 GADOTERIDOL PER 1 ML: Performed by: INTERNAL MEDICINE

## 2023-12-07 PROCEDURE — 25510000001 IOPAMIDOL PER 1 ML: Performed by: INTERNAL MEDICINE

## 2023-12-07 PROCEDURE — A9579 GAD-BASE MR CONTRAST NOS,1ML: HCPCS | Performed by: INTERNAL MEDICINE

## 2023-12-07 PROCEDURE — 74177 CT ABD & PELVIS W/CONTRAST: CPT

## 2023-12-07 RX ADMIN — IOPAMIDOL 100 ML: 755 INJECTION, SOLUTION INTRAVENOUS at 12:54

## 2023-12-07 RX ADMIN — GADOTERIDOL 14 ML: 279.3 INJECTION, SOLUTION INTRAVENOUS at 12:16

## 2023-12-08 NOTE — PROGRESS NOTES
HEMATOLOGY ONCOLOGY OUTPATIENT FOLLOW UP       Patient name: Romero Maciel  : 1965  MRN: [id number]  Primary Care Physician: Jennifer Maynard APRN  Referring Physician: Jennifer Maynard APRN  Reason For Consult:         History of Present Illness:    Romero Maciel is a 58 y.o. male who presented to [facility] on 10/20/2023 with complaints of cough, abnormal chest xray.     Patient is a 58-year-old male with history of 60 pack year smoking who has been complaining of shortness of breath, cough weight loss of over 20 pounds over the last few months.  He also started to have hoarseness of voice.  He was seen by PCP and a chest x-ray was ordered which was abnormal patient was advised to come to the ER     10/20/2023 CT chest with very large right upper lobe mass which extends into the right hilum and mediastinum with gross mediastinal adenopathy findings compatible with malignancy.  Postobstructive infiltrates.  Minimal right pleural effusion    10/22/2023 -bronchoscopy with endobronchial biopsy of the mass in the right mainstem bronchus biopsy positive for small cell lung cancer    10/22/2023 -MRI brain with 1.7 cm peripherally enhancing mass within the right occipital lobe with mild surrounding edema concerning for brain metastasis additional findings compatible with chronic microvascular ischemic changes.  Patient was started on dexamethasone    10/26/2023 -PET/CT with large right upper lobe hypermetabolic mass extending to the right hilum consistent with malignancy mass encases the right mainstem bronchus and lobar bronchi with areas of cavitating consolidation in the posterior right upper lobe and groundglass opacities in the right upper lobe likely postobstructive pneumonia.  Confluent hypermetabolic mediastinal adenopathy in the right paratracheal, subcarinal and right hilar region consistent with metastatic adenopathy.  Large right paratracheal logan masses mass effect on the SVC.  Hypermetabolic  left paratracheal and right supraclavicular metastatic adenopathy.  No hypermetabolic adenopathy in the abdomen or pelvis.  Multiple hypermetabolic osseous lesions consistent with osseous metastasis.    10/31/23 - started chemoimmunotherapy tolerated well   11/20/23 - C2    12/7/23 - CT CAP with positive response to treatmentFindings are present likely reflecting treatment response with interval decrease in size of previously noted extensive supraclavicular and mediastinal adenopathy. Also mildly decreased size of the previously noted invasive right hilar and mediastinal mass. There is some persistent narrowing of the right mainstem bronchus and right main pulmonary artery without definite occlusion or focal filling defect. Postobstructive changes in the right lung are mildly improved.  12/7/23 - MRI brain with improvement in metastasis and edema.      Subjective:  Tolerated C2. 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       Past Surgical History:   Procedure Laterality Date    BRONCHOSCOPY N/A 10/22/2023    Procedure: BRONCHOSCOPY WITH CRYO BIOPSY X1 AREA,  FINE NEEDLE ASPIRATION, AND BRONCHOALVEOLAR LAVAGE;  Surgeon: Genia Jenkins MD;  Location: [facility] ENDOSCOPY;  Service: Pulmonary;  Laterality: N/A;  POST: LUNG MASS         Current Outpatient Medications:     budesonide (PULMICORT) 0.5 MG/2ML nebulizer solution, Take 2 mL by nebulization 2 (Two) Times a Day., Disp: 120 mL, Rfl: 0    clonazePAM (KlonoPIN) 1 MG tablet, Take 1 tablet by mouth 2 (Two) Times a Day As Needed., Disp: , Rfl:     dexAMETHasone (DECADRON) 6 MG tablet, Take 1 tablet by mouth Daily With Breakfast. (Patient not taking: Reported on 11/27/2023), Disp: 7 tablet, Rfl: 0    doxepin (SINEquan) 75 MG capsule, Take 1 capsule by mouth Every Night., Disp: , Rfl:     guaiFENesin (MUCINEX) 600 MG 12 hr tablet, Take 2 tablets by  mouth 2 (Two) Times a Day. (Patient not taking: Reported on 10/25/2023), Disp: , Rfl:     ipratropium-albuterol (DUO-NEB) 0.5-2.5 mg/3 ml nebulizer, Take 3 mL by nebulization Every 4 (Four) Hours As Needed for Wheezing., Disp: 180 mL, Rfl: 0    levoFLOXacin (Levaquin) 750 MG tablet, Take 1 tablet by mouth Daily. Take 1 tablet by mouth daily for 5 days (Patient not taking: Reported on 12/11/2023), Disp: 5 tablet, Rfl: 0    lidocaine-prilocaine (EMLA) 2.5-2.5 % cream, Apply 1 application  topically to the appropriate area as directed See Admin Instructions. Apply to port site one hour prior to port being accessed., Disp: 30 g, Rfl: 3    LORazepam (Ativan) 1 MG tablet, Take 1 tablet by mouth Daily. On MRI and Radiation days, Disp: 4 tablet, Rfl: 0    mirtazapine (REMERON) 45 MG tablet, Take 1 tablet by mouth Every Night., Disp: , Rfl:     nicotine (NICODERM CQ) 21 MG/24HR patch, Place 1 patch on the skin as directed by provider Daily. (Patient not taking: Reported on 11/9/2023), Disp: 28 each, Rfl: 0    OLANZapine (zyPREXA) 5 MG tablet, Take 1 tablet by mouth Every Night. Take nightly x 4 starting night of chemotherapy., Disp: 4 tablet, Rfl: 3    ondansetron (ZOFRAN) 8 MG tablet, Take 1 tablet by mouth 3 (Three) Times a Day As Needed for Nausea or Vomiting., Disp: 30 tablet, Rfl: 5    promethazine (PHENERGAN) 25 MG tablet, Take 1 tablet by mouth Every 8 (Eight) Hours As Needed for Nausea or Vomiting for up to 90 days. Alternate taking with Zofran., Disp: 90 tablet, Rfl: 0  No current facility-administered medications for this visit.    Facility-Administered Medications Ordered in Other Visits:     heparin injection 500 Units, 500 Units, Intravenous, PRN, Jeanette Rubio MD, 500 Units at 12/11/23 1229    sodium chloride 0.9 % flush 10 mL, 10 mL, Intravenous, PRN, Jeanette Rubio MD, 10 mL at 12/11/23 1229    Allergies   Allergen Reactions    Penicillins Anaphylaxis       Family History   Problem Relation Age of Onset     "Lung cancer Mother          at age 58    Lung cancer Brother          at age 40       Cancer-related family history includes Lung cancer in his brother and mother.    Social History     Tobacco Use    Smoking status: Former     Packs/day: 1.00     Years: 15.00     Additional pack years: 0.00     Total pack years: 15.00     Types: Cigarettes     Quit date: 10/2023     Years since quittin.1     Passive exposure: Current    Smokeless tobacco: Current   Vaping Use    Vaping Use: Never used   Substance Use Topics    Alcohol use: Never    Drug use: Never     Social History     Social History Narrative    Not on file        Objective:  Vital signs:  Vitals:    23 0954   BP: 114/77   Pulse: 104   Resp: 14   Temp: 97.7 °F (36.5 °C)   SpO2: 97%   Weight: 67.1 kg (148 lb)   Height: 175.3 cm (69\")   PainSc: 0-No pain         Body mass index is 21.86 kg/m².  ECOG  (1) Restricted in physically strenuous activity, ambulatory and able to do work of light nature    Physical Exam:   Physical Exam  Constitutional:       Appearance: Normal appearance.   HENT:      Head: Normocephalic and atraumatic.   Eyes:      Pupils: Pupils are equal, round, and reactive to light.   Cardiovascular:      Rate and Rhythm: Normal rate and regular rhythm.      Pulses: Normal pulses.      Heart sounds: No murmur heard.  Pulmonary:      Effort: Pulmonary effort is normal.      Breath sounds: Normal breath sounds.   Abdominal:      General: There is no distension.      Palpations: Abdomen is soft. There is no mass.      Tenderness: There is no abdominal tenderness.   Musculoskeletal:         General: Normal range of motion.      Cervical back: Normal range of motion and neck supple.   Skin:     General: Skin is warm.   Neurological:      General: No focal deficit present.      Mental Status: He is alert and oriented to person, place, and time.   Psychiatric:         Mood and Affect: Mood normal.         Lab Results - Last 18 Months   Lab " "Units 12/11/23  0800 12/04/23  0816 11/28/23  1011   WBC 10*3/mm3 7.81 12.90* 1.30*   HEMOGLOBIN g/dL 8.3* 9.6* 9.2*   HEMATOCRIT % 27.4* 29.5* 29.2*   PLATELETS 10*3/mm3 306 60* 144   MCV fL 98.9* 89.3 93.0     Lab Results - Last 18 Months   Lab Units 12/11/23  0820 12/11/23  0800 11/20/23  0829 11/20/23  0816 10/31/23  0836 10/31/23  0826   SODIUM mmol/L  --  138  --  137  --  137   POTASSIUM mmol/L  --  3.7  --  4.0  --  3.8   CHLORIDE mmol/L  --  102  --  101  --  100   CO2 mmol/L  --  25.0  --  24.0  --  24.0   BUN mg/dL  --  9  --  7  --  24*   CREATININE mg/dL 0.90 0.86 1.00 0.95   < > 1.12   CALCIUM mg/dL  --  9.1  --  9.6  --  9.7   BILIRUBIN mg/dL  --  0.2  --  0.2  --  0.3   ALK PHOS U/L  --  66  --  86  --  93   ALT (SGPT) U/L  --  11  --  16  --  117*   AST (SGOT) U/L  --  17  --  17  --  66*   GLUCOSE mg/dL  --  143*  --  192*  --  145*    < > = values in this interval not displayed.       Lab Results   Component Value Date    GLUCOSE 143 (H) 12/11/2023    BUN 9 12/11/2023    CREATININE 0.90 12/11/2023    BCR 10.5 12/11/2023    K 3.7 12/11/2023    CO2 25.0 12/11/2023    CALCIUM 9.1 12/11/2023    ALBUMIN 3.7 12/11/2023    AST 17 12/11/2023    ALT 11 12/11/2023       Lab Results - Last 18 Months   Lab Units 12/04/23  0816   INR  0.99   APTT seconds 25.8       Lab Results   Component Value Date    IRON 34 (L) 10/22/2023    TIBC 174 (L) 10/22/2023    FERRITIN 546.30 (H) 10/20/2023       Lab Results   Component Value Date    FOLATE <2.00 (L) 10/21/2023       No results found for: \"OCCULTBLD\"    No results found for: \"RETICCTPCT\"  Lab Results   Component Value Date    KZBCXAOV60 276 10/21/2023     No results found for: \"SPEP\", \"UPEP\"  No results found for: \"LDH\", \"URICACID\"  No results found for: \"ALMA\", \"RF\", \"SEDRATE\"  No results found for: \"FIBRINOGEN\", \"HAPTOGLOBIN\"  Lab Results   Component Value Date    PTT 25.8 12/04/2023    INR 0.99 12/04/2023     No results found for: \"\"  No results found for: " "\"CEA\"  No components found for: \"CA-19-9\"  No results found for: \"PSA\"  No results found for: \"SEDRATE\"    Assessment & Plan       Patient is a 58-year-old male with chronic smoking history who presents with cough, shortness of breath CT imaging concerning for a large right upper lobe mass mediastinal adenopathy biopsy positive for extensive stage small cell lung cancer     Extensive stage small cell lung cancer  Given osseous metastasis, brain metastasis extensive stage small cell lung cancer  Patient is not having any symptoms specific to his brain metastasis.  Holding off on SRS to the brain lesion  Started chemoimmunotherapy with carboplatin etoposide and Tecentriq  Tolerated c1 well with some fatigue. No significant nausea  C2 with fatigue  CT imaging and MRI brain with good response to treatment  Continue treatment today.     Anemia  Normocytic anemia  Iron level is low however ferritin is high which could be an acute phase reactant  Monitor CBC obtain complete iron profile including iron saturation B12 and folate levels  Low folate, start replacement.  Continue same       Thank you very much for providing the opportunity to participate in this patient’s care. Please do not hesitate to call if there are any other questions.  Time spent on encounter including record review, history taking, exam, discussion, counseling and documentation at: 40 minutes    "

## 2023-12-11 ENCOUNTER — HOSPITAL ENCOUNTER (OUTPATIENT)
Dept: ONCOLOGY | Facility: HOSPITAL | Age: 58
Discharge: HOME | End: 2023-12-11
Admitting: INTERNAL MEDICINE
Payer: COMMERCIAL

## 2023-12-11 ENCOUNTER — OFFICE VISIT (OUTPATIENT)
Dept: ONCOLOGY | Facility: CLINIC | Age: 58
End: 2023-12-11
Payer: COMMERCIAL

## 2023-12-11 VITALS
SYSTOLIC BLOOD PRESSURE: 114 MMHG | DIASTOLIC BLOOD PRESSURE: 77 MMHG | WEIGHT: 148.8 LBS | HEART RATE: 104 BPM | TEMPERATURE: 97.7 F | BODY MASS INDEX: 21.97 KG/M2 | OXYGEN SATURATION: 97 %

## 2023-12-11 VITALS
HEART RATE: 104 BPM | WEIGHT: 148 LBS | OXYGEN SATURATION: 97 % | TEMPERATURE: 97.7 F | RESPIRATION RATE: 14 BRPM | SYSTOLIC BLOOD PRESSURE: 114 MMHG | BODY MASS INDEX: 21.92 KG/M2 | HEIGHT: 69 IN | DIASTOLIC BLOOD PRESSURE: 77 MMHG

## 2023-12-11 DIAGNOSIS — C34.90 SMALL CELL LUNG CANCER: Primary | ICD-10-CM

## 2023-12-11 DIAGNOSIS — C79.31 METASTASIS TO BRAIN: ICD-10-CM

## 2023-12-11 DIAGNOSIS — Z45.2 ENCOUNTER FOR CENTRAL LINE CARE: ICD-10-CM

## 2023-12-11 LAB
ALBUMIN SERPL-MCNC: 3.7 G/DL (ref 3.5–5.2)
ALBUMIN/GLOB SERPL: 1.3 G/DL
ALP SERPL-CCNC: 66 U/L (ref 39–117)
ALT SERPL W P-5'-P-CCNC: 11 U/L (ref 1–41)
ANION GAP SERPL CALCULATED.3IONS-SCNC: 11 MMOL/L (ref 5–15)
AST SERPL-CCNC: 17 U/L (ref 1–40)
BASOPHILS # BLD AUTO: 0.1 10*3/MM3 (ref 0–0.2)
BASOPHILS NFR BLD AUTO: 1.3 % (ref 0–1.5)
BILIRUB SERPL-MCNC: 0.2 MG/DL (ref 0–1.2)
BUN SERPL-MCNC: 9 MG/DL (ref 6–20)
BUN/CREAT SERPL: 10.5 (ref 7–25)
CALCIUM SPEC-SCNC: 9.1 MG/DL (ref 8.6–10.5)
CHLORIDE SERPL-SCNC: 102 MMOL/L (ref 98–107)
CO2 SERPL-SCNC: 25 MMOL/L (ref 22–29)
CREAT BLDA-MCNC: 0.9 MG/DL (ref 0.6–1.3)
CREAT SERPL-MCNC: 0.86 MG/DL (ref 0.76–1.27)
DEPRECATED RDW RBC AUTO: 80.9 FL (ref 37–54)
EGFRCR SERPLBLD CKD-EPI 2021: 98.4 ML/MIN/1.73
EGFRCR SERPLBLD CKD-EPI 2021: 99.7 ML/MIN/1.73
EOSINOPHIL # BLD AUTO: 0.09 10*3/MM3 (ref 0–0.4)
EOSINOPHIL NFR BLD AUTO: 1.2 % (ref 0.3–6.2)
ERYTHROCYTE [DISTWIDTH] IN BLOOD BY AUTOMATED COUNT: 23.9 % (ref 12.3–15.4)
GLOBULIN UR ELPH-MCNC: 2.8 GM/DL
GLUCOSE BLDC GLUCOMTR-MCNC: 141 MG/DL (ref 70–105)
GLUCOSE SERPL-MCNC: 143 MG/DL (ref 65–99)
HCT VFR BLD AUTO: 27.4 % (ref 37.5–51)
HGB BLD-MCNC: 8.3 G/DL (ref 13–17.7)
LYMPHOCYTES # BLD AUTO: 1.4 10*3/MM3 (ref 0.7–3.1)
LYMPHOCYTES NFR BLD AUTO: 17.9 % (ref 19.6–45.3)
MCH RBC QN AUTO: 30 PG (ref 26.6–33)
MCHC RBC AUTO-ENTMCNC: 30.3 G/DL (ref 31.5–35.7)
MCV RBC AUTO: 98.9 FL (ref 79–97)
MONOCYTES # BLD AUTO: 0.89 10*3/MM3 (ref 0.1–0.9)
MONOCYTES NFR BLD AUTO: 11.4 % (ref 5–12)
NEUTROPHILS NFR BLD AUTO: 5.33 10*3/MM3 (ref 1.7–7)
NEUTROPHILS NFR BLD AUTO: 68.2 % (ref 42.7–76)
PLATELET # BLD AUTO: 306 10*3/MM3 (ref 140–450)
PMV BLD AUTO: 8.7 FL (ref 6–12)
POTASSIUM SERPL-SCNC: 3.7 MMOL/L (ref 3.5–5.2)
PROT SERPL-MCNC: 6.5 G/DL (ref 6–8.5)
RBC # BLD AUTO: 2.77 10*6/MM3 (ref 4.14–5.8)
SODIUM SERPL-SCNC: 138 MMOL/L (ref 136–145)
T3FREE SERPL-MCNC: 3.23 PG/ML (ref 2–4.4)
T4 FREE SERPL-MCNC: 1 NG/DL (ref 0.93–1.7)
TSH SERPL DL<=0.05 MIU/L-ACNC: 3.59 UIU/ML (ref 0.27–4.2)
WBC NRBC COR # BLD AUTO: 7.81 10*3/MM3 (ref 3.4–10.8)

## 2023-12-11 PROCEDURE — 84481 FREE ASSAY (FT-3): CPT | Performed by: INTERNAL MEDICINE

## 2023-12-11 PROCEDURE — 96375 TX/PRO/DX INJ NEW DRUG ADDON: CPT

## 2023-12-11 PROCEDURE — 25810000003 SODIUM CHLORIDE 0.9 % SOLUTION 500 ML FLEX CONT: Performed by: INTERNAL MEDICINE

## 2023-12-11 PROCEDURE — 25010000002 DEXAMETHASONE SODIUM PHOSPHATE 120 MG/30ML SOLUTION: Performed by: INTERNAL MEDICINE

## 2023-12-11 PROCEDURE — 96367 TX/PROPH/DG ADDL SEQ IV INF: CPT

## 2023-12-11 PROCEDURE — 25010000002 CARBOPLATIN PER 50 MG: Performed by: INTERNAL MEDICINE

## 2023-12-11 PROCEDURE — 96413 CHEMO IV INFUSION 1 HR: CPT

## 2023-12-11 PROCEDURE — 80050 GENERAL HEALTH PANEL: CPT | Performed by: INTERNAL MEDICINE

## 2023-12-11 PROCEDURE — 99215 OFFICE O/P EST HI 40 MIN: CPT | Performed by: INTERNAL MEDICINE

## 2023-12-11 PROCEDURE — 25010000002 ATEZOLIZUMAB 1200 MG/20ML SOLUTION 20 ML VIAL: Performed by: INTERNAL MEDICINE

## 2023-12-11 PROCEDURE — 82948 REAGENT STRIP/BLOOD GLUCOSE: CPT

## 2023-12-11 PROCEDURE — 25010000002 HEPARIN LOCK FLUSH PER 10 UNITS: Performed by: INTERNAL MEDICINE

## 2023-12-11 PROCEDURE — 25810000003 SODIUM CHLORIDE 0.9 % SOLUTION 250 ML FLEX CONT: Performed by: INTERNAL MEDICINE

## 2023-12-11 PROCEDURE — 84439 ASSAY OF FREE THYROXINE: CPT | Performed by: INTERNAL MEDICINE

## 2023-12-11 PROCEDURE — 82565 ASSAY OF CREATININE: CPT

## 2023-12-11 PROCEDURE — 25010000002 PALONOSETRON PER 25 MCG: Performed by: INTERNAL MEDICINE

## 2023-12-11 PROCEDURE — 25810000003 SODIUM CHLORIDE 0.9 % SOLUTION: Performed by: INTERNAL MEDICINE

## 2023-12-11 PROCEDURE — 96417 CHEMO IV INFUS EACH ADDL SEQ: CPT

## 2023-12-11 PROCEDURE — 25010000002 ETOPOSIDE 500 MG/25ML SOLUTION 25 ML VIAL: Performed by: INTERNAL MEDICINE

## 2023-12-11 PROCEDURE — 25010000002 FOSAPREPITANT PER 1 MG: Performed by: INTERNAL MEDICINE

## 2023-12-11 RX ORDER — SODIUM CHLORIDE 0.9 % (FLUSH) 0.9 %
10 SYRINGE (ML) INJECTION AS NEEDED
Status: CANCELLED | OUTPATIENT
Start: 2023-12-11

## 2023-12-11 RX ORDER — DIPHENHYDRAMINE HYDROCHLORIDE 50 MG/ML
50 INJECTION INTRAMUSCULAR; INTRAVENOUS AS NEEDED
Status: CANCELLED | OUTPATIENT
Start: 2023-12-11

## 2023-12-11 RX ORDER — SODIUM CHLORIDE 9 MG/ML
20 INJECTION, SOLUTION INTRAVENOUS ONCE
Status: COMPLETED | OUTPATIENT
Start: 2023-12-11 | End: 2023-12-11

## 2023-12-11 RX ORDER — PALONOSETRON 0.05 MG/ML
0.25 INJECTION, SOLUTION INTRAVENOUS ONCE
Status: COMPLETED | OUTPATIENT
Start: 2023-12-11 | End: 2023-12-11

## 2023-12-11 RX ORDER — HEPARIN SODIUM (PORCINE) LOCK FLUSH IV SOLN 100 UNIT/ML 100 UNIT/ML
500 SOLUTION INTRAVENOUS AS NEEDED
Status: CANCELLED | OUTPATIENT
Start: 2023-12-11

## 2023-12-11 RX ORDER — FAMOTIDINE 10 MG/ML
20 INJECTION, SOLUTION INTRAVENOUS AS NEEDED
Status: CANCELLED | OUTPATIENT
Start: 2023-12-11

## 2023-12-11 RX ORDER — SODIUM CHLORIDE 0.9 % (FLUSH) 0.9 %
10 SYRINGE (ML) INJECTION AS NEEDED
Status: DISCONTINUED | OUTPATIENT
Start: 2023-12-11 | End: 2023-12-12 | Stop reason: HOSPADM

## 2023-12-11 RX ORDER — HEPARIN SODIUM (PORCINE) LOCK FLUSH IV SOLN 100 UNIT/ML 100 UNIT/ML
500 SOLUTION INTRAVENOUS AS NEEDED
Status: DISCONTINUED | OUTPATIENT
Start: 2023-12-11 | End: 2023-12-12 | Stop reason: HOSPADM

## 2023-12-11 RX ORDER — SODIUM CHLORIDE 9 MG/ML
20 INJECTION, SOLUTION INTRAVENOUS ONCE
Status: CANCELLED | OUTPATIENT
Start: 2023-12-13

## 2023-12-11 RX ORDER — SODIUM CHLORIDE 9 MG/ML
20 INJECTION, SOLUTION INTRAVENOUS ONCE
Status: CANCELLED | OUTPATIENT
Start: 2023-12-12

## 2023-12-11 RX ADMIN — PALONOSETRON HYDROCHLORIDE 0.25 MG: 0.25 INJECTION, SOLUTION INTRAVENOUS at 09:54

## 2023-12-11 RX ADMIN — Medication 10 ML: at 12:29

## 2023-12-11 RX ADMIN — ETOPOSIDE 180 MG: 20 INJECTION INTRAVENOUS at 11:23

## 2023-12-11 RX ADMIN — ATEZOLIZUMAB 1200 MG: 1200 INJECTION, SOLUTION INTRAVENOUS at 09:16

## 2023-12-11 RX ADMIN — HEPARIN 500 UNITS: 100 SYRINGE at 12:29

## 2023-12-11 RX ADMIN — SODIUM CHLORIDE 20 ML/HR: 9 INJECTION, SOLUTION INTRAVENOUS at 09:15

## 2023-12-11 RX ADMIN — CARBOPLATIN 550 MG: 10 INJECTION INTRAVENOUS at 10:49

## 2023-12-11 RX ADMIN — FOSAPREPITANT 100 ML: 150 INJECTION, POWDER, LYOPHILIZED, FOR SOLUTION INTRAVENOUS at 09:54

## 2023-12-11 RX ADMIN — DEXAMETHASONE SODIUM PHOSPHATE 12 MG: 4 INJECTION, SOLUTION INTRA-ARTICULAR; INTRALESIONAL; INTRAMUSCULAR; INTRAVENOUS; SOFT TISSUE at 10:27

## 2023-12-12 ENCOUNTER — HOSPITAL ENCOUNTER (OUTPATIENT)
Dept: ONCOLOGY | Facility: HOSPITAL | Age: 58
Discharge: HOME | End: 2023-12-12
Admitting: INTERNAL MEDICINE
Payer: COMMERCIAL

## 2023-12-12 VITALS
WEIGHT: 151 LBS | SYSTOLIC BLOOD PRESSURE: 111 MMHG | RESPIRATION RATE: 18 BRPM | OXYGEN SATURATION: 96 % | HEART RATE: 108 BPM | DIASTOLIC BLOOD PRESSURE: 69 MMHG | BODY MASS INDEX: 22.36 KG/M2 | HEIGHT: 69 IN | TEMPERATURE: 97.4 F

## 2023-12-12 DIAGNOSIS — C34.90 SMALL CELL LUNG CANCER: Primary | ICD-10-CM

## 2023-12-12 DIAGNOSIS — C79.31 METASTASIS TO BRAIN: ICD-10-CM

## 2023-12-12 DIAGNOSIS — Z45.2 ENCOUNTER FOR CENTRAL LINE CARE: ICD-10-CM

## 2023-12-12 PROCEDURE — 25010000002 DEXAMETHASONE SODIUM PHOSPHATE 120 MG/30ML SOLUTION: Performed by: INTERNAL MEDICINE

## 2023-12-12 PROCEDURE — 25010000002 HEPARIN LOCK FLUSH PER 10 UNITS: Performed by: INTERNAL MEDICINE

## 2023-12-12 PROCEDURE — 25010000002 ETOPOSIDE 500 MG/25ML SOLUTION 25 ML VIAL: Performed by: INTERNAL MEDICINE

## 2023-12-12 PROCEDURE — 96367 TX/PROPH/DG ADDL SEQ IV INF: CPT

## 2023-12-12 PROCEDURE — 25810000003 SODIUM CHLORIDE 0.9 % SOLUTION 500 ML FLEX CONT: Performed by: INTERNAL MEDICINE

## 2023-12-12 PROCEDURE — 96375 TX/PRO/DX INJ NEW DRUG ADDON: CPT

## 2023-12-12 PROCEDURE — 25810000003 SODIUM CHLORIDE 0.9 % SOLUTION: Performed by: INTERNAL MEDICINE

## 2023-12-12 PROCEDURE — 96413 CHEMO IV INFUSION 1 HR: CPT

## 2023-12-12 RX ORDER — SODIUM CHLORIDE 9 MG/ML
20 INJECTION, SOLUTION INTRAVENOUS ONCE
Status: COMPLETED | OUTPATIENT
Start: 2023-12-12 | End: 2023-12-12

## 2023-12-12 RX ORDER — HEPARIN SODIUM (PORCINE) LOCK FLUSH IV SOLN 100 UNIT/ML 100 UNIT/ML
500 SOLUTION INTRAVENOUS AS NEEDED
Status: DISCONTINUED | OUTPATIENT
Start: 2023-12-12 | End: 2023-12-13 | Stop reason: HOSPADM

## 2023-12-12 RX ORDER — SODIUM CHLORIDE 0.9 % (FLUSH) 0.9 %
10 SYRINGE (ML) INJECTION AS NEEDED
Status: DISCONTINUED | OUTPATIENT
Start: 2023-12-12 | End: 2023-12-13 | Stop reason: HOSPADM

## 2023-12-12 RX ORDER — SODIUM CHLORIDE 0.9 % (FLUSH) 0.9 %
10 SYRINGE (ML) INJECTION AS NEEDED
Status: CANCELLED | OUTPATIENT
Start: 2023-12-12

## 2023-12-12 RX ORDER — HEPARIN SODIUM (PORCINE) LOCK FLUSH IV SOLN 100 UNIT/ML 100 UNIT/ML
500 SOLUTION INTRAVENOUS AS NEEDED
Status: CANCELLED | OUTPATIENT
Start: 2023-12-12

## 2023-12-12 RX ADMIN — HEPARIN 500 UNITS: 100 SYRINGE at 10:50

## 2023-12-12 RX ADMIN — SODIUM CHLORIDE 20 ML/HR: 9 INJECTION, SOLUTION INTRAVENOUS at 09:16

## 2023-12-12 RX ADMIN — ETOPOSIDE 180 MG: 20 INJECTION INTRAVENOUS at 09:38

## 2023-12-12 RX ADMIN — DEXAMETHASONE SODIUM PHOSPHATE 12 MG: 4 INJECTION, SOLUTION INTRA-ARTICULAR; INTRALESIONAL; INTRAMUSCULAR; INTRAVENOUS; SOFT TISSUE at 09:19

## 2023-12-12 RX ADMIN — Medication 10 ML: at 10:50

## 2023-12-12 NOTE — PROGRESS NOTES
Pt here for C3D2 , denies problems/issues    At 1056 pt reports developed pain to lower back with rating of 2-3, pt reports has history of back pain but he didn't have when came in to office today and thinks may be related to chair.  Reviewed with A NAZARIO Fisher and will cont to monitor and pt to notify office if continues or worsening and ok to discharge patient to home.  Reviewed with pt and friend and v/u and agreement.

## 2023-12-13 ENCOUNTER — HOSPITAL ENCOUNTER (OUTPATIENT)
Dept: ONCOLOGY | Facility: HOSPITAL | Age: 58
Discharge: HOME | End: 2023-12-13
Admitting: INTERNAL MEDICINE
Payer: COMMERCIAL

## 2023-12-13 VITALS
HEART RATE: 105 BPM | WEIGHT: 149.1 LBS | DIASTOLIC BLOOD PRESSURE: 67 MMHG | OXYGEN SATURATION: 97 % | HEIGHT: 69 IN | TEMPERATURE: 97.4 F | RESPIRATION RATE: 18 BRPM | BODY MASS INDEX: 22.08 KG/M2 | SYSTOLIC BLOOD PRESSURE: 109 MMHG

## 2023-12-13 DIAGNOSIS — Z45.2 ENCOUNTER FOR CENTRAL LINE CARE: ICD-10-CM

## 2023-12-13 DIAGNOSIS — C34.90 SMALL CELL LUNG CANCER: Primary | ICD-10-CM

## 2023-12-13 DIAGNOSIS — C79.31 METASTASIS TO BRAIN: ICD-10-CM

## 2023-12-13 PROCEDURE — 25010000002 HEPARIN LOCK FLUSH PER 10 UNITS: Performed by: INTERNAL MEDICINE

## 2023-12-13 PROCEDURE — 25010000002 DEXAMETHASONE SODIUM PHOSPHATE 120 MG/30ML SOLUTION: Performed by: INTERNAL MEDICINE

## 2023-12-13 PROCEDURE — 96367 TX/PROPH/DG ADDL SEQ IV INF: CPT

## 2023-12-13 PROCEDURE — 96375 TX/PRO/DX INJ NEW DRUG ADDON: CPT

## 2023-12-13 PROCEDURE — 96413 CHEMO IV INFUSION 1 HR: CPT

## 2023-12-13 PROCEDURE — 25810000003 SODIUM CHLORIDE 0.9 % SOLUTION: Performed by: INTERNAL MEDICINE

## 2023-12-13 PROCEDURE — 25010000002 ETOPOSIDE 500 MG/25ML SOLUTION 25 ML VIAL: Performed by: INTERNAL MEDICINE

## 2023-12-13 PROCEDURE — 25810000003 SODIUM CHLORIDE 0.9 % SOLUTION 500 ML FLEX CONT: Performed by: INTERNAL MEDICINE

## 2023-12-13 RX ORDER — SODIUM CHLORIDE 9 MG/ML
20 INJECTION, SOLUTION INTRAVENOUS ONCE
Status: COMPLETED | OUTPATIENT
Start: 2023-12-13 | End: 2023-12-13

## 2023-12-13 RX ORDER — SODIUM CHLORIDE 0.9 % (FLUSH) 0.9 %
10 SYRINGE (ML) INJECTION AS NEEDED
Status: DISCONTINUED | OUTPATIENT
Start: 2023-12-13 | End: 2023-12-14 | Stop reason: HOSPADM

## 2023-12-13 RX ORDER — SODIUM CHLORIDE 0.9 % (FLUSH) 0.9 %
10 SYRINGE (ML) INJECTION AS NEEDED
OUTPATIENT
Start: 2023-12-13

## 2023-12-13 RX ORDER — HEPARIN SODIUM (PORCINE) LOCK FLUSH IV SOLN 100 UNIT/ML 100 UNIT/ML
500 SOLUTION INTRAVENOUS AS NEEDED
OUTPATIENT
Start: 2023-12-13

## 2023-12-13 RX ORDER — HEPARIN SODIUM (PORCINE) LOCK FLUSH IV SOLN 100 UNIT/ML 100 UNIT/ML
500 SOLUTION INTRAVENOUS AS NEEDED
Status: DISCONTINUED | OUTPATIENT
Start: 2023-12-13 | End: 2023-12-14 | Stop reason: HOSPADM

## 2023-12-13 RX ADMIN — Medication 10 ML: at 10:25

## 2023-12-13 RX ADMIN — ETOPOSIDE 180 MG: 20 INJECTION INTRAVENOUS at 09:19

## 2023-12-13 RX ADMIN — HEPARIN 500 UNITS: 100 SYRINGE at 10:25

## 2023-12-13 RX ADMIN — SODIUM CHLORIDE 20 ML/HR: 9 INJECTION, SOLUTION INTRAVENOUS at 08:57

## 2023-12-13 RX ADMIN — DEXAMETHASONE SODIUM PHOSPHATE 12 MG: 4 INJECTION, SOLUTION INTRA-ARTICULAR; INTRALESIONAL; INTRAMUSCULAR; INTRAVENOUS; SOFT TISSUE at 08:57

## 2023-12-13 NOTE — PROGRESS NOTES
Pt here for C3D3 , denies problems/issues    Pt reports his back pain is resolved and has had no further issues   [FreeTextEntry1] : Patient is a 14yo female with PMH familial HLD, JOHN who presents to the office for hospital follow up.  Pt was hospitalized at [facility] from 10/10/22-10/13/22 for acute pancreatitis.  Pt had mildly dilated pancreatic duct on US.  Pt has been tolerating normal diet since discharge, has mild epigastric discomfort but feels significantly better.\par \par Pancreatitis\par - Labs drawn in office.\par - Lyman diet, lots of fluids, advance as tolerated.\par - Avoid fatty/fried foods, alcohol, etc.\par - Consider pediatric GI follow up.  Pt's mother is going to research pediatric GIs in the area.\par - Call the office or go to the ED immediately if you develop new, worsening or concerning symptoms including high fever, severe headache/worst headache of your life, confusion, dizziness/lightheadedness, loss of consciousness, severe chest pain, difficulty breathing, shortness of breath, severe abdominal pain, excessive vomiting/diarrhea, inability to feel/move the extremities, or any other concerning symptoms.\par

## 2023-12-14 ENCOUNTER — HOSPITAL ENCOUNTER (OUTPATIENT)
Dept: ONCOLOGY | Facility: HOSPITAL | Age: 58
Discharge: HOME | End: 2023-12-14
Admitting: INTERNAL MEDICINE
Payer: COMMERCIAL

## 2023-12-14 DIAGNOSIS — C34.90 SMALL CELL LUNG CANCER: Primary | ICD-10-CM

## 2023-12-14 PROCEDURE — 96372 THER/PROPH/DIAG INJ SC/IM: CPT

## 2023-12-14 PROCEDURE — 25010000002 PEGFILGRASTIM-JMDB 6 MG/0.6ML SOLUTION PREFILLED SYRINGE: Performed by: INTERNAL MEDICINE

## 2023-12-14 RX ADMIN — PEGFILGRASTIM 6 MG: 6 INJECTION SUBCUTANEOUS at 14:57

## 2023-12-14 NOTE — PROGRESS NOTES
Patient is here for fulphila injection. Patient tolerated injection and was discharged and is aware of next appointment.

## 2023-12-29 NOTE — PROGRESS NOTES
HEMATOLOGY ONCOLOGY OUTPATIENT FOLLOW UP       Patient name: Romero Maciel  : 1965  MRN: [id number]  Primary Care Physician: Jennifer Maynard APRN  Referring Physician: Jennifer Maynard APRN  Reason For Consult:         History of Present Illness:    Romero Maciel is a 58 y.o. male who presented to [facility] on 10/20/2023 with complaints of cough, abnormal chest xray.     Patient is a 58-year-old male with history of 60 pack year smoking who has been complaining of shortness of breath, cough weight loss of over 20 pounds over the last few months.  He also started to have hoarseness of voice.  He was seen by PCP and a chest x-ray was ordered which was abnormal patient was advised to come to the ER     10/20/2023 CT chest with very large right upper lobe mass which extends into the right hilum and mediastinum with gross mediastinal adenopathy findings compatible with malignancy.  Postobstructive infiltrates.  Minimal right pleural effusion    10/22/2023 -bronchoscopy with endobronchial biopsy of the mass in the right mainstem bronchus biopsy positive for small cell lung cancer    10/22/2023 -MRI brain with 1.7 cm peripherally enhancing mass within the right occipital lobe with mild surrounding edema concerning for brain metastasis additional findings compatible with chronic microvascular ischemic changes.  Patient was started on dexamethasone    10/26/2023 -PET/CT with large right upper lobe hypermetabolic mass extending to the right hilum consistent with malignancy mass encases the right mainstem bronchus and lobar bronchi with areas of cavitating consolidation in the posterior right upper lobe and groundglass opacities in the right upper lobe likely postobstructive pneumonia.  Confluent hypermetabolic mediastinal adenopathy in the right paratracheal, subcarinal and right hilar region consistent with metastatic adenopathy.  Large right paratracheal logan masses mass effect on the SVC.  Hypermetabolic  left paratracheal and right supraclavicular metastatic adenopathy.  No hypermetabolic adenopathy in the abdomen or pelvis.  Multiple hypermetabolic osseous lesions consistent with osseous metastasis.    10/31/23 - started chemoimmunotherapy tolerated well   11/20/23 - C2    12/7/23 - CT CAP with positive response to treatmentFindings are present likely reflecting treatment response with interval decrease in size of previously noted extensive supraclavicular and mediastinal adenopathy. Also mildly decreased size of the previously noted invasive right hilar and mediastinal mass. There is some persistent narrowing of the right mainstem bronchus and right main pulmonary artery without definite occlusion or focal filling defect. Postobstructive changes in the right lung are mildly improved.  12/7/23 - MRI brain with improvement in metastasis and edema.      Subjective:  No new symptoms, has ongoing fatigue.     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       Past Surgical History:   Procedure Laterality Date    BRONCHOSCOPY N/A 10/22/2023    Procedure: BRONCHOSCOPY WITH CRYO BIOPSY X1 AREA,  FINE NEEDLE ASPIRATION, AND BRONCHOALVEOLAR LAVAGE;  Surgeon: Genia Jenkins MD;  Location: [facility] ENDOSCOPY;  Service: Pulmonary;  Laterality: N/A;  POST: LUNG MASS         Current Outpatient Medications:     budesonide (PULMICORT) 0.5 MG/2ML nebulizer solution, Take 2 mL by nebulization 2 (Two) Times a Day., Disp: 120 mL, Rfl: 0    clonazePAM (KlonoPIN) 1 MG tablet, Take 1 tablet by mouth 2 (Two) Times a Day As Needed., Disp: , Rfl:     dexAMETHasone (DECADRON) 6 MG tablet, Take 1 tablet by mouth Daily With Breakfast. (Patient not taking: Reported on 11/27/2023), Disp: 7 tablet, Rfl: 0    doxepin (SINEquan) 75 MG capsule, Take 1 capsule by mouth Every Night., Disp: , Rfl:     guaiFENesin (MUCINEX) 600 MG 12 hr tablet, Take 2 tablets  by mouth 2 (Two) Times a Day. (Patient not taking: Reported on 10/25/2023), Disp: , Rfl:     ipratropium-albuterol (DUO-NEB) 0.5-2.5 mg/3 ml nebulizer, Take 3 mL by nebulization Every 4 (Four) Hours As Needed for Wheezing., Disp: 180 mL, Rfl: 0    levoFLOXacin (Levaquin) 750 MG tablet, Take 1 tablet by mouth Daily. Take 1 tablet by mouth daily for 5 days (Patient not taking: Reported on 12/11/2023), Disp: 5 tablet, Rfl: 0    lidocaine-prilocaine (EMLA) 2.5-2.5 % cream, Apply 1 application  topically to the appropriate area as directed See Admin Instructions. Apply to port site one hour prior to port being accessed., Disp: 30 g, Rfl: 3    LORazepam (Ativan) 1 MG tablet, Take 1 tablet by mouth Daily. On MRI and Radiation days, Disp: 4 tablet, Rfl: 0    mirtazapine (REMERON) 45 MG tablet, Take 1 tablet by mouth Every Night., Disp: , Rfl:     nicotine (NICODERM CQ) 21 MG/24HR patch, Place 1 patch on the skin as directed by provider Daily. (Patient not taking: Reported on 11/9/2023), Disp: 28 each, Rfl: 0    OLANZapine (zyPREXA) 5 MG tablet, Take 1 tablet by mouth Every Night. Take nightly x 4 starting night of chemotherapy., Disp: 4 tablet, Rfl: 3    ondansetron (ZOFRAN) 8 MG tablet, Take 1 tablet by mouth 3 (Three) Times a Day As Needed for Nausea or Vomiting., Disp: 30 tablet, Rfl: 5    promethazine (PHENERGAN) 25 MG tablet, Take 1 tablet by mouth Every 8 (Eight) Hours As Needed for Nausea or Vomiting for up to 90 days. Alternate taking with Zofran., Disp: 90 tablet, Rfl: 0  No current facility-administered medications for this visit.    Facility-Administered Medications Ordered in Other Visits:     CARBOplatin (PARAPLATIN) 500 mg in sodium chloride 0.9 % 300 mL chemo IVPB, 500 mg, Intravenous, Once, Jeanette Rubio MD, Last Rate: 610 mL/hr at 01/02/24 1004, 500 mg at 01/02/24 1004    etoposide (TOPOSAR) 180 mg in sodium chloride 0.9 % 509 mL chemo IVPB, 100 mg/m2 (Treatment Plan Recorded), Intravenous, Once, Rubio,  "MD Jeanette    Allergies   Allergen Reactions    Penicillins Anaphylaxis       Family History   Problem Relation Age of Onset    Lung cancer Mother          at age 58    Lung cancer Brother          at age 40       Cancer-related family history includes Lung cancer in his brother and mother.    Social History     Tobacco Use    Smoking status: Former     Packs/day: 1.00     Years: 15.00     Additional pack years: 0.00     Total pack years: 15.00     Types: Cigarettes     Quit date: 10/2023     Years since quittin.2     Passive exposure: Current    Smokeless tobacco: Current   Vaping Use    Vaping Use: Never used   Substance Use Topics    Alcohol use: Never    Drug use: Never     Social History     Social History Narrative    Not on file        Objective:  Vital signs:  Vitals:    24 0804   BP: 127/76   Pulse: 118   Resp: 18   Temp: 97.6 °F (36.4 °C)   Weight: 68 kg (150 lb)   Height: 175.3 cm (69\")   PainSc: 0-No pain           Body mass index is 22.15 kg/m².  ECOG  (1) Restricted in physically strenuous activity, ambulatory and able to do work of light nature    Physical Exam:   Physical Exam  Constitutional:       Appearance: Normal appearance.   HENT:      Head: Normocephalic and atraumatic.   Eyes:      Pupils: Pupils are equal, round, and reactive to light.   Cardiovascular:      Rate and Rhythm: Normal rate and regular rhythm.      Pulses: Normal pulses.      Heart sounds: No murmur heard.  Pulmonary:      Effort: Pulmonary effort is normal.      Breath sounds: Normal breath sounds.   Abdominal:      General: There is no distension.      Palpations: Abdomen is soft. There is no mass.      Tenderness: There is no abdominal tenderness.   Musculoskeletal:         General: Normal range of motion.      Cervical back: Normal range of motion and neck supple.   Skin:     General: Skin is warm.   Neurological:      General: No focal deficit present.      Mental Status: He is alert and oriented to " "person, place, and time.   Psychiatric:         Mood and Affect: Mood normal.         Lab Results - Last 18 Months   Lab Units 01/02/24  0744 12/11/23  0800 12/04/23  0816   WBC 10*3/mm3 4.20 7.81 12.90*   HEMOGLOBIN g/dL 8.3* 8.3* 9.6*   HEMATOCRIT % 26.6* 27.4* 29.5*   PLATELETS 10*3/mm3 170 306 60*   MCV fL 102.3* 98.9* 89.3     Lab Results - Last 18 Months   Lab Units 01/02/24  0806 01/02/24  0744 12/11/23  0820 12/11/23  0800 11/20/23  0829 11/20/23  0816   SODIUM mmol/L  --  137  --  138  --  137   POTASSIUM mmol/L  --  3.6  --  3.7  --  4.0   CHLORIDE mmol/L  --  102  --  102  --  101   CO2 mmol/L  --  25.0  --  25.0  --  24.0   BUN mg/dL  --  7  --  9  --  7   CREATININE mg/dL 1.00 0.88 0.90 0.86   < > 0.95   CALCIUM mg/dL  --  9.6  --  9.1  --  9.6   BILIRUBIN mg/dL  --  0.2  --  0.2  --  0.2   ALK PHOS U/L  --  65  --  66  --  86   ALT (SGPT) U/L  --  10  --  11  --  16   AST (SGOT) U/L  --  16  --  17  --  17   GLUCOSE mg/dL  --  156*  --  143*  --  192*    < > = values in this interval not displayed.       Lab Results   Component Value Date    GLUCOSE 156 (H) 01/02/2024    BUN 7 01/02/2024    CREATININE 1.00 01/02/2024    BCR 8.0 01/02/2024    K 3.6 01/02/2024    CO2 25.0 01/02/2024    CALCIUM 9.6 01/02/2024    ALBUMIN 4.0 01/02/2024    AST 16 01/02/2024    ALT 10 01/02/2024       Lab Results - Last 18 Months   Lab Units 12/04/23  0816   INR  0.99   APTT seconds 25.8       Lab Results   Component Value Date    IRON 34 (L) 10/22/2023    TIBC 174 (L) 10/22/2023    FERRITIN 546.30 (H) 10/20/2023       Lab Results   Component Value Date    FOLATE <2.00 (L) 10/21/2023       No results found for: \"OCCULTBLD\"    No results found for: \"RETICCTPCT\"  Lab Results   Component Value Date    [id number] 276 10/21/2023     No results found for: \"SPEP\", \"UPEP\"  No results found for: \"LDH\", \"URICACID\"  No results found for: \"ALMA\", \"RF\", \"SEDRATE\"  No results found for: \"FIBRINOGEN\", \"HAPTOGLOBIN\"  Lab Results   Component " "Value Date    PTT 25.8 12/04/2023    INR 0.99 12/04/2023     No results found for: \"\"  No results found for: \"CEA\"  No components found for: \"CA-19-9\"  No results found for: \"PSA\"  No results found for: \"SEDRATE\"    Assessment & Plan       Patient is a 58-year-old male with chronic smoking history who presents with cough, shortness of breath CT imaging concerning for a large right upper lobe mass mediastinal adenopathy biopsy positive for extensive stage small cell lung cancer     Extensive stage small cell lung cancer  Given osseous metastasis, brain metastasis extensive stage small cell lung cancer  Patient is not having any symptoms specific to his brain metastasis.  Holding off on SRS to the brain lesion  Started chemoimmunotherapy with carboplatin etoposide and Tecentriq  Tolerated c1 well with some fatigue. No significant nausea  C2 with fatigue  CT imaging and MRI brain with good response to treatment  Continue treatment  with C4  Repeat imaging after C4 and follow up    Brain Mets  Will repeat MRI after completion chemo and follow up with Dr. Goodrich    Anemia  Normocytic anemia  Iron level is low however ferritin is high which could be an acute phase reactant  Monitor CBC obtain complete iron profile including iron saturation B12 and folate levels  Low folate, start replacement.  Continue same     Time spent on encounter including record review, history taking, exam, discussion, counseling and documentation at: 40 minutes    Thank you very much for providing the opportunity to participate in this patient’s care. Please do not hesitate to call if there are any other questions.    "

## 2024-01-02 ENCOUNTER — HOSPITAL ENCOUNTER (OUTPATIENT)
Dept: ONCOLOGY | Facility: HOSPITAL | Age: 59
Discharge: HOME | End: 2024-01-02
Admitting: INTERNAL MEDICINE
Payer: COMMERCIAL

## 2024-01-02 ENCOUNTER — OFFICE VISIT (OUTPATIENT)
Dept: ONCOLOGY | Facility: CLINIC | Age: 59
End: 2024-01-02
Payer: COMMERCIAL

## 2024-01-02 VITALS
RESPIRATION RATE: 18 BRPM | SYSTOLIC BLOOD PRESSURE: 127 MMHG | HEART RATE: 118 BPM | WEIGHT: 150 LBS | BODY MASS INDEX: 22.22 KG/M2 | DIASTOLIC BLOOD PRESSURE: 76 MMHG | TEMPERATURE: 97.6 F | HEIGHT: 69 IN

## 2024-01-02 VITALS
WEIGHT: 150 LBS | TEMPERATURE: 97.6 F | BODY MASS INDEX: 22.15 KG/M2 | HEART RATE: 118 BPM | SYSTOLIC BLOOD PRESSURE: 127 MMHG | DIASTOLIC BLOOD PRESSURE: 76 MMHG

## 2024-01-02 DIAGNOSIS — C34.90 SMALL CELL LUNG CANCER: Primary | ICD-10-CM

## 2024-01-02 DIAGNOSIS — C79.31 METASTASIS TO BRAIN: ICD-10-CM

## 2024-01-02 DIAGNOSIS — Z45.2 ENCOUNTER FOR CENTRAL LINE CARE: ICD-10-CM

## 2024-01-02 LAB
ALBUMIN SERPL-MCNC: 4 G/DL (ref 3.5–5.2)
ALBUMIN/GLOB SERPL: 1.5 G/DL
ALP SERPL-CCNC: 65 U/L (ref 39–117)
ALT SERPL W P-5'-P-CCNC: 10 U/L (ref 1–41)
ANION GAP SERPL CALCULATED.3IONS-SCNC: 10 MMOL/L (ref 5–15)
AST SERPL-CCNC: 16 U/L (ref 1–40)
BASOPHILS # BLD AUTO: 0.02 10*3/MM3 (ref 0–0.2)
BASOPHILS NFR BLD AUTO: 0.5 % (ref 0–1.5)
BILIRUB SERPL-MCNC: 0.2 MG/DL (ref 0–1.2)
BUN SERPL-MCNC: 7 MG/DL (ref 6–20)
BUN/CREAT SERPL: 8 (ref 7–25)
CALCIUM SPEC-SCNC: 9.6 MG/DL (ref 8.6–10.5)
CHLORIDE SERPL-SCNC: 102 MMOL/L (ref 98–107)
CO2 SERPL-SCNC: 25 MMOL/L (ref 22–29)
CREAT BLDA-MCNC: 1 MG/DL (ref 0.6–1.3)
CREAT SERPL-MCNC: 0.88 MG/DL (ref 0.76–1.27)
DEPRECATED RDW RBC AUTO: 84.9 FL (ref 37–54)
EGFRCR SERPLBLD CKD-EPI 2021: 86.7 ML/MIN/1.73
EGFRCR SERPLBLD CKD-EPI 2021: 99.1 ML/MIN/1.73
EOSINOPHIL # BLD AUTO: 0.08 10*3/MM3 (ref 0–0.4)
EOSINOPHIL NFR BLD AUTO: 1.9 % (ref 0.3–6.2)
ERYTHROCYTE [DISTWIDTH] IN BLOOD BY AUTOMATED COUNT: 24 % (ref 12.3–15.4)
GLOBULIN UR ELPH-MCNC: 2.6 GM/DL
GLUCOSE BLDC GLUCOMTR-MCNC: 156 MG/DL (ref 70–105)
GLUCOSE SERPL-MCNC: 156 MG/DL (ref 65–99)
HCT VFR BLD AUTO: 26.6 % (ref 37.5–51)
HGB BLD-MCNC: 8.3 G/DL (ref 13–17.7)
LYMPHOCYTES # BLD AUTO: 1.11 10*3/MM3 (ref 0.7–3.1)
LYMPHOCYTES NFR BLD AUTO: 26.4 % (ref 19.6–45.3)
MCH RBC QN AUTO: 31.9 PG (ref 26.6–33)
MCHC RBC AUTO-ENTMCNC: 31.2 G/DL (ref 31.5–35.7)
MCV RBC AUTO: 102.3 FL (ref 79–97)
MONOCYTES # BLD AUTO: 0.55 10*3/MM3 (ref 0.1–0.9)
MONOCYTES NFR BLD AUTO: 13.1 % (ref 5–12)
NEUTROPHILS NFR BLD AUTO: 2.44 10*3/MM3 (ref 1.7–7)
NEUTROPHILS NFR BLD AUTO: 58.1 % (ref 42.7–76)
PLATELET # BLD AUTO: 170 10*3/MM3 (ref 140–450)
PMV BLD AUTO: 8.6 FL (ref 6–12)
POTASSIUM SERPL-SCNC: 3.6 MMOL/L (ref 3.5–5.2)
PROT SERPL-MCNC: 6.6 G/DL (ref 6–8.5)
RBC # BLD AUTO: 2.6 10*6/MM3 (ref 4.14–5.8)
SODIUM SERPL-SCNC: 137 MMOL/L (ref 136–145)
WBC NRBC COR # BLD AUTO: 4.2 10*3/MM3 (ref 3.4–10.8)

## 2024-01-02 PROCEDURE — 85025 COMPLETE CBC W/AUTO DIFF WBC: CPT | Performed by: INTERNAL MEDICINE

## 2024-01-02 PROCEDURE — 25810000003 SODIUM CHLORIDE 0.9 % SOLUTION 500 ML FLEX CONT: Performed by: INTERNAL MEDICINE

## 2024-01-02 PROCEDURE — 82948 REAGENT STRIP/BLOOD GLUCOSE: CPT

## 2024-01-02 PROCEDURE — 25010000002 PALONOSETRON 0.25 MG/5ML SOLUTION PREFILLED SYRINGE: Performed by: INTERNAL MEDICINE

## 2024-01-02 PROCEDURE — 80053 COMPREHEN METABOLIC PANEL: CPT | Performed by: INTERNAL MEDICINE

## 2024-01-02 PROCEDURE — 25010000002 DEXAMETHASONE SODIUM PHOSPHATE 120 MG/30ML SOLUTION: Performed by: INTERNAL MEDICINE

## 2024-01-02 PROCEDURE — 96417 CHEMO IV INFUS EACH ADDL SEQ: CPT

## 2024-01-02 PROCEDURE — 96367 TX/PROPH/DG ADDL SEQ IV INF: CPT

## 2024-01-02 PROCEDURE — 25010000002 CARBOPLATIN PER 50 MG: Performed by: INTERNAL MEDICINE

## 2024-01-02 PROCEDURE — 25010000002 HEPARIN LOCK FLUSH PER 10 UNITS: Performed by: INTERNAL MEDICINE

## 2024-01-02 PROCEDURE — 82565 ASSAY OF CREATININE: CPT

## 2024-01-02 PROCEDURE — 25010000002 ATEZOLIZUMAB 1200 MG/20ML SOLUTION 20 ML VIAL: Performed by: INTERNAL MEDICINE

## 2024-01-02 PROCEDURE — 25010000002 ETOPOSIDE 500 MG/25ML SOLUTION 25 ML VIAL: Performed by: INTERNAL MEDICINE

## 2024-01-02 PROCEDURE — 25010000002 FOSAPREPITANT PER 1 MG: Performed by: INTERNAL MEDICINE

## 2024-01-02 PROCEDURE — 25810000003 SODIUM CHLORIDE 0.9 % SOLUTION: Performed by: INTERNAL MEDICINE

## 2024-01-02 PROCEDURE — 96413 CHEMO IV INFUSION 1 HR: CPT

## 2024-01-02 PROCEDURE — 25810000003 SODIUM CHLORIDE 0.9 % SOLUTION 250 ML FLEX CONT: Performed by: INTERNAL MEDICINE

## 2024-01-02 PROCEDURE — 96375 TX/PRO/DX INJ NEW DRUG ADDON: CPT

## 2024-01-02 RX ORDER — HEPARIN SODIUM (PORCINE) LOCK FLUSH IV SOLN 100 UNIT/ML 100 UNIT/ML
500 SOLUTION INTRAVENOUS AS NEEDED
Status: DISCONTINUED | OUTPATIENT
Start: 2024-01-02 | End: 2024-01-03 | Stop reason: HOSPADM

## 2024-01-02 RX ORDER — SODIUM CHLORIDE 9 MG/ML
20 INJECTION, SOLUTION INTRAVENOUS ONCE
Status: CANCELLED | OUTPATIENT
Start: 2024-01-04

## 2024-01-02 RX ORDER — HEPARIN SODIUM (PORCINE) LOCK FLUSH IV SOLN 100 UNIT/ML 100 UNIT/ML
500 SOLUTION INTRAVENOUS AS NEEDED
Status: CANCELLED | OUTPATIENT
Start: 2024-01-02

## 2024-01-02 RX ORDER — PALONOSETRON 0.05 MG/ML
0.25 INJECTION, SOLUTION INTRAVENOUS ONCE
Status: COMPLETED | OUTPATIENT
Start: 2024-01-02 | End: 2024-01-02

## 2024-01-02 RX ORDER — SODIUM CHLORIDE 9 MG/ML
20 INJECTION, SOLUTION INTRAVENOUS ONCE
Status: CANCELLED | OUTPATIENT
Start: 2024-01-03

## 2024-01-02 RX ORDER — SODIUM CHLORIDE 9 MG/ML
20 INJECTION, SOLUTION INTRAVENOUS ONCE
Status: COMPLETED | OUTPATIENT
Start: 2024-01-02 | End: 2024-01-02

## 2024-01-02 RX ORDER — SODIUM CHLORIDE 0.9 % (FLUSH) 0.9 %
10 SYRINGE (ML) INJECTION AS NEEDED
Status: DISCONTINUED | OUTPATIENT
Start: 2024-01-02 | End: 2024-01-03 | Stop reason: HOSPADM

## 2024-01-02 RX ORDER — FAMOTIDINE 10 MG/ML
20 INJECTION, SOLUTION INTRAVENOUS AS NEEDED
Status: CANCELLED | OUTPATIENT
Start: 2024-01-02

## 2024-01-02 RX ORDER — SODIUM CHLORIDE 0.9 % (FLUSH) 0.9 %
10 SYRINGE (ML) INJECTION AS NEEDED
Status: CANCELLED | OUTPATIENT
Start: 2024-01-02

## 2024-01-02 RX ORDER — DIPHENHYDRAMINE HYDROCHLORIDE 50 MG/ML
50 INJECTION INTRAMUSCULAR; INTRAVENOUS AS NEEDED
Status: CANCELLED | OUTPATIENT
Start: 2024-01-02

## 2024-01-02 RX ADMIN — DEXAMETHASONE SODIUM PHOSPHATE 12 MG: 4 INJECTION, SOLUTION INTRA-ARTICULAR; INTRALESIONAL; INTRAMUSCULAR; INTRAVENOUS; SOFT TISSUE at 09:43

## 2024-01-02 RX ADMIN — HEPARIN 500 UNITS: 100 SYRINGE at 11:52

## 2024-01-02 RX ADMIN — ATEZOLIZUMAB 1200 MG: 1200 INJECTION, SOLUTION INTRAVENOUS at 09:04

## 2024-01-02 RX ADMIN — FOSAPREPITANT 100 ML: 150 INJECTION, POWDER, LYOPHILIZED, FOR SOLUTION INTRAVENOUS at 08:25

## 2024-01-02 RX ADMIN — Medication 10 ML: at 11:52

## 2024-01-02 RX ADMIN — PALONOSETRON 0.25 MG: 0.25 INJECTION, SOLUTION INTRAVENOUS at 09:41

## 2024-01-02 RX ADMIN — CARBOPLATIN 500 MG: 10 INJECTION INTRAVENOUS at 10:04

## 2024-01-02 RX ADMIN — SODIUM CHLORIDE 20 ML/HR: 9 INJECTION, SOLUTION INTRAVENOUS at 08:25

## 2024-01-02 RX ADMIN — ETOPOSIDE 180 MG: 20 INJECTION, SOLUTION INTRAVENOUS at 10:41

## 2024-01-03 ENCOUNTER — HOSPITAL ENCOUNTER (OUTPATIENT)
Dept: ONCOLOGY | Facility: HOSPITAL | Age: 59
Discharge: HOME | End: 2024-01-03
Admitting: INTERNAL MEDICINE
Payer: COMMERCIAL

## 2024-01-03 ENCOUNTER — OFFICE VISIT (OUTPATIENT)
Dept: ONCOLOGY | Facility: CLINIC | Age: 59
End: 2024-01-03
Payer: COMMERCIAL

## 2024-01-03 VITALS
WEIGHT: 154 LBS | TEMPERATURE: 97.8 F | BODY MASS INDEX: 22.81 KG/M2 | DIASTOLIC BLOOD PRESSURE: 71 MMHG | OXYGEN SATURATION: 93 % | RESPIRATION RATE: 18 BRPM | HEIGHT: 69 IN | SYSTOLIC BLOOD PRESSURE: 119 MMHG | HEART RATE: 117 BPM

## 2024-01-03 VITALS
WEIGHT: 154 LBS | RESPIRATION RATE: 18 BRPM | BODY MASS INDEX: 22.81 KG/M2 | SYSTOLIC BLOOD PRESSURE: 119 MMHG | TEMPERATURE: 97.8 F | OXYGEN SATURATION: 93 % | DIASTOLIC BLOOD PRESSURE: 71 MMHG | HEART RATE: 117 BPM | HEIGHT: 69 IN

## 2024-01-03 DIAGNOSIS — C34.90 SMALL CELL LUNG CANCER: Primary | ICD-10-CM

## 2024-01-03 DIAGNOSIS — C34.90 SMALL CELL LUNG CANCER: ICD-10-CM

## 2024-01-03 DIAGNOSIS — Z45.2 ENCOUNTER FOR CENTRAL LINE CARE: ICD-10-CM

## 2024-01-03 DIAGNOSIS — C79.31 METASTASIS TO BRAIN: ICD-10-CM

## 2024-01-03 DIAGNOSIS — M10.9 ACUTE GOUT INVOLVING TOE OF LEFT FOOT, UNSPECIFIED CAUSE: Primary | ICD-10-CM

## 2024-01-03 PROCEDURE — 25010000002 DEXAMETHASONE SODIUM PHOSPHATE 120 MG/30ML SOLUTION: Performed by: INTERNAL MEDICINE

## 2024-01-03 PROCEDURE — 96375 TX/PRO/DX INJ NEW DRUG ADDON: CPT

## 2024-01-03 PROCEDURE — 25810000003 SODIUM CHLORIDE 0.9 % SOLUTION 500 ML FLEX CONT: Performed by: INTERNAL MEDICINE

## 2024-01-03 PROCEDURE — 96413 CHEMO IV INFUSION 1 HR: CPT

## 2024-01-03 PROCEDURE — 25010000002 HEPARIN LOCK FLUSH PER 10 UNITS: Performed by: INTERNAL MEDICINE

## 2024-01-03 PROCEDURE — 96367 TX/PROPH/DG ADDL SEQ IV INF: CPT

## 2024-01-03 PROCEDURE — 25010000002 ETOPOSIDE 500 MG/25ML SOLUTION 25 ML VIAL: Performed by: INTERNAL MEDICINE

## 2024-01-03 PROCEDURE — 25810000003 SODIUM CHLORIDE 0.9 % SOLUTION: Performed by: INTERNAL MEDICINE

## 2024-01-03 RX ORDER — INDOMETHACIN 50 MG/1
50 CAPSULE ORAL 3 TIMES DAILY PRN
Qty: 15 CAPSULE | Refills: 0 | Status: SHIPPED | OUTPATIENT
Start: 2024-01-03 | End: 2024-01-08

## 2024-01-03 RX ORDER — PREDNISONE 20 MG/1
40 TABLET ORAL DAILY
Qty: 10 TABLET | Refills: 0 | Status: SHIPPED | OUTPATIENT
Start: 2024-01-03 | End: 2024-01-08

## 2024-01-03 RX ORDER — SODIUM CHLORIDE 9 MG/ML
20 INJECTION, SOLUTION INTRAVENOUS ONCE
Status: COMPLETED | OUTPATIENT
Start: 2024-01-03 | End: 2024-01-03

## 2024-01-03 RX ORDER — SODIUM CHLORIDE 0.9 % (FLUSH) 0.9 %
10 SYRINGE (ML) INJECTION AS NEEDED
Status: DISCONTINUED | OUTPATIENT
Start: 2024-01-03 | End: 2024-01-04 | Stop reason: HOSPADM

## 2024-01-03 RX ORDER — HEPARIN SODIUM (PORCINE) LOCK FLUSH IV SOLN 100 UNIT/ML 100 UNIT/ML
500 SOLUTION INTRAVENOUS AS NEEDED
Status: DISCONTINUED | OUTPATIENT
Start: 2024-01-03 | End: 2024-01-04 | Stop reason: HOSPADM

## 2024-01-03 RX ORDER — SODIUM CHLORIDE 0.9 % (FLUSH) 0.9 %
10 SYRINGE (ML) INJECTION AS NEEDED
Status: CANCELLED | OUTPATIENT
Start: 2024-01-03

## 2024-01-03 RX ORDER — HEPARIN SODIUM (PORCINE) LOCK FLUSH IV SOLN 100 UNIT/ML 100 UNIT/ML
500 SOLUTION INTRAVENOUS AS NEEDED
Status: CANCELLED | OUTPATIENT
Start: 2024-01-03

## 2024-01-03 RX ADMIN — HEPARIN 500 UNITS: 100 SYRINGE at 12:18

## 2024-01-03 RX ADMIN — SODIUM CHLORIDE 20 ML/HR: 9 INJECTION, SOLUTION INTRAVENOUS at 10:39

## 2024-01-03 RX ADMIN — DEXAMETHASONE SODIUM PHOSPHATE 12 MG: 4 INJECTION, SOLUTION INTRA-ARTICULAR; INTRALESIONAL; INTRAMUSCULAR; INTRAVENOUS; SOFT TISSUE at 10:39

## 2024-01-03 RX ADMIN — Medication 10 ML: at 12:18

## 2024-01-03 RX ADMIN — ETOPOSIDE 180 MG: 20 INJECTION, SOLUTION INTRAVENOUS at 11:07

## 2024-01-03 NOTE — PROGRESS NOTES
Patient is here for C4D2 etoposide. Port was left accessed from treatment yesterday and had positive blood return. Patient's only complaint today was that his gout is flaring up and wanted some medicine for it. Dr. Rubio said ok to treat today and he wanted akila FALCON to see the patient. Akila at bedside to assess patient. Patient wanted to keep port in for treatment today. Port had positive blood return and was flushed with NS and heparin prior to discharge and the green caps were placed. Patient tolerated treatment and stan FONTANA Is aware patient still needs a follow-up scheduled with Dr. Rubio and stated it will be on the patient's AVS tomorrow after treatment.

## 2024-01-03 NOTE — PROGRESS NOTES
HEMATOLOGY ONCOLOGY OUTPATIENT FOLLOW UP       Patient name: Romero Maciel  : 1965  MRN: [id number]  Primary Care Physician: Jennifer Maynard APRN  Referring Physician: No ref. provider found  Reason For Consult:         History of Present Illness:    Romero Maciel is a 58 y.o. male who presented to [facility] on 10/20/2023 with complaints of cough, abnormal chest xray.     Patient is a 58-year-old male with history of 60 pack year smoking who has been complaining of shortness of breath, cough weight loss of over 20 pounds over the last few months.  He also started to have hoarseness of voice.  He was seen by PCP and a chest x-ray was ordered which was abnormal patient was advised to come to the ER     10/20/2023 CT chest with very large right upper lobe mass which extends into the right hilum and mediastinum with gross mediastinal adenopathy findings compatible with malignancy.  Postobstructive infiltrates.  Minimal right pleural effusion    10/22/2023 -bronchoscopy with endobronchial biopsy of the mass in the right mainstem bronchus biopsy positive for small cell lung cancer    10/22/2023 -MRI brain with 1.7 cm peripherally enhancing mass within the right occipital lobe with mild surrounding edema concerning for brain metastasis additional findings compatible with chronic microvascular ischemic changes.  Patient was started on dexamethasone    10/26/2023 -PET/CT with large right upper lobe hypermetabolic mass extending to the right hilum consistent with malignancy mass encases the right mainstem bronchus and lobar bronchi with areas of cavitating consolidation in the posterior right upper lobe and groundglass opacities in the right upper lobe likely postobstructive pneumonia.  Confluent hypermetabolic mediastinal adenopathy in the right paratracheal, subcarinal and right hilar region consistent with metastatic adenopathy.  Large right paratracheal logan masses mass effect on the SVC.   Hypermetabolic left paratracheal and right supraclavicular metastatic adenopathy.  No hypermetabolic adenopathy in the abdomen or pelvis.  Multiple hypermetabolic osseous lesions consistent with osseous metastasis.    10/31/23 - started chemoimmunotherapy tolerated well   11/20/23 - C2    12/7/23 - CT CAP with positive response to treatmentFindings are present likely reflecting treatment response with interval decrease in size of previously noted extensive supraclavicular and mediastinal adenopathy. Also mildly decreased size of the previously noted invasive right hilar and mediastinal mass. There is some persistent narrowing of the right mainstem bronchus and right main pulmonary artery without definite occlusion or focal filling defect. Postobstructive changes in the right lung are mildly improved.  12/7/23 - MRI brain with improvement in metastasis and edema.    1/2/2024 - C4D1 of carbo etoposide and Tecentriq    Subjective:  Patient complains of left first metatarsal pain particularly in the joint.  He he reports he does have a history of gout.  Reports it has been several years since his last flare as he has stopped drinking alcohol and has cut down on his various trigger foods.  Ports the pain is sharp and throbbing.  Denies any known injury.  Denies ankle pain or swelling.    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       Past Surgical History:   Procedure Laterality Date    BRONCHOSCOPY N/A 10/22/2023    Procedure: BRONCHOSCOPY WITH CRYO BIOPSY X1 AREA,  FINE NEEDLE ASPIRATION, AND BRONCHOALVEOLAR LAVAGE;  Surgeon: Genia Jenkins MD;  Location: [facility] ENDOSCOPY;  Service: Pulmonary;  Laterality: N/A;  POST: LUNG MASS         Current Outpatient Medications:     budesonide (PULMICORT) 0.5 MG/2ML nebulizer solution, Take 2 mL by nebulization 2 (Two) Times a Day., Disp: 120 mL, Rfl: 0    clonazePAM (KlonoPIN) 1  MG tablet, Take 1 tablet by mouth 2 (Two) Times a Day As Needed., Disp: , Rfl:     doxepin (SINEquan) 75 MG capsule, Take 1 capsule by mouth Every Night., Disp: , Rfl:     guaiFENesin (MUCINEX) 600 MG 12 hr tablet, Take 2 tablets by mouth 2 (Two) Times a Day. (Patient not taking: Reported on 10/25/2023), Disp: , Rfl:     indomethacin (INDOCIN) 50 MG capsule, Take 1 capsule by mouth 3 (Three) Times a Day As Needed for Mild Pain for up to 5 days., Disp: 15 capsule, Rfl: 0    ipratropium-albuterol (DUO-NEB) 0.5-2.5 mg/3 ml nebulizer, Take 3 mL by nebulization Every 4 (Four) Hours As Needed for Wheezing., Disp: 180 mL, Rfl: 0    lidocaine-prilocaine (EMLA) 2.5-2.5 % cream, Apply 1 application  topically to the appropriate area as directed See Admin Instructions. Apply to port site one hour prior to port being accessed., Disp: 30 g, Rfl: 3    LORazepam (Ativan) 1 MG tablet, Take 1 tablet by mouth Daily. On MRI and Radiation days, Disp: 4 tablet, Rfl: 0    mirtazapine (REMERON) 45 MG tablet, Take 1 tablet by mouth Every Night., Disp: , Rfl:     OLANZapine (zyPREXA) 5 MG tablet, Take 1 tablet by mouth Every Night. Take nightly x 4 starting night of chemotherapy., Disp: 4 tablet, Rfl: 3    ondansetron (ZOFRAN) 8 MG tablet, Take 1 tablet by mouth 3 (Three) Times a Day As Needed for Nausea or Vomiting., Disp: 30 tablet, Rfl: 5    predniSONE (DELTASONE) 20 MG tablet, Take 2 tablets by mouth Daily for 5 days., Disp: 10 tablet, Rfl: 0    promethazine (PHENERGAN) 25 MG tablet, Take 1 tablet by mouth Every 8 (Eight) Hours As Needed for Nausea or Vomiting for up to 90 days. Alternate taking with Zofran., Disp: 90 tablet, Rfl: 0  No current facility-administered medications for this visit.    Facility-Administered Medications Ordered in Other Visits:     etoposide (TOPOSAR) 180 mg in sodium chloride 0.9 % 509 mL chemo IVPB, 100 mg/m2 (Treatment Plan Recorded), Intravenous, Once, Jeanette Rubio MD, Last Rate: 510 mL/hr at 01/03/24  "1107, 180 mg at 24 1107    heparin injection 500 Units, 500 Units, Intravenous, PRN, Jeanette Rubio MD    sodium chloride 0.9 % flush 10 mL, 10 mL, Intravenous, PRNJoanne Amitoj, MD    Allergies   Allergen Reactions    Penicillins Anaphylaxis       Family History   Problem Relation Age of Onset    Lung cancer Mother          at age 58    Lung cancer Brother          at age 40       Cancer-related family history includes Lung cancer in his brother and mother.    Social History     Tobacco Use    Smoking status: Former     Packs/day: 1.00     Years: 15.00     Additional pack years: 0.00     Total pack years: 15.00     Types: Cigarettes     Quit date: 10/2023     Years since quittin.2     Passive exposure: Current    Smokeless tobacco: Current   Vaping Use    Vaping Use: Never used   Substance Use Topics    Alcohol use: Never    Drug use: Never     Social History     Social History Narrative    Not on file        Objective:  Vital signs:  Vitals:    24 1031   BP: 119/71   Pulse: 117   Resp: 18   Temp: 97.8 °F (36.6 °C)   TempSrc: Oral   SpO2: 93%   Weight: 69.9 kg (154 lb)   Height: 175.3 cm (69\")   PainSc: 0-No pain             Body mass index is 22.74 kg/m².  ECOG  (1) Restricted in physically strenuous activity, ambulatory and able to do work of light nature    Physical Exam:   Physical Exam  Constitutional:       Appearance: Normal appearance. He is not toxic-appearing.   HENT:      Head: Normocephalic and atraumatic.   Eyes:      Pupils: Pupils are equal, round, and reactive to light.   Cardiovascular:      Rate and Rhythm: Normal rate and regular rhythm.      Pulses: Normal pulses.      Heart sounds: No murmur heard.  Pulmonary:      Effort: Pulmonary effort is normal.      Breath sounds: Normal breath sounds.   Abdominal:      General: There is no distension.      Palpations: Abdomen is soft. There is no mass.      Tenderness: There is no abdominal tenderness.   Musculoskeletal:         " General: Tenderness (Mild tenderness to first metatarsal phalangeal joint on left foot.) present. No swelling or signs of injury. Normal range of motion.      Cervical back: Normal range of motion and neck supple.   Skin:     General: Skin is warm.      Findings: No erythema, lesion or rash.   Neurological:      General: No focal deficit present.      Mental Status: He is alert and oriented to person, place, and time.   Psychiatric:         Mood and Affect: Mood normal.         Lab Results - Last 18 Months   Lab Units 01/02/24  0744 12/11/23  0800 12/04/23  0816   WBC 10*3/mm3 4.20 7.81 12.90*   HEMOGLOBIN g/dL 8.3* 8.3* 9.6*   HEMATOCRIT % 26.6* 27.4* 29.5*   PLATELETS 10*3/mm3 170 306 60*   MCV fL 102.3* 98.9* 89.3     Lab Results - Last 18 Months   Lab Units 01/02/24  0806 01/02/24  0744 12/11/23  0820 12/11/23  0800 11/20/23  0829 11/20/23  0816   SODIUM mmol/L  --  137  --  138  --  137   POTASSIUM mmol/L  --  3.6  --  3.7  --  4.0   CHLORIDE mmol/L  --  102  --  102  --  101   CO2 mmol/L  --  25.0  --  25.0  --  24.0   BUN mg/dL  --  7  --  9  --  7   CREATININE mg/dL 1.00 0.88 0.90 0.86   < > 0.95   CALCIUM mg/dL  --  9.6  --  9.1  --  9.6   BILIRUBIN mg/dL  --  0.2  --  0.2  --  0.2   ALK PHOS U/L  --  65  --  66  --  86   ALT (SGPT) U/L  --  10  --  11  --  16   AST (SGOT) U/L  --  16  --  17  --  17   GLUCOSE mg/dL  --  156*  --  143*  --  192*    < > = values in this interval not displayed.       Lab Results   Component Value Date    GLUCOSE 156 (H) 01/02/2024    BUN 7 01/02/2024    CREATININE 1.00 01/02/2024    BCR 8.0 01/02/2024    K 3.6 01/02/2024    CO2 25.0 01/02/2024    CALCIUM 9.6 01/02/2024    ALBUMIN 4.0 01/02/2024    AST 16 01/02/2024    ALT 10 01/02/2024       Lab Results - Last 18 Months   Lab Units 12/04/23  0816   INR  0.99   APTT seconds 25.8       Lab Results   Component Value Date    IRON 34 (L) 10/22/2023    TIBC 174 (L) 10/22/2023    FERRITIN 546.30 (H) 10/20/2023       Lab Results  "  Component Value Date    FOLATE <2.00 (L) 10/21/2023       No results found for: \"OCCULTBLD\"    No results found for: \"RETICCTPCT\"  Lab Results   Component Value Date    [id number] 276 10/21/2023     No results found for: \"SPEP\", \"UPEP\"  No results found for: \"LDH\", \"URICACID\"  No results found for: \"ALMA\", \"RF\", \"SEDRATE\"  No results found for: \"FIBRINOGEN\", \"HAPTOGLOBIN\"  Lab Results   Component Value Date    PTT 25.8 12/04/2023    INR 0.99 12/04/2023     No results found for: \"\"  No results found for: \"CEA\"  No components found for: \"CA-19-9\"  No results found for: \"PSA\"  No results found for: \"SEDRATE\"    Assessment & Plan       Patient is a 58-year-old male with chronic smoking history who presents with cough, shortness of breath CT imaging concerning for a large right upper lobe mass mediastinal adenopathy biopsy positive for extensive stage small cell lung cancer     Extensive stage small cell lung cancer  Given osseous metastasis, brain metastasis extensive stage small cell lung cancer  Patient is not having any symptoms specific to his brain metastasis.  Holding off on SRS to the brain lesion  Started chemoimmunotherapy with carboplatin etoposide and Tecentriq  Tolerated c1 well with some fatigue. No significant nausea  C2 with fatigue  CT imaging and MRI brain with good response to treatment  Continue treatment  with C4, etoposide today  Repeat imaging after C4 and follow up    Brain Mets  Will repeat MRI after completion chemo and follow up with Dr. Goodrich    Anemia  Normocytic anemia  Iron level is low however ferritin is high which could be an acute phase reactant  Monitor CBC obtain complete iron profile including iron saturation B12 and folate levels  Low folate, start replacement.  Continue same    Gout flare  Plan for short course of prednisone and indomethacin.    If no improvement consider imaging       Time spent on encounter including record review, history taking, exam, discussion, counseling " and documentation at: 30 minutes    Thank you very much for providing the opportunity to participate in this patient’s care. Please do not hesitate to call if there are any other questions.

## 2024-01-04 ENCOUNTER — HOSPITAL ENCOUNTER (OUTPATIENT)
Dept: ONCOLOGY | Facility: HOSPITAL | Age: 59
Discharge: HOME | End: 2024-01-04
Admitting: INTERNAL MEDICINE
Payer: COMMERCIAL

## 2024-01-04 VITALS
HEART RATE: 105 BPM | RESPIRATION RATE: 16 BRPM | WEIGHT: 154 LBS | BODY MASS INDEX: 22.81 KG/M2 | DIASTOLIC BLOOD PRESSURE: 59 MMHG | HEIGHT: 69 IN | OXYGEN SATURATION: 94 % | TEMPERATURE: 97.1 F | SYSTOLIC BLOOD PRESSURE: 120 MMHG

## 2024-01-04 DIAGNOSIS — Z45.2 ENCOUNTER FOR CENTRAL LINE CARE: ICD-10-CM

## 2024-01-04 DIAGNOSIS — C79.31 METASTASIS TO BRAIN: ICD-10-CM

## 2024-01-04 DIAGNOSIS — C34.90 SMALL CELL LUNG CANCER: Primary | ICD-10-CM

## 2024-01-04 PROCEDURE — 25010000002 DEXAMETHASONE SODIUM PHOSPHATE 120 MG/30ML SOLUTION: Performed by: INTERNAL MEDICINE

## 2024-01-04 PROCEDURE — 96413 CHEMO IV INFUSION 1 HR: CPT

## 2024-01-04 PROCEDURE — 25810000003 SODIUM CHLORIDE 0.9 % SOLUTION: Performed by: INTERNAL MEDICINE

## 2024-01-04 PROCEDURE — 25010000002 HEPARIN LOCK FLUSH PER 10 UNITS: Performed by: INTERNAL MEDICINE

## 2024-01-04 PROCEDURE — 96375 TX/PRO/DX INJ NEW DRUG ADDON: CPT

## 2024-01-04 PROCEDURE — 25810000003 SODIUM CHLORIDE 0.9 % SOLUTION 500 ML FLEX CONT: Performed by: INTERNAL MEDICINE

## 2024-01-04 PROCEDURE — 96367 TX/PROPH/DG ADDL SEQ IV INF: CPT

## 2024-01-04 PROCEDURE — 25010000002 ETOPOSIDE 500 MG/25ML SOLUTION 25 ML VIAL: Performed by: INTERNAL MEDICINE

## 2024-01-04 RX ORDER — HEPARIN SODIUM (PORCINE) LOCK FLUSH IV SOLN 100 UNIT/ML 100 UNIT/ML
500 SOLUTION INTRAVENOUS AS NEEDED
Status: DISCONTINUED | OUTPATIENT
Start: 2024-01-04 | End: 2024-01-05 | Stop reason: HOSPADM

## 2024-01-04 RX ORDER — SODIUM CHLORIDE 0.9 % (FLUSH) 0.9 %
10 SYRINGE (ML) INJECTION AS NEEDED
OUTPATIENT
Start: 2024-01-04

## 2024-01-04 RX ORDER — SODIUM CHLORIDE 9 MG/ML
20 INJECTION, SOLUTION INTRAVENOUS ONCE
Status: COMPLETED | OUTPATIENT
Start: 2024-01-04 | End: 2024-01-04

## 2024-01-04 RX ORDER — HEPARIN SODIUM (PORCINE) LOCK FLUSH IV SOLN 100 UNIT/ML 100 UNIT/ML
500 SOLUTION INTRAVENOUS AS NEEDED
OUTPATIENT
Start: 2024-01-04

## 2024-01-04 RX ORDER — SODIUM CHLORIDE 0.9 % (FLUSH) 0.9 %
10 SYRINGE (ML) INJECTION AS NEEDED
Status: DISCONTINUED | OUTPATIENT
Start: 2024-01-04 | End: 2024-01-05 | Stop reason: HOSPADM

## 2024-01-04 RX ADMIN — ETOPOSIDE 180 MG: 20 INJECTION, SOLUTION INTRAVENOUS at 10:37

## 2024-01-04 RX ADMIN — Medication 10 ML: at 11:50

## 2024-01-04 RX ADMIN — HEPARIN 500 UNITS: 100 SYRINGE at 11:51

## 2024-01-04 RX ADMIN — SODIUM CHLORIDE 20 ML/HR: 9 INJECTION, SOLUTION INTRAVENOUS at 10:11

## 2024-01-04 RX ADMIN — DEXAMETHASONE SODIUM PHOSPHATE 12 MG: 4 INJECTION, SOLUTION INTRA-ARTICULAR; INTRALESIONAL; INTRAMUSCULAR; INTRAVENOUS; SOFT TISSUE at 10:11

## 2024-01-05 ENCOUNTER — HOSPITAL ENCOUNTER (OUTPATIENT)
Dept: ONCOLOGY | Facility: HOSPITAL | Age: 59
Discharge: HOME | End: 2024-01-05
Payer: COMMERCIAL

## 2024-01-05 VITALS — HEART RATE: 86 BPM | SYSTOLIC BLOOD PRESSURE: 132 MMHG | DIASTOLIC BLOOD PRESSURE: 58 MMHG

## 2024-01-05 DIAGNOSIS — C34.90 SMALL CELL LUNG CANCER: Primary | ICD-10-CM

## 2024-01-05 PROCEDURE — 96372 THER/PROPH/DIAG INJ SC/IM: CPT

## 2024-01-05 PROCEDURE — 25010000002 PEGFILGRASTIM-JMDB 6 MG/0.6ML SOLUTION PREFILLED SYRINGE: Performed by: INTERNAL MEDICINE

## 2024-01-05 RX ADMIN — PEGFILGRASTIM-JMDB 6 MG: 6 INJECTION SUBCUTANEOUS at 15:39

## 2024-01-05 NOTE — PROGRESS NOTES
Patient here for Fulphila injection and patient request that injection warm up for 25 minutes and I complied. Patient received Fulphila injection and tolerated injection well. Patient aware of next appointment.

## 2024-01-15 ENCOUNTER — HOSPITAL ENCOUNTER (OUTPATIENT)
Dept: PET IMAGING | Facility: HOSPITAL | Age: 59
Discharge: HOME | End: 2024-01-15
Admitting: INTERNAL MEDICINE
Payer: COMMERCIAL

## 2024-01-15 DIAGNOSIS — C34.90 SMALL CELL LUNG CANCER: ICD-10-CM

## 2024-01-15 DIAGNOSIS — C79.31 METASTASIS TO BRAIN: ICD-10-CM

## 2024-01-15 PROCEDURE — 74177 CT ABD & PELVIS W/CONTRAST: CPT

## 2024-01-15 PROCEDURE — 71260 CT THORAX DX C+: CPT

## 2024-01-15 PROCEDURE — 25510000001 IOPAMIDOL PER 1 ML: Performed by: INTERNAL MEDICINE

## 2024-01-15 RX ADMIN — IOPAMIDOL 100 ML: 755 INJECTION, SOLUTION INTRAVENOUS at 14:16

## 2024-01-15 NOTE — PROGRESS NOTES
HEMATOLOGY ONCOLOGY OUTPATIENT FOLLOW UP       Patient name: Romero Maciel  : 1965  MRN: [id number]  Primary Care Physician: Jennifer Maynard APRN  Referring Physician: No ref. provider found  Reason For Consult:         History of Present Illness:    Romero Maciel is a 58 y.o. male who presented to [facility] on 10/20/2023 with complaints of cough, abnormal chest xray.     Patient is a 58-year-old male with history of 60 pack year smoking who has been complaining of shortness of breath, cough weight loss of over 20 pounds over the last few months.  He also started to have hoarseness of voice.  He was seen by PCP and a chest x-ray was ordered which was abnormal patient was advised to come to the ER     10/20/2023 CT chest with very large right upper lobe mass which extends into the right hilum and mediastinum with gross mediastinal adenopathy findings compatible with malignancy.  Postobstructive infiltrates.  Minimal right pleural effusion    10/22/2023 -bronchoscopy with endobronchial biopsy of the mass in the right mainstem bronchus biopsy positive for small cell lung cancer    10/22/2023 -MRI brain with 1.7 cm peripherally enhancing mass within the right occipital lobe with mild surrounding edema concerning for brain metastasis additional findings compatible with chronic microvascular ischemic changes.  Patient was started on dexamethasone    10/26/2023 -PET/CT with large right upper lobe hypermetabolic mass extending to the right hilum consistent with malignancy mass encases the right mainstem bronchus and lobar bronchi with areas of cavitating consolidation in the posterior right upper lobe and groundglass opacities in the right upper lobe likely postobstructive pneumonia.  Confluent hypermetabolic mediastinal adenopathy in the right paratracheal, subcarinal and right hilar region consistent with metastatic adenopathy.  Large right paratracheal loagn masses mass effect on the SVC.   Hypermetabolic left paratracheal and right supraclavicular metastatic adenopathy.  No hypermetabolic adenopathy in the abdomen or pelvis.  Multiple hypermetabolic osseous lesions consistent with osseous metastasis.    10/31/23 - started chemoimmunotherapy tolerated well   11/20/23 - C2    12/7/23 - CT CAP with positive response to treatmentFindings are present likely reflecting treatment response with interval decrease in size of previously noted extensive supraclavicular and mediastinal adenopathy. Also mildly decreased size of the previously noted invasive right hilar and mediastinal mass. There is some persistent narrowing of the right mainstem bronchus and right main pulmonary artery without definite occlusion or focal filling defect. Postobstructive changes in the right lung are mildly improved.  12/7/23 - MRI brain with improvement in metastasis and edema.    1/15/24 - positive response to therapy, decrease lymph nodes.     Subjective:  Increase wheezing . Shortness of breath, no cough.     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       Past Surgical History:   Procedure Laterality Date    BRONCHOSCOPY N/A 10/22/2023    Procedure: BRONCHOSCOPY WITH CRYO BIOPSY X1 AREA,  FINE NEEDLE ASPIRATION, AND BRONCHOALVEOLAR LAVAGE;  Surgeon: Genia Jenkins MD;  Location: [facility] ENDOSCOPY;  Service: Pulmonary;  Laterality: N/A;  POST: LUNG MASS         Current Outpatient Medications:     budesonide (PULMICORT) 0.5 MG/2ML nebulizer solution, Take 2 mL by nebulization 2 (Two) Times a Day., Disp: 120 mL, Rfl: 0    clonazePAM (KlonoPIN) 1 MG tablet, Take 1 tablet by mouth 2 (Two) Times a Day As Needed., Disp: , Rfl:     doxepin (SINEquan) 75 MG capsule, Take 1 capsule by mouth Every Night., Disp: , Rfl:     guaiFENesin (MUCINEX) 600 MG 12 hr tablet, Take 2 tablets by mouth 2 (Two) Times a Day. (Patient not taking: Reported on  10/25/2023), Disp: , Rfl:     ipratropium-albuterol (DUO-NEB) 0.5-2.5 mg/3 ml nebulizer, Take 3 mL by nebulization Every 4 (Four) Hours As Needed for Wheezing., Disp: 180 mL, Rfl: 0    lidocaine-prilocaine (EMLA) 2.5-2.5 % cream, Apply 1 application  topically to the appropriate area as directed See Admin Instructions. Apply to port site one hour prior to port being accessed., Disp: 30 g, Rfl: 3    LORazepam (Ativan) 1 MG tablet, Take 1 tablet by mouth Daily. On MRI and Radiation days, Disp: 4 tablet, Rfl: 0    mirtazapine (REMERON) 45 MG tablet, Take 1 tablet by mouth Every Night., Disp: , Rfl:     OLANZapine (zyPREXA) 5 MG tablet, Take 1 tablet by mouth Every Night. Take nightly x 4 starting night of chemotherapy., Disp: 4 tablet, Rfl: 3    ondansetron (ZOFRAN) 8 MG tablet, Take 1 tablet by mouth 3 (Three) Times a Day As Needed for Nausea or Vomiting., Disp: 30 tablet, Rfl: 5    promethazine (PHENERGAN) 25 MG tablet, Take 1 tablet by mouth Every 8 (Eight) Hours As Needed for Nausea or Vomiting for up to 90 days. Alternate taking with Zofran., Disp: 90 tablet, Rfl: 0  No current facility-administered medications for this visit.    Allergies   Allergen Reactions    Penicillins Anaphylaxis       Family History   Problem Relation Age of Onset    Lung cancer Mother          at age 58    Lung cancer Brother          at age 40       Cancer-related family history includes Lung cancer in his brother and mother.    Social History     Tobacco Use    Smoking status: Former     Packs/day: 1.00     Years: 15.00     Additional pack years: 0.00     Total pack years: 15.00     Types: Cigarettes     Quit date: 10/2023     Years since quittin.2     Passive exposure: Current    Smokeless tobacco: Current   Vaping Use    Vaping Use: Never used   Substance Use Topics    Alcohol use: Never    Drug use: Never     Social History     Social History Narrative    Not on file        Objective:  Vital signs:  There were no vitals  filed for this visit.          There is no height or weight on file to calculate BMI.  ECOG  (1) Restricted in physically strenuous activity, ambulatory and able to do work of light nature    Physical Exam:   Physical Exam  Constitutional:       Appearance: Normal appearance.   HENT:      Head: Normocephalic and atraumatic.   Eyes:      Pupils: Pupils are equal, round, and reactive to light.   Cardiovascular:      Rate and Rhythm: Normal rate and regular rhythm.      Pulses: Normal pulses.      Heart sounds: Normal heart sounds. No murmur heard.  Pulmonary:      Effort: Pulmonary effort is normal.      Breath sounds: Wheezing and rhonchi present.   Abdominal:      General: There is no distension.      Palpations: Abdomen is soft. There is no mass.      Tenderness: There is no abdominal tenderness.   Musculoskeletal:         General: Normal range of motion.      Cervical back: Normal range of motion and neck supple.   Skin:     General: Skin is warm.   Neurological:      General: No focal deficit present.      Mental Status: He is alert and oriented to person, place, and time.   Psychiatric:         Mood and Affect: Mood normal.         Lab Results - Last 18 Months   Lab Units 01/02/24  0744 12/11/23  0800 12/04/23  0816   WBC 10*3/mm3 4.20 7.81 12.90*   HEMOGLOBIN g/dL 8.3* 8.3* 9.6*   HEMATOCRIT % 26.6* 27.4* 29.5*   PLATELETS 10*3/mm3 170 306 60*   MCV fL 102.3* 98.9* 89.3     Lab Results - Last 18 Months   Lab Units 01/02/24  0806 01/02/24  0744 12/11/23  0820 12/11/23  0800 11/20/23  0829 11/20/23  0816   SODIUM mmol/L  --  137  --  138  --  137   POTASSIUM mmol/L  --  3.6  --  3.7  --  4.0   CHLORIDE mmol/L  --  102  --  102  --  101   CO2 mmol/L  --  25.0  --  25.0  --  24.0   BUN mg/dL  --  7  --  9  --  7   CREATININE mg/dL 1.00 0.88 0.90 0.86   < > 0.95   CALCIUM mg/dL  --  9.6  --  9.1  --  9.6   BILIRUBIN mg/dL  --  0.2  --  0.2  --  0.2   ALK PHOS U/L  --  65  --  66  --  86   ALT (SGPT) U/L  --  10  --   "11  --  16   AST (SGOT) U/L  --  16  --  17  --  17   GLUCOSE mg/dL  --  156*  --  143*  --  192*    < > = values in this interval not displayed.       Lab Results   Component Value Date    GLUCOSE 156 (H) 01/02/2024    BUN 7 01/02/2024    CREATININE 1.00 01/02/2024    BCR 8.0 01/02/2024    K 3.6 01/02/2024    CO2 25.0 01/02/2024    CALCIUM 9.6 01/02/2024    ALBUMIN 4.0 01/02/2024    AST 16 01/02/2024    ALT 10 01/02/2024       Lab Results - Last 18 Months   Lab Units 12/04/23  0816   INR  0.99   APTT seconds 25.8       Lab Results   Component Value Date    IRON 34 (L) 10/22/2023    TIBC 174 (L) 10/22/2023    FERRITIN 546.30 (H) 10/20/2023       Lab Results   Component Value Date    FOLATE <2.00 (L) 10/21/2023       No results found for: \"OCCULTBLD\"    No results found for: \"RETICCTPCT\"  Lab Results   Component Value Date    [id number] 276 10/21/2023     No results found for: \"SPEP\", \"UPEP\"  No results found for: \"LDH\", \"URICACID\"  No results found for: \"ALMA\", \"RF\", \"SEDRATE\"  No results found for: \"FIBRINOGEN\", \"HAPTOGLOBIN\"  Lab Results   Component Value Date    PTT 25.8 12/04/2023    INR 0.99 12/04/2023     No results found for: \"\"  No results found for: \"CEA\"  No components found for: \"CA-19-9\"  No results found for: \"PSA\"  No results found for: \"SEDRATE\"    Assessment & Plan       Patient is a 59-year-old male with chronic smoking history who presents with cough, shortness of breath CT imaging concerning for a large right upper lobe mass mediastinal adenopathy biopsy positive for extensive stage small cell lung cancer     Extensive stage small cell lung cancer  Given osseous metastasis, brain metastasis extensive stage small cell lung cancer  Patient is not having any symptoms specific to his brain metastasis.  Holding off on SRS to the brain lesion  Started chemoimmunotherapy with carboplatin etoposide and Tecentriq  Tolerated c1 well with some fatigue. No significant nausea  C2 with fatigue  CT imaging " and MRI brain with good response to treatment  Repeat CT now with ongoing response  Continue immunotherapy only    Brain Mets  F/u Dr. Goodrich  Imaging with response.    Anemia  Normocytic anemia  Iron level is low however ferritin is high which could be an acute phase reactant  Monitor CBC obtain complete iron profile including iron saturation B12 and folate levels  Low folate, start replacement.  Continue same     COPD exacerbation  Short dose prednisone  Albuterol    Thank you very much for providing the opportunity to participate in this patient’s care. Please do not hesitate to call if there are any other questions.  Time spent on encounter including record review, history taking, exam, discussion, counseling and documentation at: 40 minutes

## 2024-01-20 DIAGNOSIS — C34.90 SMALL CELL LUNG CANCER: ICD-10-CM

## 2024-01-22 ENCOUNTER — TELEPHONE (OUTPATIENT)
Dept: ONCOLOGY | Facility: CLINIC | Age: 59
End: 2024-01-22

## 2024-01-22 RX ORDER — OLANZAPINE 5 MG/1
5 TABLET ORAL NIGHTLY
Qty: 4 TABLET | Refills: 3 | Status: SHIPPED | OUTPATIENT
Start: 2024-01-22

## 2024-01-22 NOTE — TELEPHONE ENCOUNTER
Caller: Vaishnavi Maciel    Relationship: Emergency Contact    Best call back number: [phone]     What is the best time to reach you: ASAP    Who are you requesting to speak with (clinical staff, provider,  specific staff member): CLINICAL    What was the call regarding: PT HAS APPT FOR TOMORROW AND THEY ARE NEEDING TO CREATE A PLAN FOR HIS TREATMENT DUE TO GETTING TRANSPORTATION. THEY ARE NEEDING TO KNOW IF THEY ARE GOING TO NEED 3 MORE ADDITIONAL APPTS THIS WEEK LIKE BEFORE? THE APPT FOR TOMORROW LOOKS DIFFERENT AND THEY ARE JUST NEEDING TO KNOW WHAT TO EXPECT?

## 2024-01-23 ENCOUNTER — HOSPITAL ENCOUNTER (OUTPATIENT)
Dept: ONCOLOGY | Facility: HOSPITAL | Age: 59
Discharge: HOME | End: 2024-01-23
Admitting: INTERNAL MEDICINE
Payer: COMMERCIAL

## 2024-01-23 ENCOUNTER — OFFICE VISIT (OUTPATIENT)
Dept: ONCOLOGY | Facility: CLINIC | Age: 59
End: 2024-01-23
Payer: COMMERCIAL

## 2024-01-23 VITALS
BODY MASS INDEX: 22.66 KG/M2 | HEIGHT: 69 IN | RESPIRATION RATE: 18 BRPM | OXYGEN SATURATION: 94 % | HEART RATE: 114 BPM | DIASTOLIC BLOOD PRESSURE: 67 MMHG | WEIGHT: 153 LBS | SYSTOLIC BLOOD PRESSURE: 111 MMHG | TEMPERATURE: 98.4 F

## 2024-01-23 VITALS
SYSTOLIC BLOOD PRESSURE: 111 MMHG | HEIGHT: 69 IN | DIASTOLIC BLOOD PRESSURE: 67 MMHG | WEIGHT: 153.5 LBS | TEMPERATURE: 98.4 F | BODY MASS INDEX: 22.74 KG/M2 | OXYGEN SATURATION: 94 % | RESPIRATION RATE: 18 BRPM | HEART RATE: 114 BPM

## 2024-01-23 DIAGNOSIS — C79.31 METASTASIS TO BRAIN: Primary | ICD-10-CM

## 2024-01-23 DIAGNOSIS — C34.90 SMALL CELL LUNG CANCER: Primary | ICD-10-CM

## 2024-01-23 DIAGNOSIS — Z45.2 ENCOUNTER FOR CENTRAL LINE CARE: ICD-10-CM

## 2024-01-23 DIAGNOSIS — C79.31 METASTASIS TO BRAIN: ICD-10-CM

## 2024-01-23 LAB
ALBUMIN SERPL-MCNC: 3.8 G/DL (ref 3.5–5.2)
ALBUMIN/GLOB SERPL: 1.2 G/DL
ALP SERPL-CCNC: 56 U/L (ref 39–117)
ALT SERPL W P-5'-P-CCNC: 9 U/L (ref 1–41)
ANION GAP SERPL CALCULATED.3IONS-SCNC: 11 MMOL/L (ref 5–15)
AST SERPL-CCNC: 14 U/L (ref 1–40)
BASOPHILS # BLD AUTO: 0.02 10*3/MM3 (ref 0–0.2)
BASOPHILS NFR BLD AUTO: 0.3 % (ref 0–1.5)
BILIRUB SERPL-MCNC: 0.2 MG/DL (ref 0–1.2)
BUN SERPL-MCNC: 7 MG/DL (ref 8–23)
BUN/CREAT SERPL: 7.3 (ref 7–25)
CALCIUM SPEC-SCNC: 9.5 MG/DL (ref 8.6–10.5)
CHLORIDE SERPL-SCNC: 105 MMOL/L (ref 98–107)
CO2 SERPL-SCNC: 26 MMOL/L (ref 22–29)
CREAT SERPL-MCNC: 0.96 MG/DL (ref 0.76–1.27)
DEPRECATED RDW RBC AUTO: 89.2 FL (ref 37–54)
EGFRCR SERPLBLD CKD-EPI 2021: 90.5 ML/MIN/1.73
EOSINOPHIL # BLD AUTO: 0.02 10*3/MM3 (ref 0–0.4)
EOSINOPHIL NFR BLD AUTO: 0.3 % (ref 0.3–6.2)
ERYTHROCYTE [DISTWIDTH] IN BLOOD BY AUTOMATED COUNT: 23.3 % (ref 12.3–15.4)
GLOBULIN UR ELPH-MCNC: 3.1 GM/DL
GLUCOSE BLDC GLUCOMTR-MCNC: 88 MG/DL (ref 70–105)
GLUCOSE SERPL-MCNC: 97 MG/DL (ref 65–99)
HCT VFR BLD AUTO: 23.9 % (ref 37.5–51)
HGB BLD-MCNC: 7.4 G/DL (ref 13–17.7)
LYMPHOCYTES # BLD AUTO: 2.34 10*3/MM3 (ref 0.7–3.1)
LYMPHOCYTES NFR BLD AUTO: 38.3 % (ref 19.6–45.3)
MCH RBC QN AUTO: 34.4 PG (ref 26.6–33)
MCHC RBC AUTO-ENTMCNC: 31 G/DL (ref 31.5–35.7)
MCV RBC AUTO: 111.2 FL (ref 79–97)
MONOCYTES # BLD AUTO: 0.91 10*3/MM3 (ref 0.1–0.9)
MONOCYTES NFR BLD AUTO: 14.9 % (ref 5–12)
NEUTROPHILS NFR BLD AUTO: 2.82 10*3/MM3 (ref 1.7–7)
NEUTROPHILS NFR BLD AUTO: 46.2 % (ref 42.7–76)
PLATELET # BLD AUTO: 161 10*3/MM3 (ref 140–450)
PMV BLD AUTO: 9.8 FL (ref 6–12)
POTASSIUM SERPL-SCNC: 3.4 MMOL/L (ref 3.5–5.2)
PROT SERPL-MCNC: 6.9 G/DL (ref 6–8.5)
RBC # BLD AUTO: 2.15 10*6/MM3 (ref 4.14–5.8)
SODIUM SERPL-SCNC: 142 MMOL/L (ref 136–145)
T3FREE SERPL-MCNC: 3.76 PG/ML (ref 2–4.4)
T4 FREE SERPL-MCNC: 1.24 NG/DL (ref 0.93–1.7)
TSH SERPL DL<=0.05 MIU/L-ACNC: 0.13 UIU/ML (ref 0.27–4.2)
WBC NRBC COR # BLD AUTO: 6.11 10*3/MM3 (ref 3.4–10.8)

## 2024-01-23 PROCEDURE — 25010000002 HEPARIN LOCK FLUSH PER 10 UNITS: Performed by: INTERNAL MEDICINE

## 2024-01-23 PROCEDURE — 84481 FREE ASSAY (FT-3): CPT | Performed by: INTERNAL MEDICINE

## 2024-01-23 PROCEDURE — 25010000002 ATEZOLIZUMAB 1200 MG/20ML SOLUTION 20 ML VIAL: Performed by: INTERNAL MEDICINE

## 2024-01-23 PROCEDURE — 25810000003 SODIUM CHLORIDE 0.9 % SOLUTION 250 ML FLEX CONT: Performed by: INTERNAL MEDICINE

## 2024-01-23 PROCEDURE — 96413 CHEMO IV INFUSION 1 HR: CPT

## 2024-01-23 PROCEDURE — 80050 GENERAL HEALTH PANEL: CPT | Performed by: INTERNAL MEDICINE

## 2024-01-23 PROCEDURE — 99215 OFFICE O/P EST HI 40 MIN: CPT | Performed by: INTERNAL MEDICINE

## 2024-01-23 PROCEDURE — 25810000003 SODIUM CHLORIDE 0.9 % SOLUTION: Performed by: INTERNAL MEDICINE

## 2024-01-23 PROCEDURE — 82948 REAGENT STRIP/BLOOD GLUCOSE: CPT

## 2024-01-23 PROCEDURE — 84439 ASSAY OF FREE THYROXINE: CPT | Performed by: INTERNAL MEDICINE

## 2024-01-23 RX ORDER — PREDNISONE 20 MG/1
40 TABLET ORAL DAILY
Qty: 10 TABLET | Refills: 0 | Status: SHIPPED | OUTPATIENT
Start: 2024-01-23 | End: 2024-01-28

## 2024-01-23 RX ORDER — SODIUM CHLORIDE 9 MG/ML
20 INJECTION, SOLUTION INTRAVENOUS ONCE
Status: COMPLETED | OUTPATIENT
Start: 2024-01-23 | End: 2024-01-23

## 2024-01-23 RX ORDER — HEPARIN SODIUM (PORCINE) LOCK FLUSH IV SOLN 100 UNIT/ML 100 UNIT/ML
500 SOLUTION INTRAVENOUS AS NEEDED
Status: DISCONTINUED | OUTPATIENT
Start: 2024-01-23 | End: 2024-01-24 | Stop reason: HOSPADM

## 2024-01-23 RX ORDER — ALBUTEROL SULFATE 90 UG/1
2 AEROSOL, METERED RESPIRATORY (INHALATION) EVERY 4 HOURS PRN
Qty: 18 G | Refills: 0 | Status: SHIPPED | OUTPATIENT
Start: 2024-01-23

## 2024-01-23 RX ORDER — SODIUM CHLORIDE 0.9 % (FLUSH) 0.9 %
10 SYRINGE (ML) INJECTION AS NEEDED
Status: DISCONTINUED | OUTPATIENT
Start: 2024-01-23 | End: 2024-01-24 | Stop reason: HOSPADM

## 2024-01-23 RX ORDER — HEPARIN SODIUM (PORCINE) LOCK FLUSH IV SOLN 100 UNIT/ML 100 UNIT/ML
500 SOLUTION INTRAVENOUS AS NEEDED
OUTPATIENT
Start: 2024-01-23

## 2024-01-23 RX ORDER — SODIUM CHLORIDE 0.9 % (FLUSH) 0.9 %
10 SYRINGE (ML) INJECTION AS NEEDED
OUTPATIENT
Start: 2024-01-23

## 2024-01-23 RX ADMIN — SODIUM CHLORIDE 20 ML/HR: 9 INJECTION, SOLUTION INTRAVENOUS at 10:43

## 2024-01-23 RX ADMIN — Medication 10 ML: at 11:24

## 2024-01-23 RX ADMIN — HEPARIN 500 UNITS: 100 SYRINGE at 11:24

## 2024-01-23 RX ADMIN — ATEZOLIZUMAB 1200 MG: 1200 INJECTION, SOLUTION INTRAVENOUS at 10:47

## 2024-01-24 ENCOUNTER — TELEPHONE (OUTPATIENT)
Dept: ONCOLOGY | Facility: CLINIC | Age: 59
End: 2024-01-24

## 2024-01-24 NOTE — TELEPHONE ENCOUNTER
Caller: MAHNAZANAHI PARR    Relationship: SISTER    Best call back number: [phone]    What is the best time to reach you: ANYTIME    Who are you requesting to speak with (clinical staff, provider,  specific staff member): CLINICAL    What was the call regarding: PT TESTING POSITIVE FOR COVID THIS MORNING AND IS NOT FEELING WELL REQUESTING A CALL BACK TO [facility]

## 2024-01-29 ENCOUNTER — HOSPITAL ENCOUNTER (OUTPATIENT)
Dept: RADIATION ONCOLOGY | Facility: HOSPITAL | Age: 59
Setting detail: RADIATION/ONCOLOGY SERIES
End: 2024-01-29
Payer: COMMERCIAL

## 2024-01-29 ENCOUNTER — HOSPITAL ENCOUNTER (OUTPATIENT)
Dept: MRI IMAGING | Facility: HOSPITAL | Age: 59
Discharge: HOME | End: 2024-01-29
Admitting: RADIOLOGY
Payer: COMMERCIAL

## 2024-01-29 DIAGNOSIS — C79.31 METASTASIS TO BRAIN: ICD-10-CM

## 2024-01-29 PROCEDURE — 70553 MRI BRAIN STEM W/O & W/DYE: CPT

## 2024-01-29 PROCEDURE — 25010000002 GADOTERIDOL PER 1 ML: Performed by: RADIOLOGY

## 2024-01-29 PROCEDURE — A9579 GAD-BASE MR CONTRAST NOS,1ML: HCPCS | Performed by: RADIOLOGY

## 2024-01-29 RX ADMIN — GADOTERIDOL 13 ML: 279.3 INJECTION, SOLUTION INTRAVENOUS at 09:41

## 2024-01-29 NOTE — PROGRESS NOTES
RADIATION ONCOLOGY RE-EVALUATION NOTE    NAME: Romero Maciel  YOB: 1965  MRN #: [id number]  DATE OF SERVICE: 1/30/2024  PRIMARY CARE PROVIDER: Jennifer Maynard APRN    DIAGNOSIS:  Stage IV SCLC  Encounter Diagnosis   Name Primary?    Small cell lung cancer Yes     REASON FOR VISIT:  Brain metastasis, SCLC    HISTORY OF PRESENT ILLNESS: The patient is a 59 y.o. year old male who was last seen in our office on 10/24/2023 for consultation. Planned to obtain CT simulation for SRS treatment to metastatic lesion in brain during this appointment but final pathology resulted as small cell lung cancer and patient was started urgently on chemotherapy.  Plan instead was for patient to complete 3-6 cycles of systemic therapy then RTC for consideration of brain directed XRT.    CHEMOTHERAPY TIMELINE  10/31/2023   Cycle 1 Atezolizumab/carboplatin/etoposide  11/20/2023   Cycle 2 Atezolizumab/carboplatin/etoposide  12/11/2023   Cycle 3 Atezolizumab/carboplatin/etoposide  01/02/2024   Cycle 4 Atezolizumab/carboplatin/etoposide  01/23/2024   Cycle 5 Atezolizumab only    1/29/2024: MRI brain with and without contrast at [facility] showed interval enlargement of previously noted right occipital lobe lesion somewhat atypical in appearance with findings most consistent with intralesional hemorrhage and again favoring metastasis, no new suspicious lesions are presented elsewhere.    He is not having headaches or new concerns.      The following portions of the patient's history were reviewed and updated as appropriate: allergies, current medications, past family history, past medical history, past social history, past surgical history and problem list. Reviewed with the patient and remain unchanged.    PAST MEDICAL HISTORY:  he has a past medical history of Cancer and Recurrent dislocation of knee (10/24/2023).    MEDICATIONS:    Current Outpatient Medications:     albuterol sulfate  (90 Base) MCG/ACT inhaler, Inhale 2  puffs Every 4 (Four) Hours As Needed for Wheezing., Disp: 18 g, Rfl: 0    budesonide (PULMICORT) 0.5 MG/2ML nebulizer solution, Take 2 mL by nebulization 2 (Two) Times a Day., Disp: 120 mL, Rfl: 0    clonazePAM (KlonoPIN) 1 MG tablet, Take 1 tablet by mouth 2 (Two) Times a Day As Needed., Disp: , Rfl:     doxepin (SINEquan) 75 MG capsule, Take 1 capsule by mouth Every Night., Disp: , Rfl:     guaiFENesin (MUCINEX) 600 MG 12 hr tablet, Take 2 tablets by mouth 2 (Two) Times a Day. (Patient not taking: Reported on 10/25/2023), Disp: , Rfl:     ipratropium-albuterol (DUO-NEB) 0.5-2.5 mg/3 ml nebulizer, Take 3 mL by nebulization Every 4 (Four) Hours As Needed for Wheezing., Disp: 180 mL, Rfl: 0    lidocaine-prilocaine (EMLA) 2.5-2.5 % cream, Apply 1 application  topically to the appropriate area as directed See Admin Instructions. Apply to port site one hour prior to port being accessed., Disp: 30 g, Rfl: 3    LORazepam (Ativan) 1 MG tablet, Take 1 tablet by mouth Daily. On MRI and Radiation days, Disp: 4 tablet, Rfl: 0    mirtazapine (REMERON) 45 MG tablet, Take 1 tablet by mouth Every Night., Disp: , Rfl:     Nirmatrelvir & Ritonavir, 300mg/100mg, (PAXLOVID) 20 x 150 MG & 10 x 100MG tablet therapy pack tablet, Take 3 tablets by mouth 2 (Two) Times a Day. Take as directed twice daily x 5 days, Disp: 1 each, Rfl: 0    OLANZapine (zyPREXA) 5 MG tablet, Take 1 tablet by mouth Every Night. Take nightly x 4 starting night of chemotherapy., Disp: 4 tablet, Rfl: 3    ondansetron (ZOFRAN) 8 MG tablet, Take 1 tablet by mouth 3 (Three) Times a Day As Needed for Nausea or Vomiting., Disp: 30 tablet, Rfl: 5    promethazine (PHENERGAN) 25 MG tablet, Take 1 tablet by mouth Every 8 (Eight) Hours As Needed for Nausea or Vomiting for up to 90 days. Alternate taking with Zofran., Disp: 90 tablet, Rfl: 0  No current facility-administered medications for this visit.    ALLERGIES:    Allergies   Allergen Reactions    Penicillins Anaphylaxis        PAST SURGICAL HISTORY:  he has a past surgical history that includes Bronchoscopy (N/A, 10/22/2023).    PREVIOUS RADIOTHERAPY OR CHEMOTHERAPY:  chemo/immunotherapy    FAMILY HISTORY:  hisfamily history includes Lung cancer in his brother and mother.    SOCIAL HISTORY:  he reports that he quit smoking about 3 months ago. His smoking use included cigarettes. He has a 15.00 pack-year smoking history. He has been exposed to tobacco smoke. He uses smokeless tobacco. He reports that he does not drink alcohol and does not use drugs.    PAIN AND PAIN MANAGEMENT:  none    NUTRITIONAL STATUS:   no issues    KPS:  90      REVIEW OF SYSTEMS: Chronic back pain.  General: No fevers, chills, weight change, or drenching night sweats. Skin: No rashes or jaundice.  HEENT: No change in vision or hearing, no headaches.  Neck: No dysphagia or masses.  Heme/Lymph: No easy bruising or bleeding.  Respiratory System: No shortness of breath or cough.  Cardiovascular: No chest pain, palpitations, or dyspnea on exertion.  - Pacemaker. GI: No nausea, vomiting, diarrhea, melena, or hematochezia.  : No dysuria or hematuria.  Endocrine: No heat or cold intolerance. Musculoskeletal: No myalgias or arthralgias.  Neuro: No weakness, numbness, syncope, or seizures. Psych: No mood changes or depression. Ext: Denies swelling.        Objective   VITAL SIGNS:  Vitals:    01/30/24 1324   BP: 117/76   Pulse: 97   Resp: 20   SpO2: 99%         PHYSICAL EXAM:  GENERAL: In no apparent distress, sitting comfortably in room.    HEENT: Normocephalic, atraumatic. Pupils are equal, round, reactive to light. Sclera anicteric. Conjunctiva not injected. Oropharynx without erythema, ulcerations or thrush.   NECK: Supple with no masses.  LYMPHATIC: No cervical, supraclavicular or axillary adenopathy appreciated bilaterally.   CARDIOVASCULAR: S1 & S2 detected; no murmurs, rubs or gallops.  CHEST: Clear to auscultation bilaterally; no wheezes, crackles or rubs.  Work of breathing normal.  ABDOMEN: Bowel sounds present. Abdomen is soft, nontender, nondistended.   MUSCULOSKELETAL: No tenderness to palpation along the spine or scapulae. Normal range of motion.  EXTREMITIES: No clubbing, cyanosis, edema.  SKIN: No erythema, rashes, ulcerations noted.   NEUROLOGIC: Cranial nerves II-XII grossly intact bilaterally. No focal neurologic deficits.  PSYCHIATRIC:  Alert, aware, and appropriate.      PERTINENT IMAGING/PATHOLOGY/LABS (Medical Decision Making):     COORDINATION OF CARE: A copy of this note is sent to the referring provider.    PATHOLOGY (Reviewed):     IMAGING (Reviewed): MRI brain 10/22/2023---17 mm peripherally enhancing met in the right occipital lobe; no other mets.  -Short interval MRI brain to make sure CNS control on Carbo/etoposide/atezo was done on:  Right occipital lobe appears decreased in size, at 7 mm; no new lesions.    New MRI brain 1/29/2024---Interval enlargement of the previously noted right occipital lobe lesion--atypical (I think this is now multifocal/multilocular, overall measuring 2.0 x 2.5 cm)           LABS (Reviewed):  HEMATOLOGY:  WBC   Date Value Ref Range Status   01/23/2024 6.11 3.40 - 10.80 10*3/mm3 Final     RBC   Date Value Ref Range Status   01/23/2024 2.15 (L) 4.14 - 5.80 10*6/mm3 Final     Hemoglobin   Date Value Ref Range Status   01/23/2024 7.4 (C) 13.0 - 17.7 g/dL Final     Hematocrit   Date Value Ref Range Status   01/23/2024 23.9 (C) 37.5 - 51.0 % Final     Platelets   Date Value Ref Range Status   01/23/2024 161 140 - 450 10*3/mm3 Final     CHEMISTRY:  Lab Results   Component Value Date    GLUCOSE 97 01/23/2024    BUN 7 (L) 01/23/2024    CREATININE 0.96 01/23/2024    BCR 7.3 01/23/2024    K 3.4 (L) 01/23/2024    CO2 26.0 01/23/2024    CALCIUM 9.5 01/23/2024    ALBUMIN 3.8 01/23/2024    AST 14 01/23/2024    ALT 9 01/23/2024       Assessment & Plan   ASSESSMENT AND PLAN:    1. Small cell lung cancer       SCLC with brain mets,  stage IV    -Did well on chemo/immunotherapy  Unfortunately, MRI brain shows progression:    MRI brain 10/22/2023---17 mm peripherally enhancing met in the right occipital lobe; no other mets.  -Short interval MRI brain to make sure CNS control on Carbo/etoposide/atezo was done on:  Right occipital lobe appears decreased in size, at 7 mm; no new lesions.    New MRI brain 1/29/2024---Interval enlargement of the previously noted right occipital lobe lesion--atypical (I think this is now multifocal/multilocular, overall measuring 2.0 x 2.5 cm)    Given the imaging findings I do not think SRS is a good option.  He wants to focus on neurocognitive sparing techniques and does appear to be a good candidate for Namenda + hippocampal sparing XRT  Dosing of 30 Gy in 10 fractions planned.    This assessment comes from my review of the imaging, pathology, physician notes and other pertinent information as mentioned.    DISPOSITION: CT sim ordered, needs ativan before.      TIME SPENT WITH PATIENT:   I spent 30 minutes caring for Romero on this date of service. This time includes time spent by me in the following activities: preparing for the visit, reviewing tests, obtaining and/or reviewing a separately obtained history, performing a medically appropriate examination and/or evaluation, documenting information in the medical record, independently interpreting results and communicating that information with the patient/family/caregiver, and care coordination      CC: Jeanette Rubio MD    Approved by:     Ty Goodrich MD

## 2024-01-30 ENCOUNTER — OFFICE VISIT (OUTPATIENT)
Dept: RADIATION ONCOLOGY | Facility: HOSPITAL | Age: 59
End: 2024-01-30
Payer: COMMERCIAL

## 2024-01-30 VITALS
RESPIRATION RATE: 20 BRPM | DIASTOLIC BLOOD PRESSURE: 76 MMHG | OXYGEN SATURATION: 99 % | BODY MASS INDEX: 22.15 KG/M2 | WEIGHT: 150 LBS | HEART RATE: 97 BPM | SYSTOLIC BLOOD PRESSURE: 117 MMHG

## 2024-01-30 DIAGNOSIS — C79.31 METASTASIS TO BRAIN: ICD-10-CM

## 2024-01-30 DIAGNOSIS — C34.90 SMALL CELL LUNG CANCER: Primary | ICD-10-CM

## 2024-01-30 PROCEDURE — G0463 HOSPITAL OUTPT CLINIC VISIT: HCPCS | Performed by: RADIOLOGY

## 2024-01-30 RX ORDER — LORAZEPAM 1 MG/1
1 TABLET ORAL DAILY
Qty: 11 TABLET | Refills: 0 | Status: SHIPPED | OUTPATIENT
Start: 2024-01-30

## 2024-01-31 ENCOUNTER — HOSPITAL ENCOUNTER (OUTPATIENT)
Dept: RADIATION ONCOLOGY | Facility: HOSPITAL | Age: 59
Discharge: HOME | End: 2024-01-31

## 2024-01-31 PROCEDURE — 77334 RADIATION TREATMENT AID(S): CPT | Performed by: RADIOLOGY

## 2024-01-31 PROCEDURE — 77290 THER RAD SIMULAJ FIELD CPLX: CPT | Performed by: RADIOLOGY

## 2024-01-31 RX ORDER — MEMANTINE HYDROCHLORIDE 5 MG-10 MG
KIT ORAL
Qty: 49 TABLET | Refills: 0 | Status: SHIPPED | OUTPATIENT
Start: 2024-01-31

## 2024-02-02 ENCOUNTER — HOSPITAL ENCOUNTER (OUTPATIENT)
Dept: RADIATION ONCOLOGY | Facility: HOSPITAL | Age: 59
Setting detail: RADIATION/ONCOLOGY SERIES
End: 2024-02-02
Payer: COMMERCIAL

## 2024-02-02 PROCEDURE — 77301 RADIOTHERAPY DOSE PLAN IMRT: CPT | Performed by: RADIOLOGY

## 2024-02-02 PROCEDURE — 77300 RADIATION THERAPY DOSE PLAN: CPT | Performed by: RADIOLOGY

## 2024-02-02 PROCEDURE — 77338 DESIGN MLC DEVICE FOR IMRT: CPT | Performed by: RADIOLOGY

## 2024-02-06 ENCOUNTER — HOSPITAL ENCOUNTER (OUTPATIENT)
Dept: RADIATION ONCOLOGY | Facility: HOSPITAL | Age: 59
Discharge: HOME | End: 2024-02-06

## 2024-02-06 ENCOUNTER — RADIATION ONCOLOGY WEEKLY ASSESSMENT (OUTPATIENT)
Dept: RADIATION ONCOLOGY | Facility: HOSPITAL | Age: 59
End: 2024-02-06
Payer: COMMERCIAL

## 2024-02-06 VITALS
HEIGHT: 69 IN | HEART RATE: 103 BPM | SYSTOLIC BLOOD PRESSURE: 117 MMHG | DIASTOLIC BLOOD PRESSURE: 76 MMHG | BODY MASS INDEX: 21.33 KG/M2 | OXYGEN SATURATION: 98 % | WEIGHT: 144 LBS | RESPIRATION RATE: 18 BRPM

## 2024-02-06 DIAGNOSIS — C79.31 METASTASIS TO BRAIN: Primary | ICD-10-CM

## 2024-02-06 DIAGNOSIS — C34.90 SMALL CELL LUNG CANCER: ICD-10-CM

## 2024-02-06 LAB
RAD ONC ARIA COURSE ID: NORMAL
RAD ONC ARIA COURSE LAST TREATMENT DATE: NORMAL
RAD ONC ARIA COURSE START DATE: NORMAL
RAD ONC ARIA COURSE TREATMENT ELAPSED DAYS: 0
RAD ONC ARIA FIRST TREATMENT DATE: NORMAL
RAD ONC ARIA PLAN FRACTIONS TREATED TO DATE: 1
RAD ONC ARIA PLAN ID: NORMAL
RAD ONC ARIA PLAN PRESCRIBED DOSE PER FRACTION: 3 GY
RAD ONC ARIA PLAN PRIMARY REFERENCE POINT: NORMAL
RAD ONC ARIA PLAN TOTAL FRACTIONS PRESCRIBED: 10
RAD ONC ARIA PLAN TOTAL PRESCRIBED DOSE: 3000 CGY
RAD ONC ARIA REFERENCE POINT DOSAGE GIVEN TO DATE: 3 GY
RAD ONC ARIA REFERENCE POINT ID: NORMAL
RAD ONC ARIA REFERENCE POINT SESSION DOSAGE GIVEN: 3 GY

## 2024-02-06 PROCEDURE — 77427 RADIATION TX MANAGEMENT X5: CPT | Performed by: RADIOLOGY

## 2024-02-06 PROCEDURE — 77014 CHG CT GUIDANCE RADIATION THERAPY FLDS PLACEMENT: CPT | Performed by: RADIOLOGY

## 2024-02-06 PROCEDURE — 77386: CPT | Performed by: RADIOLOGY

## 2024-02-06 NOTE — PROGRESS NOTES
"NAME: Romero Maciel DATE: 2024   : 1965    MRN: [id number] DIAGNOSIS: Brain mets--SCLC          RADIATION ON TREATMENT VISIT NOTE - BRAIN    Treatment Summary    [x] Concurrent Chemotherapy   Regimen:  Atezolizumab/ Carboplatin/ Etoposide     Treatment Site Ref. ID Energy Dose/Fx (cGy) #Fx Dose Correction (cGy) Total Dose (cGy) Start Date End Date Elapsed Days   GARCIA WhBrain HA WhBrain 6X 300 1 / 10 0 300 2024 0         General     Review of Systems  [x] No new complaints [] Headache   [] Seizure activity  [] Memory loss  [] Gait disturbance  [] Nausea  [] Vomiting   [] Speech changes  [] Visual changes  [] Weakness   [] Skin itching   [] Hair loss  [] Skin soreness with pain  [] Fatigue, severity: 0 [x] Pain, severity: 6, location: lower back  Romero Maciel reports a pain score of 6.  Given his pain assessment as noted, treatment options were discussed and the following options were decided upon as a follow-up plan to address the patient's pain: continuation of current treatment plan for pain.    [] Steroid regimen:   [] Anti-seizure regimen:   [] Pain regimen:  [x] Skin regimen:  Discussed aquaphor    Subjective:  He started treatments today, he doesn't have any concerns or complaints today.    KPS: 90 (recent COVID)     Vital Signs: /76   Pulse 103   Resp 18   Ht 175.3 cm (69\")   Wt 65.3 kg (144 lb)   SpO2 98%   BMI 21.27 kg/m²     Weight:   Wt Readings from Last 3 Encounters:   24 65.3 kg (144 lb)   24 68 kg (150 lb)   24 69.6 kg (153 lb 8 oz)     Medication:   Current Outpatient Medications:     albuterol sulfate  (90 Base) MCG/ACT inhaler, Inhale 2 puffs Every 4 (Four) Hours As Needed for Wheezing., Disp: 18 g, Rfl: 0    budesonide (PULMICORT) 0.5 MG/2ML nebulizer solution, Take 2 mL by nebulization 2 (Two) Times a Day., Disp: 120 mL, Rfl: 0    clonazePAM (KlonoPIN) 1 MG tablet, Take 1 tablet by mouth 2 (Two) Times a Day As Needed., Disp: , Rfl:     " doxepin (SINEquan) 75 MG capsule, Take 1 capsule by mouth Every Night., Disp: , Rfl:     guaiFENesin (MUCINEX) 600 MG 12 hr tablet, Take 2 tablets by mouth 2 (Two) Times a Day. (Patient not taking: Reported on 10/25/2023), Disp: , Rfl:     ipratropium-albuterol (DUO-NEB) 0.5-2.5 mg/3 ml nebulizer, Take 3 mL by nebulization Every 4 (Four) Hours As Needed for Wheezing., Disp: 180 mL, Rfl: 0    lidocaine-prilocaine (EMLA) 2.5-2.5 % cream, Apply 1 application  topically to the appropriate area as directed See Admin Instructions. Apply to port site one hour prior to port being accessed., Disp: 30 g, Rfl: 3    LORazepam (Ativan) 1 MG tablet, Take 1 tablet by mouth Daily. On MRI and Radiation days, Disp: 11 tablet, Rfl: 0    memantine (Namenda Titration Jose J) 28 x 5 MG & 21 x 10 MG tablet pack, Follow package directions., Disp: 49 tablet, Rfl: 0    mirtazapine (REMERON) 45 MG tablet, Take 1 tablet by mouth Every Night., Disp: , Rfl:     Nirmatrelvir & Ritonavir, 300mg/100mg, (PAXLOVID) 20 x 150 MG & 10 x 100MG tablet therapy pack tablet, Take 3 tablets by mouth 2 (Two) Times a Day. Take as directed twice daily x 5 days, Disp: 1 each, Rfl: 0    OLANZapine (zyPREXA) 5 MG tablet, Take 1 tablet by mouth Every Night. Take nightly x 4 starting night of chemotherapy., Disp: 4 tablet, Rfl: 3    ondansetron (ZOFRAN) 8 MG tablet, Take 1 tablet by mouth 3 (Three) Times a Day As Needed for Nausea or Vomiting., Disp: 30 tablet, Rfl: 5    promethazine (PHENERGAN) 25 MG tablet, Take 1 tablet by mouth Every 8 (Eight) Hours As Needed for Nausea or Vomiting for up to 90 days. Alternate taking with Zofran., Disp: 90 tablet, Rfl: 0      LABS (Reviewed):  Hematology  WBC   Date Value Ref Range Status   01/23/2024 6.11 3.40 - 10.80 10*3/mm3 Final     RBC   Date Value Ref Range Status   01/23/2024 2.15 (L) 4.14 - 5.80 10*6/mm3 Final     Hemoglobin   Date Value Ref Range Status   01/23/2024 7.4 (C) 13.0 - 17.7 g/dL Final     Hematocrit   Date  Value Ref Range Status   01/23/2024 23.9 (C) 37.5 - 51.0 % Final     Platelets   Date Value Ref Range Status   01/23/2024 161 140 - 450 10*3/mm3 Final     Chemistry   Lab Results   Component Value Date    GLUCOSE 97 01/23/2024    BUN 7 (L) 01/23/2024    CREATININE 0.96 01/23/2024    BCR 7.3 01/23/2024    K 3.4 (L) 01/23/2024    CO2 26.0 01/23/2024    CALCIUM 9.5 01/23/2024    ALBUMIN 3.8 01/23/2024    AST 14 01/23/2024    ALT 9 01/23/2024     Physical Exam     Neurology: [x] CNII-XII grossly intact    [x] Strength: intact    [x] Reflexes: intact    [] Encephalopathy:     [] Memory impairment:     [] Neuropathy: motor/sensory:   Auditory/Ear: [] Otitis, external ear (non-infectious):   Ocular/Visual: [] PERRLA/EOMI:   Skin:  stGstrstastdstest:st st1st Comments/Notes:      [x] Review of labs, images, dosimetry, dose delivered, & treatment parameters.    Comments:     [x] Patient treatment setup reviewed.    Comments:     Recommendations:  continue XRT and supportive measures    [x] Continue RT  [] Change RT Plan [] Hold RT, length:        Approved Electronically By:  Ty Goodrich MD, 2/6/2024, 16:24 EST      Confidentiality of this medical record shall be maintained except when use or disclosure is required or permitted by law, regulation or written authorization by the patient.

## 2024-02-07 ENCOUNTER — HOSPITAL ENCOUNTER (OUTPATIENT)
Dept: RADIATION ONCOLOGY | Facility: HOSPITAL | Age: 59
Discharge: HOME | End: 2024-02-07

## 2024-02-07 LAB
RAD ONC ARIA COURSE ID: NORMAL
RAD ONC ARIA COURSE LAST TREATMENT DATE: NORMAL
RAD ONC ARIA COURSE START DATE: NORMAL
RAD ONC ARIA COURSE TREATMENT ELAPSED DAYS: 1
RAD ONC ARIA FIRST TREATMENT DATE: NORMAL
RAD ONC ARIA PLAN FRACTIONS TREATED TO DATE: 2
RAD ONC ARIA PLAN ID: NORMAL
RAD ONC ARIA PLAN PRESCRIBED DOSE PER FRACTION: 3 GY
RAD ONC ARIA PLAN PRIMARY REFERENCE POINT: NORMAL
RAD ONC ARIA PLAN TOTAL FRACTIONS PRESCRIBED: 10
RAD ONC ARIA PLAN TOTAL PRESCRIBED DOSE: 3000 CGY
RAD ONC ARIA REFERENCE POINT DOSAGE GIVEN TO DATE: 6 GY
RAD ONC ARIA REFERENCE POINT ID: NORMAL
RAD ONC ARIA REFERENCE POINT SESSION DOSAGE GIVEN: 3 GY

## 2024-02-07 PROCEDURE — 77386: CPT | Performed by: RADIOLOGY

## 2024-02-07 PROCEDURE — 77014 CHG CT GUIDANCE RADIATION THERAPY FLDS PLACEMENT: CPT | Performed by: RADIOLOGY

## 2024-02-08 ENCOUNTER — HOSPITAL ENCOUNTER (OUTPATIENT)
Dept: RADIATION ONCOLOGY | Facility: HOSPITAL | Age: 59
Discharge: HOME | End: 2024-02-08

## 2024-02-08 LAB
RAD ONC ARIA COURSE ID: NORMAL
RAD ONC ARIA COURSE LAST TREATMENT DATE: NORMAL
RAD ONC ARIA COURSE START DATE: NORMAL
RAD ONC ARIA COURSE TREATMENT ELAPSED DAYS: 2
RAD ONC ARIA FIRST TREATMENT DATE: NORMAL
RAD ONC ARIA PLAN FRACTIONS TREATED TO DATE: 3
RAD ONC ARIA PLAN ID: NORMAL
RAD ONC ARIA PLAN PRESCRIBED DOSE PER FRACTION: 3 GY
RAD ONC ARIA PLAN PRIMARY REFERENCE POINT: NORMAL
RAD ONC ARIA PLAN TOTAL FRACTIONS PRESCRIBED: 10
RAD ONC ARIA PLAN TOTAL PRESCRIBED DOSE: 3000 CGY
RAD ONC ARIA REFERENCE POINT DOSAGE GIVEN TO DATE: 9 GY
RAD ONC ARIA REFERENCE POINT ID: NORMAL
RAD ONC ARIA REFERENCE POINT SESSION DOSAGE GIVEN: 3 GY

## 2024-02-08 PROCEDURE — 77336 RADIATION PHYSICS CONSULT: CPT | Performed by: RADIOLOGY

## 2024-02-08 PROCEDURE — 77014 CHG CT GUIDANCE RADIATION THERAPY FLDS PLACEMENT: CPT | Performed by: RADIOLOGY

## 2024-02-08 PROCEDURE — 77386: CPT | Performed by: RADIOLOGY

## 2024-02-09 ENCOUNTER — HOSPITAL ENCOUNTER (OUTPATIENT)
Dept: RADIATION ONCOLOGY | Facility: HOSPITAL | Age: 59
Discharge: HOME | End: 2024-02-09

## 2024-02-09 LAB
RAD ONC ARIA COURSE ID: NORMAL
RAD ONC ARIA COURSE LAST TREATMENT DATE: NORMAL
RAD ONC ARIA COURSE START DATE: NORMAL
RAD ONC ARIA COURSE TREATMENT ELAPSED DAYS: 3
RAD ONC ARIA FIRST TREATMENT DATE: NORMAL
RAD ONC ARIA PLAN FRACTIONS TREATED TO DATE: 4
RAD ONC ARIA PLAN ID: NORMAL
RAD ONC ARIA PLAN PRESCRIBED DOSE PER FRACTION: 3 GY
RAD ONC ARIA PLAN PRIMARY REFERENCE POINT: NORMAL
RAD ONC ARIA PLAN TOTAL FRACTIONS PRESCRIBED: 10
RAD ONC ARIA PLAN TOTAL PRESCRIBED DOSE: 3000 CGY
RAD ONC ARIA REFERENCE POINT DOSAGE GIVEN TO DATE: 12 GY
RAD ONC ARIA REFERENCE POINT ID: NORMAL
RAD ONC ARIA REFERENCE POINT SESSION DOSAGE GIVEN: 3 GY

## 2024-02-09 PROCEDURE — 77386: CPT | Performed by: RADIOLOGY

## 2024-02-09 PROCEDURE — 77014 CHG CT GUIDANCE RADIATION THERAPY FLDS PLACEMENT: CPT | Performed by: RADIOLOGY

## 2024-02-12 ENCOUNTER — HOSPITAL ENCOUNTER (OUTPATIENT)
Dept: RADIATION ONCOLOGY | Facility: HOSPITAL | Age: 59
Discharge: HOME | End: 2024-02-12
Payer: COMMERCIAL

## 2024-02-12 LAB
RAD ONC ARIA COURSE ID: NORMAL
RAD ONC ARIA COURSE LAST TREATMENT DATE: NORMAL
RAD ONC ARIA COURSE START DATE: NORMAL
RAD ONC ARIA COURSE TREATMENT ELAPSED DAYS: 6
RAD ONC ARIA FIRST TREATMENT DATE: NORMAL
RAD ONC ARIA PLAN FRACTIONS TREATED TO DATE: 5
RAD ONC ARIA PLAN ID: NORMAL
RAD ONC ARIA PLAN PRESCRIBED DOSE PER FRACTION: 3 GY
RAD ONC ARIA PLAN PRIMARY REFERENCE POINT: NORMAL
RAD ONC ARIA PLAN TOTAL FRACTIONS PRESCRIBED: 10
RAD ONC ARIA PLAN TOTAL PRESCRIBED DOSE: 3000 CGY
RAD ONC ARIA REFERENCE POINT DOSAGE GIVEN TO DATE: 15 GY
RAD ONC ARIA REFERENCE POINT ID: NORMAL
RAD ONC ARIA REFERENCE POINT SESSION DOSAGE GIVEN: 3 GY

## 2024-02-12 PROCEDURE — 77386: CPT | Performed by: RADIOLOGY

## 2024-02-12 PROCEDURE — 77014 CHG CT GUIDANCE RADIATION THERAPY FLDS PLACEMENT: CPT | Performed by: RADIOLOGY

## 2024-02-13 ENCOUNTER — OFFICE VISIT (OUTPATIENT)
Dept: ONCOLOGY | Facility: CLINIC | Age: 59
End: 2024-02-13
Payer: COMMERCIAL

## 2024-02-13 ENCOUNTER — HOSPITAL ENCOUNTER (OUTPATIENT)
Dept: ONCOLOGY | Facility: HOSPITAL | Age: 59
Discharge: HOME | End: 2024-02-13
Admitting: INTERNAL MEDICINE
Payer: COMMERCIAL

## 2024-02-13 ENCOUNTER — RADIATION ONCOLOGY WEEKLY ASSESSMENT (OUTPATIENT)
Dept: RADIATION ONCOLOGY | Facility: HOSPITAL | Age: 59
End: 2024-02-13
Payer: COMMERCIAL

## 2024-02-13 ENCOUNTER — HOSPITAL ENCOUNTER (OUTPATIENT)
Dept: RADIATION ONCOLOGY | Facility: HOSPITAL | Age: 59
Discharge: HOME | End: 2024-02-13

## 2024-02-13 ENCOUNTER — TELEPHONE (OUTPATIENT)
Dept: ONCOLOGY | Facility: CLINIC | Age: 59
End: 2024-02-13
Payer: COMMERCIAL

## 2024-02-13 VITALS
OXYGEN SATURATION: 99 % | TEMPERATURE: 98.2 F | HEART RATE: 79 BPM | BODY MASS INDEX: 22.05 KG/M2 | SYSTOLIC BLOOD PRESSURE: 121 MMHG | HEIGHT: 69 IN | DIASTOLIC BLOOD PRESSURE: 80 MMHG | WEIGHT: 148.9 LBS | RESPIRATION RATE: 18 BRPM

## 2024-02-13 VITALS
HEIGHT: 69 IN | BODY MASS INDEX: 21.7 KG/M2 | RESPIRATION RATE: 18 BRPM | HEART RATE: 93 BPM | DIASTOLIC BLOOD PRESSURE: 79 MMHG | WEIGHT: 146.5 LBS | OXYGEN SATURATION: 97 % | TEMPERATURE: 97.3 F | SYSTOLIC BLOOD PRESSURE: 120 MMHG

## 2024-02-13 DIAGNOSIS — C34.90 SMALL CELL LUNG CANCER: Primary | ICD-10-CM

## 2024-02-13 DIAGNOSIS — C34.11 MALIGNANT NEOPLASM OF UPPER LOBE, RIGHT BRONCHUS OR LUNG: ICD-10-CM

## 2024-02-13 DIAGNOSIS — C79.31 METASTASIS TO BRAIN: Primary | ICD-10-CM

## 2024-02-13 DIAGNOSIS — C34.90 SMALL CELL LUNG CANCER: ICD-10-CM

## 2024-02-13 DIAGNOSIS — Z45.2 ENCOUNTER FOR CENTRAL LINE CARE: ICD-10-CM

## 2024-02-13 DIAGNOSIS — C79.31 METASTASIS TO BRAIN: ICD-10-CM

## 2024-02-13 LAB
ALBUMIN SERPL-MCNC: 3.6 G/DL (ref 3.5–5.2)
ALBUMIN/GLOB SERPL: 1 G/DL
ALP SERPL-CCNC: 50 U/L (ref 39–117)
ALT SERPL W P-5'-P-CCNC: 18 U/L (ref 1–41)
ANION GAP SERPL CALCULATED.3IONS-SCNC: 11 MMOL/L (ref 5–15)
AST SERPL-CCNC: 16 U/L (ref 1–40)
BASOPHILS # BLD AUTO: 0.02 10*3/MM3 (ref 0–0.2)
BASOPHILS NFR BLD AUTO: 0.3 % (ref 0–1.5)
BILIRUB SERPL-MCNC: 0.2 MG/DL (ref 0–1.2)
BUN SERPL-MCNC: 10 MG/DL (ref 8–23)
BUN/CREAT SERPL: 10.8 (ref 7–25)
CALCIUM SPEC-SCNC: 9.5 MG/DL (ref 8.6–10.5)
CHLORIDE SERPL-SCNC: 102 MMOL/L (ref 98–107)
CO2 SERPL-SCNC: 26 MMOL/L (ref 22–29)
CREAT SERPL-MCNC: 0.93 MG/DL (ref 0.76–1.27)
DEPRECATED RDW RBC AUTO: 61.8 FL (ref 37–54)
EGFRCR SERPLBLD CKD-EPI 2021: 94 ML/MIN/1.73
EOSINOPHIL # BLD AUTO: 0.15 10*3/MM3 (ref 0–0.4)
EOSINOPHIL NFR BLD AUTO: 1.9 % (ref 0.3–6.2)
ERYTHROCYTE [DISTWIDTH] IN BLOOD BY AUTOMATED COUNT: 16.4 % (ref 12.3–15.4)
GLOBULIN UR ELPH-MCNC: 3.5 GM/DL
GLUCOSE BLDC GLUCOMTR-MCNC: 146 MG/DL (ref 70–105)
GLUCOSE SERPL-MCNC: 138 MG/DL (ref 65–99)
HCT VFR BLD AUTO: 29.1 % (ref 37.5–51)
HGB BLD-MCNC: 9.2 G/DL (ref 13–17.7)
LYMPHOCYTES # BLD AUTO: 1.57 10*3/MM3 (ref 0.7–3.1)
LYMPHOCYTES NFR BLD AUTO: 20.2 % (ref 19.6–45.3)
MCH RBC QN AUTO: 33.9 PG (ref 26.6–33)
MCHC RBC AUTO-ENTMCNC: 31.6 G/DL (ref 31.5–35.7)
MCV RBC AUTO: 107.4 FL (ref 79–97)
MONOCYTES # BLD AUTO: 0.66 10*3/MM3 (ref 0.1–0.9)
MONOCYTES NFR BLD AUTO: 8.5 % (ref 5–12)
NEUTROPHILS NFR BLD AUTO: 5.38 10*3/MM3 (ref 1.7–7)
NEUTROPHILS NFR BLD AUTO: 69.1 % (ref 42.7–76)
PLATELET # BLD AUTO: 246 10*3/MM3 (ref 140–450)
PMV BLD AUTO: 8.7 FL (ref 6–12)
POTASSIUM SERPL-SCNC: 3.2 MMOL/L (ref 3.5–5.2)
PROT SERPL-MCNC: 7.1 G/DL (ref 6–8.5)
RAD ONC ARIA COURSE ID: NORMAL
RAD ONC ARIA COURSE LAST TREATMENT DATE: NORMAL
RAD ONC ARIA COURSE START DATE: NORMAL
RAD ONC ARIA COURSE TREATMENT ELAPSED DAYS: 7
RAD ONC ARIA FIRST TREATMENT DATE: NORMAL
RAD ONC ARIA PLAN FRACTIONS TREATED TO DATE: 6
RAD ONC ARIA PLAN ID: NORMAL
RAD ONC ARIA PLAN PRESCRIBED DOSE PER FRACTION: 3 GY
RAD ONC ARIA PLAN PRIMARY REFERENCE POINT: NORMAL
RAD ONC ARIA PLAN TOTAL FRACTIONS PRESCRIBED: 10
RAD ONC ARIA PLAN TOTAL PRESCRIBED DOSE: 3000 CGY
RAD ONC ARIA REFERENCE POINT DOSAGE GIVEN TO DATE: 18 GY
RAD ONC ARIA REFERENCE POINT ID: NORMAL
RAD ONC ARIA REFERENCE POINT SESSION DOSAGE GIVEN: 3 GY
RBC # BLD AUTO: 2.71 10*6/MM3 (ref 4.14–5.8)
SODIUM SERPL-SCNC: 139 MMOL/L (ref 136–145)
WBC NRBC COR # BLD AUTO: 7.78 10*3/MM3 (ref 3.4–10.8)

## 2024-02-13 PROCEDURE — 77386: CPT | Performed by: RADIOLOGY

## 2024-02-13 PROCEDURE — 99214 OFFICE O/P EST MOD 30 MIN: CPT | Performed by: INTERNAL MEDICINE

## 2024-02-13 PROCEDURE — 77014 CHG CT GUIDANCE RADIATION THERAPY FLDS PLACEMENT: CPT | Performed by: RADIOLOGY

## 2024-02-13 PROCEDURE — 80053 COMPREHEN METABOLIC PANEL: CPT | Performed by: INTERNAL MEDICINE

## 2024-02-13 PROCEDURE — 36591 DRAW BLOOD OFF VENOUS DEVICE: CPT

## 2024-02-13 PROCEDURE — FACE2FACE: Performed by: RADIOLOGY

## 2024-02-13 PROCEDURE — 82948 REAGENT STRIP/BLOOD GLUCOSE: CPT | Performed by: INTERNAL MEDICINE

## 2024-02-13 PROCEDURE — 85025 COMPLETE CBC W/AUTO DIFF WBC: CPT | Performed by: INTERNAL MEDICINE

## 2024-02-13 PROCEDURE — 25010000002 HEPARIN LOCK FLUSH PER 10 UNITS: Performed by: INTERNAL MEDICINE

## 2024-02-13 RX ORDER — SODIUM CHLORIDE 0.9 % (FLUSH) 0.9 %
10 SYRINGE (ML) INJECTION AS NEEDED
Status: DISCONTINUED | OUTPATIENT
Start: 2024-02-13 | End: 2024-02-14 | Stop reason: HOSPADM

## 2024-02-13 RX ORDER — HEPARIN SODIUM (PORCINE) LOCK FLUSH IV SOLN 100 UNIT/ML 100 UNIT/ML
500 SOLUTION INTRAVENOUS AS NEEDED
Status: DISCONTINUED | OUTPATIENT
Start: 2024-02-13 | End: 2024-02-14 | Stop reason: HOSPADM

## 2024-02-13 RX ORDER — HEPARIN SODIUM (PORCINE) LOCK FLUSH IV SOLN 100 UNIT/ML 100 UNIT/ML
500 SOLUTION INTRAVENOUS AS NEEDED
OUTPATIENT
Start: 2024-02-13

## 2024-02-13 RX ORDER — POTASSIUM CHLORIDE 20 MEQ/1
40 TABLET, EXTENDED RELEASE ORAL ONCE
Qty: 2 TABLET | Refills: 0 | Status: SHIPPED | OUTPATIENT
Start: 2024-02-13 | End: 2024-02-13

## 2024-02-13 RX ORDER — SODIUM CHLORIDE 0.9 % (FLUSH) 0.9 %
10 SYRINGE (ML) INJECTION AS NEEDED
OUTPATIENT
Start: 2024-02-13

## 2024-02-13 RX ORDER — MEMANTINE HYDROCHLORIDE 10 MG/1
10 TABLET ORAL 2 TIMES DAILY
Qty: 60 TABLET | Refills: 4 | Status: SHIPPED | OUTPATIENT
Start: 2024-02-13

## 2024-02-13 RX ADMIN — HEPARIN 500 UNITS: 100 SYRINGE at 10:35

## 2024-02-13 RX ADMIN — Medication 10 ML: at 10:35

## 2024-02-14 ENCOUNTER — HOSPITAL ENCOUNTER (OUTPATIENT)
Dept: RADIATION ONCOLOGY | Facility: HOSPITAL | Age: 59
Discharge: HOME | End: 2024-02-14

## 2024-02-14 LAB
RAD ONC ARIA COURSE ID: NORMAL
RAD ONC ARIA COURSE LAST TREATMENT DATE: NORMAL
RAD ONC ARIA COURSE START DATE: NORMAL
RAD ONC ARIA COURSE TREATMENT ELAPSED DAYS: 8
RAD ONC ARIA FIRST TREATMENT DATE: NORMAL
RAD ONC ARIA PLAN FRACTIONS TREATED TO DATE: 7
RAD ONC ARIA PLAN ID: NORMAL
RAD ONC ARIA PLAN PRESCRIBED DOSE PER FRACTION: 3 GY
RAD ONC ARIA PLAN PRIMARY REFERENCE POINT: NORMAL
RAD ONC ARIA PLAN TOTAL FRACTIONS PRESCRIBED: 10
RAD ONC ARIA PLAN TOTAL PRESCRIBED DOSE: 3000 CGY
RAD ONC ARIA REFERENCE POINT DOSAGE GIVEN TO DATE: 21 GY
RAD ONC ARIA REFERENCE POINT ID: NORMAL
RAD ONC ARIA REFERENCE POINT SESSION DOSAGE GIVEN: 3 GY

## 2024-02-14 PROCEDURE — 77014 CHG CT GUIDANCE RADIATION THERAPY FLDS PLACEMENT: CPT | Performed by: RADIOLOGY

## 2024-02-14 PROCEDURE — 77386: CPT | Performed by: RADIOLOGY

## 2024-02-15 ENCOUNTER — HOSPITAL ENCOUNTER (OUTPATIENT)
Dept: RADIATION ONCOLOGY | Facility: HOSPITAL | Age: 59
Discharge: HOME | End: 2024-02-15

## 2024-02-15 LAB
RAD ONC ARIA COURSE ID: NORMAL
RAD ONC ARIA COURSE LAST TREATMENT DATE: NORMAL
RAD ONC ARIA COURSE START DATE: NORMAL
RAD ONC ARIA COURSE TREATMENT ELAPSED DAYS: 9
RAD ONC ARIA FIRST TREATMENT DATE: NORMAL
RAD ONC ARIA PLAN FRACTIONS TREATED TO DATE: 8
RAD ONC ARIA PLAN ID: NORMAL
RAD ONC ARIA PLAN PRESCRIBED DOSE PER FRACTION: 3 GY
RAD ONC ARIA PLAN PRIMARY REFERENCE POINT: NORMAL
RAD ONC ARIA PLAN TOTAL FRACTIONS PRESCRIBED: 10
RAD ONC ARIA PLAN TOTAL PRESCRIBED DOSE: 3000 CGY
RAD ONC ARIA REFERENCE POINT DOSAGE GIVEN TO DATE: 24 GY
RAD ONC ARIA REFERENCE POINT ID: NORMAL
RAD ONC ARIA REFERENCE POINT SESSION DOSAGE GIVEN: 3 GY

## 2024-02-15 PROCEDURE — 77336 RADIATION PHYSICS CONSULT: CPT | Performed by: RADIOLOGY

## 2024-02-15 PROCEDURE — 77386: CPT | Performed by: RADIOLOGY

## 2024-02-15 PROCEDURE — 77014 CHG CT GUIDANCE RADIATION THERAPY FLDS PLACEMENT: CPT | Performed by: RADIOLOGY

## 2024-02-16 ENCOUNTER — HOSPITAL ENCOUNTER (OUTPATIENT)
Dept: RADIATION ONCOLOGY | Facility: HOSPITAL | Age: 59
Discharge: HOME | End: 2024-02-16

## 2024-02-16 LAB
RAD ONC ARIA COURSE ID: NORMAL
RAD ONC ARIA COURSE LAST TREATMENT DATE: NORMAL
RAD ONC ARIA COURSE START DATE: NORMAL
RAD ONC ARIA COURSE TREATMENT ELAPSED DAYS: 10
RAD ONC ARIA FIRST TREATMENT DATE: NORMAL
RAD ONC ARIA PLAN FRACTIONS TREATED TO DATE: 9
RAD ONC ARIA PLAN ID: NORMAL
RAD ONC ARIA PLAN PRESCRIBED DOSE PER FRACTION: 3 GY
RAD ONC ARIA PLAN PRIMARY REFERENCE POINT: NORMAL
RAD ONC ARIA PLAN TOTAL FRACTIONS PRESCRIBED: 10
RAD ONC ARIA PLAN TOTAL PRESCRIBED DOSE: 3000 CGY
RAD ONC ARIA REFERENCE POINT DOSAGE GIVEN TO DATE: 27 GY
RAD ONC ARIA REFERENCE POINT ID: NORMAL
RAD ONC ARIA REFERENCE POINT SESSION DOSAGE GIVEN: 3 GY

## 2024-02-16 PROCEDURE — 77014 CHG CT GUIDANCE RADIATION THERAPY FLDS PLACEMENT: CPT | Performed by: RADIOLOGY

## 2024-02-16 PROCEDURE — 77386: CPT | Performed by: RADIOLOGY

## 2024-02-19 ENCOUNTER — APPOINTMENT (OUTPATIENT)
Dept: RADIATION ONCOLOGY | Facility: HOSPITAL | Age: 59
End: 2024-02-19
Payer: COMMERCIAL

## 2024-02-19 ENCOUNTER — HOSPITAL ENCOUNTER (OUTPATIENT)
Dept: RADIATION ONCOLOGY | Facility: HOSPITAL | Age: 59
Discharge: HOME | End: 2024-02-19
Payer: COMMERCIAL

## 2024-02-19 LAB
RAD ONC ARIA COURSE ID: NORMAL
RAD ONC ARIA COURSE LAST TREATMENT DATE: NORMAL
RAD ONC ARIA COURSE START DATE: NORMAL
RAD ONC ARIA COURSE TREATMENT ELAPSED DAYS: 13
RAD ONC ARIA FIRST TREATMENT DATE: NORMAL
RAD ONC ARIA PLAN FRACTIONS TREATED TO DATE: 10
RAD ONC ARIA PLAN ID: NORMAL
RAD ONC ARIA PLAN PRESCRIBED DOSE PER FRACTION: 3 GY
RAD ONC ARIA PLAN PRIMARY REFERENCE POINT: NORMAL
RAD ONC ARIA PLAN TOTAL FRACTIONS PRESCRIBED: 10
RAD ONC ARIA PLAN TOTAL PRESCRIBED DOSE: 3000 CGY
RAD ONC ARIA REFERENCE POINT DOSAGE GIVEN TO DATE: 30 GY
RAD ONC ARIA REFERENCE POINT ID: NORMAL
RAD ONC ARIA REFERENCE POINT SESSION DOSAGE GIVEN: 3 GY

## 2024-02-19 PROCEDURE — 77386: CPT | Performed by: INTERNAL MEDICINE

## 2024-02-19 PROCEDURE — 77014 CHG CT GUIDANCE RADIATION THERAPY FLDS PLACEMENT: CPT | Performed by: INTERNAL MEDICINE

## 2024-02-22 ENCOUNTER — HOSPITAL ENCOUNTER (OUTPATIENT)
Dept: ONCOLOGY | Facility: HOSPITAL | Age: 59
Discharge: HOME | End: 2024-02-22
Payer: COMMERCIAL

## 2024-02-22 VITALS
SYSTOLIC BLOOD PRESSURE: 111 MMHG | RESPIRATION RATE: 14 BRPM | HEART RATE: 99 BPM | DIASTOLIC BLOOD PRESSURE: 72 MMHG | TEMPERATURE: 98 F | WEIGHT: 144.2 LBS | BODY MASS INDEX: 21.36 KG/M2 | OXYGEN SATURATION: 94 % | HEIGHT: 69 IN

## 2024-02-22 DIAGNOSIS — M54.6 THORACIC BACK PAIN, UNSPECIFIED BACK PAIN LATERALITY, UNSPECIFIED CHRONICITY: ICD-10-CM

## 2024-02-22 DIAGNOSIS — C79.31 METASTASIS TO BRAIN: ICD-10-CM

## 2024-02-22 DIAGNOSIS — Z45.2 ENCOUNTER FOR CENTRAL LINE CARE: ICD-10-CM

## 2024-02-22 DIAGNOSIS — C34.90 SMALL CELL LUNG CANCER: Primary | ICD-10-CM

## 2024-02-22 LAB
ALBUMIN SERPL-MCNC: 3.5 G/DL (ref 3.5–5.2)
ALBUMIN/GLOB SERPL: 1 G/DL
ALP SERPL-CCNC: 58 U/L (ref 39–117)
ALT SERPL W P-5'-P-CCNC: 10 U/L (ref 1–41)
ANION GAP SERPL CALCULATED.3IONS-SCNC: 13 MMOL/L (ref 5–15)
AST SERPL-CCNC: 15 U/L (ref 1–40)
BASOPHILS # BLD AUTO: 0.02 10*3/MM3 (ref 0–0.2)
BASOPHILS NFR BLD AUTO: 0.4 % (ref 0–1.5)
BILIRUB SERPL-MCNC: 0.2 MG/DL (ref 0–1.2)
BUN SERPL-MCNC: 8 MG/DL (ref 8–23)
BUN/CREAT SERPL: 7.7 (ref 7–25)
CALCIUM SPEC-SCNC: 9.4 MG/DL (ref 8.6–10.5)
CHLORIDE SERPL-SCNC: 100 MMOL/L (ref 98–107)
CO2 SERPL-SCNC: 25 MMOL/L (ref 22–29)
CREAT SERPL-MCNC: 1.04 MG/DL (ref 0.76–1.27)
DEPRECATED RDW RBC AUTO: 56.7 FL (ref 37–54)
EGFRCR SERPLBLD CKD-EPI 2021: 82.2 ML/MIN/1.73
EOSINOPHIL # BLD AUTO: 0.42 10*3/MM3 (ref 0–0.4)
EOSINOPHIL NFR BLD AUTO: 7.8 % (ref 0.3–6.2)
ERYTHROCYTE [DISTWIDTH] IN BLOOD BY AUTOMATED COUNT: 14.9 % (ref 12.3–15.4)
GLOBULIN UR ELPH-MCNC: 3.5 GM/DL
GLUCOSE BLDC GLUCOMTR-MCNC: 160 MG/DL (ref 70–105)
GLUCOSE SERPL-MCNC: 142 MG/DL (ref 65–99)
HCT VFR BLD AUTO: 32.4 % (ref 37.5–51)
HGB BLD-MCNC: 10.3 G/DL (ref 13–17.7)
LYMPHOCYTES # BLD AUTO: 0.75 10*3/MM3 (ref 0.7–3.1)
LYMPHOCYTES NFR BLD AUTO: 14 % (ref 19.6–45.3)
MCH RBC QN AUTO: 33.7 PG (ref 26.6–33)
MCHC RBC AUTO-ENTMCNC: 31.8 G/DL (ref 31.5–35.7)
MCV RBC AUTO: 105.9 FL (ref 79–97)
MONOCYTES # BLD AUTO: 0.49 10*3/MM3 (ref 0.1–0.9)
MONOCYTES NFR BLD AUTO: 9.1 % (ref 5–12)
NEUTROPHILS NFR BLD AUTO: 3.69 10*3/MM3 (ref 1.7–7)
NEUTROPHILS NFR BLD AUTO: 68.7 % (ref 42.7–76)
PLATELET # BLD AUTO: 196 10*3/MM3 (ref 140–450)
PMV BLD AUTO: 9.1 FL (ref 6–12)
POTASSIUM SERPL-SCNC: 3.5 MMOL/L (ref 3.5–5.2)
PROT SERPL-MCNC: 7 G/DL (ref 6–8.5)
RAD ONC ARIA COURSE END DATE: NORMAL
RAD ONC ARIA COURSE ID: NORMAL
RAD ONC ARIA COURSE LAST TREATMENT DATE: NORMAL
RAD ONC ARIA COURSE START DATE: NORMAL
RAD ONC ARIA COURSE TREATMENT ELAPSED DAYS: 13
RAD ONC ARIA FIRST TREATMENT DATE: NORMAL
RAD ONC ARIA PLAN FRACTIONS TREATED TO DATE: 10
RAD ONC ARIA PLAN ID: NORMAL
RAD ONC ARIA PLAN NAME: NORMAL
RAD ONC ARIA PLAN PRESCRIBED DOSE PER FRACTION: 3 GY
RAD ONC ARIA PLAN PRIMARY REFERENCE POINT: NORMAL
RAD ONC ARIA PLAN TOTAL FRACTIONS PRESCRIBED: 10
RAD ONC ARIA PLAN TOTAL PRESCRIBED DOSE: 3000 CGY
RAD ONC ARIA REFERENCE POINT DOSAGE GIVEN TO DATE: 30 GY
RAD ONC ARIA REFERENCE POINT ID: NORMAL
RBC # BLD AUTO: 3.06 10*6/MM3 (ref 4.14–5.8)
SODIUM SERPL-SCNC: 138 MMOL/L (ref 136–145)
WBC NRBC COR # BLD AUTO: 5.37 10*3/MM3 (ref 3.4–10.8)

## 2024-02-22 PROCEDURE — 25010000002 ATEZOLIZUMAB 1200 MG/20ML SOLUTION 20 ML VIAL: Performed by: INTERNAL MEDICINE

## 2024-02-22 PROCEDURE — 96413 CHEMO IV INFUSION 1 HR: CPT

## 2024-02-22 PROCEDURE — 80053 COMPREHEN METABOLIC PANEL: CPT | Performed by: INTERNAL MEDICINE

## 2024-02-22 PROCEDURE — 25810000003 SODIUM CHLORIDE 0.9 % SOLUTION: Performed by: INTERNAL MEDICINE

## 2024-02-22 PROCEDURE — 25010000002 HEPARIN LOCK FLUSH PER 10 UNITS: Performed by: INTERNAL MEDICINE

## 2024-02-22 PROCEDURE — 85025 COMPLETE CBC W/AUTO DIFF WBC: CPT | Performed by: INTERNAL MEDICINE

## 2024-02-22 PROCEDURE — 82948 REAGENT STRIP/BLOOD GLUCOSE: CPT

## 2024-02-22 PROCEDURE — 25810000003 SODIUM CHLORIDE 0.9 % SOLUTION 250 ML FLEX CONT: Performed by: INTERNAL MEDICINE

## 2024-02-22 RX ORDER — SODIUM CHLORIDE 0.9 % (FLUSH) 0.9 %
10 SYRINGE (ML) INJECTION AS NEEDED
Status: DISCONTINUED | OUTPATIENT
Start: 2024-02-22 | End: 2024-02-23 | Stop reason: HOSPADM

## 2024-02-22 RX ORDER — HEPARIN SODIUM (PORCINE) LOCK FLUSH IV SOLN 100 UNIT/ML 100 UNIT/ML
500 SOLUTION INTRAVENOUS AS NEEDED
Status: DISCONTINUED | OUTPATIENT
Start: 2024-02-22 | End: 2024-02-23 | Stop reason: HOSPADM

## 2024-02-22 RX ORDER — SODIUM CHLORIDE 9 MG/ML
20 INJECTION, SOLUTION INTRAVENOUS ONCE
Status: COMPLETED | OUTPATIENT
Start: 2024-02-22 | End: 2024-02-22

## 2024-02-22 RX ORDER — SODIUM CHLORIDE 0.9 % (FLUSH) 0.9 %
10 SYRINGE (ML) INJECTION AS NEEDED
OUTPATIENT
Start: 2024-02-22

## 2024-02-22 RX ORDER — HEPARIN SODIUM (PORCINE) LOCK FLUSH IV SOLN 100 UNIT/ML 100 UNIT/ML
500 SOLUTION INTRAVENOUS AS NEEDED
OUTPATIENT
Start: 2024-02-22

## 2024-02-22 RX ADMIN — Medication 10 ML: at 12:25

## 2024-02-22 RX ADMIN — ATEZOLIZUMAB 1200 MG: 1200 INJECTION, SOLUTION INTRAVENOUS at 11:47

## 2024-02-22 RX ADMIN — SODIUM CHLORIDE 20 ML/HR: 9 INJECTION, SOLUTION INTRAVENOUS at 11:47

## 2024-02-22 RX ADMIN — HEPARIN 500 UNITS: 100 SYRINGE at 12:26

## 2024-02-22 NOTE — PATIENT INSTRUCTIONS
Start with Tylenol ES or ibuprofen or Aleve and topical analgesics and we can advance from there if not relieved. The NSAIDS are only Ok if kidney function is good and no history of GI bleeding, otherwise stick to Tylenol. Can't really recommend anything for sleep with already being on remeron and Klonopin-Check with his psychiatrist that prescribes those meds for possible adjustment. It would be OK to try melatonin in the interim. Also adding a CT thoracic spine to his upcoming scans due to the new pain.

## 2024-02-22 NOTE — PROGRESS NOTES
Pt in office today for tecentriq c6d1. Pt denied any issues except constipation, pain between shoulder blades and trouble sleeping at times. Pt is taking senekot for constipation and it is helping. Pt's pain is a 4/10. He stated that is a intermittent sharp shooting pain in between his shoulder blades. Pt is taking remerron 45mg po qhs and Klonopin 1mg po bid for sleep. [facility] accessed without difficulty positive blood return. Flushed with 10cc saline, 10cc wasted and cbc, cmp drawn. Pt stated that he had some pain meds at home podriatrist prescribed 4 yrs ago. He couldn't remember the name of them. He stated that he hasn't taken any tylenol or nsaids. Екатерина Fisher NP notified. Orders to start with Tylenol ES or ibuprofen or Aleve and topical analgesics and we can advance from there if not relived. The NSAIDS are only Ok if kidney function is good and no hx. of GI bleeding, otherwise stick to Tylenol. Explained orders to pt and dtr and v/u. Recommend he check with his psychiatrist that prescribes those for possible adjustment to remeron and klonopin. He v/u taking melatonin in the meantime. We will also add a CT thoracic spine to his upcoming scans due to the new pain. Pt and dtr v/u to orders.

## 2024-02-23 ENCOUNTER — DOCUMENTATION (OUTPATIENT)
Dept: RADIATION ONCOLOGY | Facility: HOSPITAL | Age: 59
End: 2024-02-23
Payer: COMMERCIAL

## 2024-02-23 NOTE — PROGRESS NOTES
Patient Name: Romero Maciel    Date: 2024  : 1965       MRN: [id number]     Referring Physician: No ref. provider found  DX: No diagnosis found.     COMPLETION NOTE      Primary Radiation Oncologist: Vineet Fink MD    DIAGNOSIS AND STAGE:   stage IV Lung Cancer       Mr. Aimee Maciel is a 59-year-old gentleman who presented with stage IV non-small cell carcinoma of the lung.  Patient has a 60-pack-year history of smoking and presented with weight loss of over 20 pounds.  He was evaluated with CT as well as MRI of the brain.  Unfortunately, patient was noted to have a 1.7 cm peripheral enhancing mass of the right occipital lobe and was referred for   Consideration of external beam radiotherapy.  The decision was made to proceed with palliative chemoimmunotherapy initially and patient then returned for external beam therapy.  He began whole brain radiotherapy to 24 with feels as follows:    Area: Whole brain    Energy 6 MV    Treatment [place], 2024 until 2024 corresponding to 10 treatments over 14 elapsed days.    Dose per fraction 300 cGy, total dose 3000 cGy                                    DISPOSITION: In addition to radiotherapy, patient all so received Namenda and will continue this for a full 5 months after radiation.  Patient will be seen in 1 month for further follow-up evaluation.      Current Outpatient Medications:     albuterol sulfate  (90 Base) MCG/ACT inhaler, Inhale 2 puffs Every 4 (Four) Hours As Needed for Wheezing., Disp: 18 g, Rfl: 0    budesonide (PULMICORT) 0.5 MG/2ML nebulizer solution, Take 2 mL by nebulization 2 (Two) Times a Day., Disp: 120 mL, Rfl: 0    clonazePAM (KlonoPIN) 1 MG tablet, Take 1 tablet by mouth 2 (Two) Times a Day As Needed., Disp: , Rfl:     doxepin (SINEquan) 75 MG capsule, Take 1 capsule by mouth Every Night., Disp: , Rfl:     guaiFENesin (MUCINEX) 600 MG 12 hr tablet, Take 2 tablets by mouth 2 (Two) Times a Day. (Patient not taking:  Reported on 10/25/2023), Disp: , Rfl:     ipratropium-albuterol (DUO-NEB) 0.5-2.5 mg/3 ml nebulizer, Take 3 mL by nebulization Every 4 (Four) Hours As Needed for Wheezing., Disp: 180 mL, Rfl: 0    lidocaine-prilocaine (EMLA) 2.5-2.5 % cream, Apply 1 application  topically to the appropriate area as directed See Admin Instructions. Apply to port site one hour prior to port being accessed., Disp: 30 g, Rfl: 3    LORazepam (Ativan) 1 MG tablet, Take 1 tablet by mouth Daily. On MRI and Radiation days, Disp: 11 tablet, Rfl: 0    memantine (Namenda Titration Jose J) 28 x 5 MG & 21 x 10 MG tablet pack, Follow package directions., Disp: 49 tablet, Rfl: 0    memantine (NAMENDA) 10 MG tablet, Take 1 tablet by mouth 2 (Two) Times a Day., Disp: 60 tablet, Rfl: 4    mirtazapine (REMERON) 45 MG tablet, Take 1 tablet by mouth Every Night., Disp: , Rfl:     Nirmatrelvir & Ritonavir, 300mg/100mg, (PAXLOVID) 20 x 150 MG & 10 x 100MG tablet therapy pack tablet, Take 3 tablets by mouth 2 (Two) Times a Day. Take as directed twice daily x 5 days (Patient not taking: Reported on 2/13/2024), Disp: 1 each, Rfl: 0    OLANZapine (zyPREXA) 5 MG tablet, Take 1 tablet by mouth Every Night. Take nightly x 4 starting night of chemotherapy. (Patient not taking: Reported on 2/13/2024), Disp: 4 tablet, Rfl: 3    ondansetron (ZOFRAN) 8 MG tablet, Take 1 tablet by mouth 3 (Three) Times a Day As Needed for Nausea or Vomiting., Disp: 30 tablet, Rfl: 5  No current facility-administered medications for this visit.    KPS: 70:  Cares for self; unable to carry on nomal activity            cc:         Approved Electronically By:  Vineet Fink MD, 2/23/2024, 13:14 EST                              Confidentiality of this medical record shall be maintained except when use or disclosure is required or permitted by law, regulation or written authorization by the patient.

## 2024-02-26 ENCOUNTER — APPOINTMENT (OUTPATIENT)
Dept: CT IMAGING | Facility: HOSPITAL | Age: 59
End: 2024-02-26
Payer: COMMERCIAL

## 2024-02-26 ENCOUNTER — HOSPITAL ENCOUNTER (INPATIENT)
Facility: HOSPITAL | Age: 59
LOS: 1 days | Discharge: LEFT BEFORE DISCHARGE | End: 2024-02-27
Attending: EMERGENCY MEDICINE | Admitting: STUDENT IN AN ORGANIZED HEALTH CARE EDUCATION/TRAINING PROGRAM
Payer: COMMERCIAL

## 2024-02-26 ENCOUNTER — APPOINTMENT (OUTPATIENT)
Dept: GENERAL RADIOLOGY | Facility: HOSPITAL | Age: 59
End: 2024-02-26
Payer: COMMERCIAL

## 2024-02-26 DIAGNOSIS — C34.90 SMALL CELL LUNG CANCER: Primary | ICD-10-CM

## 2024-02-26 DIAGNOSIS — R91.8 LUNG MASS: ICD-10-CM

## 2024-02-26 DIAGNOSIS — J18.9 PNEUMONIA DUE TO INFECTIOUS ORGANISM, UNSPECIFIED LATERALITY, UNSPECIFIED PART OF LUNG: Primary | ICD-10-CM

## 2024-02-26 LAB
ALBUMIN SERPL-MCNC: 3.6 G/DL (ref 3.5–5.2)
ALBUMIN/GLOB SERPL: 1.1 G/DL
ALP SERPL-CCNC: 61 U/L (ref 39–117)
ALT SERPL W P-5'-P-CCNC: 10 U/L (ref 1–41)
ANION GAP SERPL CALCULATED.3IONS-SCNC: 12 MMOL/L (ref 5–15)
AST SERPL-CCNC: 18 U/L (ref 1–40)
B PARAPERT DNA SPEC QL NAA+PROBE: NOT DETECTED
B PERT DNA SPEC QL NAA+PROBE: NOT DETECTED
BASOPHILS # BLD AUTO: 0 10*3/MM3 (ref 0–0.2)
BASOPHILS NFR BLD AUTO: 0.7 % (ref 0–1.5)
BILIRUB SERPL-MCNC: 0.2 MG/DL (ref 0–1.2)
BUN SERPL-MCNC: 6 MG/DL (ref 8–23)
BUN/CREAT SERPL: 6.4 (ref 7–25)
C PNEUM DNA NPH QL NAA+NON-PROBE: NOT DETECTED
CALCIUM SPEC-SCNC: 9.6 MG/DL (ref 8.6–10.5)
CHLORIDE SERPL-SCNC: 99 MMOL/L (ref 98–107)
CO2 SERPL-SCNC: 27 MMOL/L (ref 22–29)
CREAT SERPL-MCNC: 0.94 MG/DL (ref 0.76–1.27)
D DIMER PPP FEU-MCNC: 1.57 MG/L (FEU) (ref 0–0.6)
D-LACTATE SERPL-SCNC: 0.9 MMOL/L (ref 0.3–2)
DEPRECATED RDW RBC AUTO: 63 FL (ref 37–54)
EGFRCR SERPLBLD CKD-EPI 2021: 92.8 ML/MIN/1.73
EOSINOPHIL # BLD AUTO: 0.3 10*3/MM3 (ref 0–0.4)
EOSINOPHIL NFR BLD AUTO: 5.8 % (ref 0.3–6.2)
ERYTHROCYTE [DISTWIDTH] IN BLOOD BY AUTOMATED COUNT: 17.4 % (ref 12.3–15.4)
FLUAV SUBTYP SPEC NAA+PROBE: NOT DETECTED
FLUBV RNA ISLT QL NAA+PROBE: NOT DETECTED
GEN 5 2HR TROPONIN T REFLEX: 23 NG/L
GLOBULIN UR ELPH-MCNC: 3.4 GM/DL
GLUCOSE SERPL-MCNC: 117 MG/DL (ref 65–99)
HADV DNA SPEC NAA+PROBE: NOT DETECTED
HCOV 229E RNA SPEC QL NAA+PROBE: NOT DETECTED
HCOV HKU1 RNA SPEC QL NAA+PROBE: NOT DETECTED
HCOV NL63 RNA SPEC QL NAA+PROBE: NOT DETECTED
HCOV OC43 RNA SPEC QL NAA+PROBE: NOT DETECTED
HCT VFR BLD AUTO: 33.5 % (ref 37.5–51)
HGB BLD-MCNC: 10.9 G/DL (ref 13–17.7)
HMPV RNA NPH QL NAA+NON-PROBE: NOT DETECTED
HPIV1 RNA ISLT QL NAA+PROBE: NOT DETECTED
HPIV2 RNA SPEC QL NAA+PROBE: NOT DETECTED
HPIV3 RNA NPH QL NAA+PROBE: NOT DETECTED
HPIV4 P GENE NPH QL NAA+PROBE: NOT DETECTED
LYMPHOCYTES # BLD AUTO: 0.6 10*3/MM3 (ref 0.7–3.1)
LYMPHOCYTES NFR BLD AUTO: 11.5 % (ref 19.6–45.3)
M PNEUMO IGG SER IA-ACNC: NOT DETECTED
MCH RBC QN AUTO: 33.6 PG (ref 26.6–33)
MCHC RBC AUTO-ENTMCNC: 32.5 G/DL (ref 31.5–35.7)
MCV RBC AUTO: 103.3 FL (ref 79–97)
MONOCYTES # BLD AUTO: 0.4 10*3/MM3 (ref 0.1–0.9)
MONOCYTES NFR BLD AUTO: 8.1 % (ref 5–12)
NEUTROPHILS NFR BLD AUTO: 3.9 10*3/MM3 (ref 1.7–7)
NEUTROPHILS NFR BLD AUTO: 73.9 % (ref 42.7–76)
NRBC BLD AUTO-RTO: 0.1 /100 WBC (ref 0–0.2)
NT-PROBNP SERPL-MCNC: 60.3 PG/ML (ref 0–900)
PLATELET # BLD AUTO: 179 10*3/MM3 (ref 140–450)
PMV BLD AUTO: 7.6 FL (ref 6–12)
POTASSIUM SERPL-SCNC: 3.8 MMOL/L (ref 3.5–5.2)
PROT SERPL-MCNC: 7 G/DL (ref 6–8.5)
RBC # BLD AUTO: 3.24 10*6/MM3 (ref 4.14–5.8)
RHINOVIRUS RNA SPEC NAA+PROBE: NOT DETECTED
RSV RNA NPH QL NAA+NON-PROBE: NOT DETECTED
SARS-COV-2 RNA NPH QL NAA+NON-PROBE: NOT DETECTED
SODIUM SERPL-SCNC: 138 MMOL/L (ref 136–145)
TROPONIN T DELTA: 0 NG/L
TROPONIN T SERPL HS-MCNC: 23 NG/L
WBC NRBC COR # BLD AUTO: 5.3 10*3/MM3 (ref 3.4–10.8)

## 2024-02-26 PROCEDURE — 87040 BLOOD CULTURE FOR BACTERIA: CPT | Performed by: NURSE PRACTITIONER

## 2024-02-26 PROCEDURE — 36415 COLL VENOUS BLD VENIPUNCTURE: CPT

## 2024-02-26 PROCEDURE — 71275 CT ANGIOGRAPHY CHEST: CPT

## 2024-02-26 PROCEDURE — 71045 X-RAY EXAM CHEST 1 VIEW: CPT

## 2024-02-26 PROCEDURE — 85025 COMPLETE CBC W/AUTO DIFF WBC: CPT | Performed by: NURSE PRACTITIONER

## 2024-02-26 PROCEDURE — 83605 ASSAY OF LACTIC ACID: CPT

## 2024-02-26 PROCEDURE — 80053 COMPREHEN METABOLIC PANEL: CPT | Performed by: NURSE PRACTITIONER

## 2024-02-26 PROCEDURE — 84484 ASSAY OF TROPONIN QUANT: CPT | Performed by: NURSE PRACTITIONER

## 2024-02-26 PROCEDURE — 0202U NFCT DS 22 TRGT SARS-COV-2: CPT | Performed by: NURSE PRACTITIONER

## 2024-02-26 PROCEDURE — 83880 ASSAY OF NATRIURETIC PEPTIDE: CPT | Performed by: NURSE PRACTITIONER

## 2024-02-26 PROCEDURE — 93005 ELECTROCARDIOGRAM TRACING: CPT

## 2024-02-26 PROCEDURE — 99285 EMERGENCY DEPT VISIT HI MDM: CPT

## 2024-02-26 PROCEDURE — 25510000001 IOPAMIDOL PER 1 ML: Performed by: EMERGENCY MEDICINE

## 2024-02-26 PROCEDURE — 85379 FIBRIN DEGRADATION QUANT: CPT | Performed by: NURSE PRACTITIONER

## 2024-02-26 PROCEDURE — 25010000002 MORPHINE PER 10 MG: Performed by: NURSE PRACTITIONER

## 2024-02-26 PROCEDURE — 93005 ELECTROCARDIOGRAM TRACING: CPT | Performed by: EMERGENCY MEDICINE

## 2024-02-26 PROCEDURE — 96375 TX/PRO/DX INJ NEW DRUG ADDON: CPT

## 2024-02-26 PROCEDURE — 25010000002 ONDANSETRON PER 1 MG: Performed by: NURSE PRACTITIONER

## 2024-02-26 PROCEDURE — 83605 ASSAY OF LACTIC ACID: CPT | Performed by: NURSE PRACTITIONER

## 2024-02-26 RX ORDER — LEVOFLOXACIN 5 MG/ML
750 INJECTION, SOLUTION INTRAVENOUS ONCE
Qty: 150 ML | Refills: 0 | Status: COMPLETED | OUTPATIENT
Start: 2024-02-26 | End: 2024-02-27

## 2024-02-26 RX ORDER — ONDANSETRON 2 MG/ML
4 INJECTION INTRAMUSCULAR; INTRAVENOUS ONCE
Status: COMPLETED | OUTPATIENT
Start: 2024-02-26 | End: 2024-02-26

## 2024-02-26 RX ORDER — SODIUM CHLORIDE 0.9 % (FLUSH) 0.9 %
10 SYRINGE (ML) INJECTION AS NEEDED
Status: DISCONTINUED | OUTPATIENT
Start: 2024-02-26 | End: 2024-02-27 | Stop reason: HOSPADM

## 2024-02-26 RX ORDER — HYDROCODONE BITARTRATE AND ACETAMINOPHEN 5; 325 MG/1; MG/1
1 TABLET ORAL EVERY 6 HOURS PRN
Qty: 90 TABLET | Refills: 0 | Status: SHIPPED | OUTPATIENT
Start: 2024-02-26 | End: 2024-02-27

## 2024-02-26 RX ADMIN — MORPHINE SULFATE 4 MG: 4 INJECTION, SOLUTION INTRAMUSCULAR; INTRAVENOUS at 19:45

## 2024-02-26 RX ADMIN — IOPAMIDOL 100 ML: 755 INJECTION, SOLUTION INTRAVENOUS at 22:25

## 2024-02-26 RX ADMIN — ONDANSETRON 4 MG: 2 INJECTION INTRAMUSCULAR; INTRAVENOUS at 19:45

## 2024-02-26 NOTE — Clinical Note
Level of Care: Telemetry [5]   Diagnosis: Dyspnea [152049]   Admitting Physician: AME LAIRD [[id number]]   Attending Physician: KELVIN MONGE [[id number]]   Certification: I Certify That Inpatient Hospital Services Are Medically Necessary For Greater Than 2 Midnights

## 2024-02-27 ENCOUNTER — APPOINTMENT (OUTPATIENT)
Dept: MRI IMAGING | Facility: HOSPITAL | Age: 59
End: 2024-02-27
Payer: COMMERCIAL

## 2024-02-27 VITALS
WEIGHT: 135 LBS | SYSTOLIC BLOOD PRESSURE: 108 MMHG | BODY MASS INDEX: 18.9 KG/M2 | DIASTOLIC BLOOD PRESSURE: 71 MMHG | HEIGHT: 71 IN | OXYGEN SATURATION: 96 % | RESPIRATION RATE: 20 BRPM | TEMPERATURE: 97.9 F | HEART RATE: 97 BPM

## 2024-02-27 PROBLEM — R06.00 DYSPNEA: Status: ACTIVE | Noted: 2024-02-27

## 2024-02-27 LAB
ANION GAP SERPL CALCULATED.3IONS-SCNC: 9 MMOL/L (ref 5–15)
BUN SERPL-MCNC: 5 MG/DL (ref 8–23)
BUN/CREAT SERPL: 5.1 (ref 7–25)
CALCIUM SPEC-SCNC: 9.4 MG/DL (ref 8.6–10.5)
CHLORIDE SERPL-SCNC: 98 MMOL/L (ref 98–107)
CO2 SERPL-SCNC: 30 MMOL/L (ref 22–29)
CREAT SERPL-MCNC: 0.98 MG/DL (ref 0.76–1.27)
D-LACTATE SERPL-SCNC: 1.1 MMOL/L (ref 0.5–2)
DEPRECATED RDW RBC AUTO: 61.7 FL (ref 37–54)
EGFRCR SERPLBLD CKD-EPI 2021: 88.3 ML/MIN/1.73
ERYTHROCYTE [DISTWIDTH] IN BLOOD BY AUTOMATED COUNT: 17.1 % (ref 12.3–15.4)
GLUCOSE SERPL-MCNC: 110 MG/DL (ref 65–99)
HCT VFR BLD AUTO: 31.2 % (ref 37.5–51)
HGB BLD-MCNC: 10.1 G/DL (ref 13–17.7)
L PNEUMO1 AG UR QL IA: NEGATIVE
MCH RBC QN AUTO: 33.2 PG (ref 26.6–33)
MCHC RBC AUTO-ENTMCNC: 32.4 G/DL (ref 31.5–35.7)
MCV RBC AUTO: 102.5 FL (ref 79–97)
MRSA DNA SPEC QL NAA+PROBE: NORMAL
PLATELET # BLD AUTO: 179 10*3/MM3 (ref 140–450)
PMV BLD AUTO: 7.2 FL (ref 6–12)
POTASSIUM SERPL-SCNC: 3.4 MMOL/L (ref 3.5–5.2)
QT INTERVAL: 381 MS
QTC INTERVAL: 455 MS
RBC # BLD AUTO: 3.04 10*6/MM3 (ref 4.14–5.8)
S PNEUM AG SPEC QL LA: NEGATIVE
SODIUM SERPL-SCNC: 137 MMOL/L (ref 136–145)
WBC NRBC COR # BLD AUTO: 4.7 10*3/MM3 (ref 3.4–10.8)

## 2024-02-27 PROCEDURE — 25010000002 METHYLPREDNISOLONE PER 40 MG: Performed by: INTERNAL MEDICINE

## 2024-02-27 PROCEDURE — 97166 OT EVAL MOD COMPLEX 45 MIN: CPT

## 2024-02-27 PROCEDURE — 25010000002 LEVOFLOXACIN PER 250 MG: Performed by: NURSE PRACTITIONER

## 2024-02-27 PROCEDURE — 70553 MRI BRAIN STEM W/O & W/DYE: CPT

## 2024-02-27 PROCEDURE — 80048 BASIC METABOLIC PNL TOTAL CA: CPT | Performed by: STUDENT IN AN ORGANIZED HEALTH CARE EDUCATION/TRAINING PROGRAM

## 2024-02-27 PROCEDURE — 97162 PT EVAL MOD COMPLEX 30 MIN: CPT | Performed by: PHYSICAL THERAPIST

## 2024-02-27 PROCEDURE — 25010000002 GADOTERIDOL PER 1 ML: Performed by: INTERNAL MEDICINE

## 2024-02-27 PROCEDURE — A9579 GAD-BASE MR CONTRAST NOS,1ML: HCPCS | Performed by: INTERNAL MEDICINE

## 2024-02-27 PROCEDURE — 85027 COMPLETE CBC AUTOMATED: CPT | Performed by: STUDENT IN AN ORGANIZED HEALTH CARE EDUCATION/TRAINING PROGRAM

## 2024-02-27 PROCEDURE — 99222 1ST HOSP IP/OBS MODERATE 55: CPT | Performed by: INTERNAL MEDICINE

## 2024-02-27 PROCEDURE — 96365 THER/PROPH/DIAG IV INF INIT: CPT

## 2024-02-27 PROCEDURE — 87449 NOS EACH ORGANISM AG IA: CPT | Performed by: STUDENT IN AN ORGANIZED HEALTH CARE EDUCATION/TRAINING PROGRAM

## 2024-02-27 PROCEDURE — 96375 TX/PRO/DX INJ NEW DRUG ADDON: CPT

## 2024-02-27 PROCEDURE — 87899 AGENT NOS ASSAY W/OPTIC: CPT | Performed by: STUDENT IN AN ORGANIZED HEALTH CARE EDUCATION/TRAINING PROGRAM

## 2024-02-27 PROCEDURE — 87641 MR-STAPH DNA AMP PROBE: CPT | Performed by: STUDENT IN AN ORGANIZED HEALTH CARE EDUCATION/TRAINING PROGRAM

## 2024-02-27 PROCEDURE — 25810000003 LACTATED RINGERS PER 1000 ML: Performed by: STUDENT IN AN ORGANIZED HEALTH CARE EDUCATION/TRAINING PROGRAM

## 2024-02-27 PROCEDURE — 96372 THER/PROPH/DIAG INJ SC/IM: CPT

## 2024-02-27 PROCEDURE — 25010000002 HEPARIN (PORCINE) PER 1000 UNITS: Performed by: INTERNAL MEDICINE

## 2024-02-27 RX ORDER — ALBUTEROL SULFATE 2.5 MG/3ML
2.5 SOLUTION RESPIRATORY (INHALATION) EVERY 4 HOURS PRN
Status: DISCONTINUED | OUTPATIENT
Start: 2024-02-27 | End: 2024-02-27 | Stop reason: HOSPADM

## 2024-02-27 RX ORDER — SODIUM CHLORIDE 9 MG/ML
40 INJECTION, SOLUTION INTRAVENOUS AS NEEDED
Status: DISCONTINUED | OUTPATIENT
Start: 2024-02-27 | End: 2024-02-27 | Stop reason: HOSPADM

## 2024-02-27 RX ORDER — IBUPROFEN 200 MG
200 TABLET ORAL EVERY 6 HOURS PRN
COMMUNITY

## 2024-02-27 RX ORDER — SODIUM CHLORIDE 0.9 % (FLUSH) 0.9 %
10 SYRINGE (ML) INJECTION EVERY 12 HOURS SCHEDULED
Status: DISCONTINUED | OUTPATIENT
Start: 2024-02-27 | End: 2024-02-27 | Stop reason: HOSPADM

## 2024-02-27 RX ORDER — ONDANSETRON 2 MG/ML
4 INJECTION INTRAMUSCULAR; INTRAVENOUS EVERY 6 HOURS PRN
Status: DISCONTINUED | OUTPATIENT
Start: 2024-02-27 | End: 2024-02-27 | Stop reason: HOSPADM

## 2024-02-27 RX ORDER — POTASSIUM CHLORIDE 20 MEQ/1
40 TABLET, EXTENDED RELEASE ORAL EVERY 4 HOURS
Status: COMPLETED | OUTPATIENT
Start: 2024-02-27 | End: 2024-02-27

## 2024-02-27 RX ORDER — METHYLPREDNISOLONE SODIUM SUCCINATE 40 MG/ML
40 INJECTION, POWDER, LYOPHILIZED, FOR SOLUTION INTRAMUSCULAR; INTRAVENOUS EVERY 8 HOURS
Status: DISCONTINUED | OUTPATIENT
Start: 2024-02-27 | End: 2024-02-27 | Stop reason: HOSPADM

## 2024-02-27 RX ORDER — BUDESONIDE 0.5 MG/2ML
0.5 INHALANT ORAL
Status: DISCONTINUED | OUTPATIENT
Start: 2024-02-27 | End: 2024-02-27 | Stop reason: HOSPADM

## 2024-02-27 RX ORDER — ACETAMINOPHEN 325 MG/1
650 TABLET ORAL EVERY 6 HOURS PRN
COMMUNITY

## 2024-02-27 RX ORDER — SODIUM CHLORIDE, SODIUM LACTATE, POTASSIUM CHLORIDE, CALCIUM CHLORIDE 600; 310; 30; 20 MG/100ML; MG/100ML; MG/100ML; MG/100ML
50 INJECTION, SOLUTION INTRAVENOUS CONTINUOUS
Status: DISCONTINUED | OUTPATIENT
Start: 2024-02-27 | End: 2024-02-27 | Stop reason: HOSPADM

## 2024-02-27 RX ORDER — SODIUM CHLORIDE 0.9 % (FLUSH) 0.9 %
10 SYRINGE (ML) INJECTION AS NEEDED
Status: DISCONTINUED | OUTPATIENT
Start: 2024-02-27 | End: 2024-02-27 | Stop reason: HOSPADM

## 2024-02-27 RX ORDER — ONDANSETRON 4 MG/1
4 TABLET, ORALLY DISINTEGRATING ORAL EVERY 6 HOURS PRN
Status: DISCONTINUED | OUTPATIENT
Start: 2024-02-27 | End: 2024-02-27 | Stop reason: HOSPADM

## 2024-02-27 RX ORDER — NITROGLYCERIN 0.4 MG/1
0.4 TABLET SUBLINGUAL
Status: DISCONTINUED | OUTPATIENT
Start: 2024-02-27 | End: 2024-02-27 | Stop reason: HOSPADM

## 2024-02-27 RX ORDER — LEVOFLOXACIN 5 MG/ML
750 INJECTION, SOLUTION INTRAVENOUS EVERY 24 HOURS
Qty: 1050 ML | Refills: 0 | Status: DISCONTINUED | OUTPATIENT
Start: 2024-02-28 | End: 2024-02-27 | Stop reason: HOSPADM

## 2024-02-27 RX ORDER — LORAZEPAM 1 MG/1
1 TABLET ORAL EVERY 6 HOURS PRN
Status: DISCONTINUED | OUTPATIENT
Start: 2024-02-27 | End: 2024-02-27 | Stop reason: HOSPADM

## 2024-02-27 RX ORDER — HEPARIN SODIUM 5000 [USP'U]/ML
5000 INJECTION, SOLUTION INTRAVENOUS; SUBCUTANEOUS EVERY 8 HOURS SCHEDULED
Status: DISCONTINUED | OUTPATIENT
Start: 2024-02-27 | End: 2024-02-27 | Stop reason: HOSPADM

## 2024-02-27 RX ADMIN — SODIUM CHLORIDE, POTASSIUM CHLORIDE, SODIUM LACTATE AND CALCIUM CHLORIDE 50 ML/HR: 600; 310; 30; 20 INJECTION, SOLUTION INTRAVENOUS at 02:14

## 2024-02-27 RX ADMIN — LORAZEPAM 1 MG: 1 TABLET ORAL at 02:48

## 2024-02-27 RX ADMIN — GADOTERIDOL 13 ML: 279.3 INJECTION, SOLUTION INTRAVENOUS at 16:41

## 2024-02-27 RX ADMIN — LORAZEPAM 1 MG: 1 TABLET ORAL at 14:49

## 2024-02-27 RX ADMIN — METHYLPREDNISOLONE SODIUM SUCCINATE 40 MG: 40 INJECTION, POWDER, FOR SOLUTION INTRAMUSCULAR; INTRAVENOUS at 11:37

## 2024-02-27 RX ADMIN — LEVOFLOXACIN 750 MG: 5 INJECTION, SOLUTION INTRAVENOUS at 00:06

## 2024-02-27 RX ADMIN — POTASSIUM CHLORIDE 40 MEQ: 1500 TABLET, EXTENDED RELEASE ORAL at 13:31

## 2024-02-27 RX ADMIN — Medication 10 ML: at 08:31

## 2024-02-27 RX ADMIN — Medication 10 ML: at 02:15

## 2024-02-27 RX ADMIN — POTASSIUM CHLORIDE 40 MEQ: 1500 TABLET, EXTENDED RELEASE ORAL at 08:42

## 2024-02-27 RX ADMIN — HEPARIN SODIUM 5000 UNITS: 5000 INJECTION, SOLUTION INTRAVENOUS; SUBCUTANEOUS at 14:49

## 2024-02-27 NOTE — CONSULTS
Hematology/Oncology Inpatient Consultation    Patient name: Romero Maciel  : 1965  MRN: [id number]  Referring Provider: Dr. Torres  Reason for Consultation: SCLC    Chief complaint: Dyspnea and dysphonia    History of present illness:    Romero Maciel is a 59 y.o. male who presented to [facility] on 2024 with complaints of dyspnea complicated by voice loss.  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  Patient stated that the afternoon of presentation he became dyspneic and was unable to cough this was accompanied by loss of voice and slight chest discomfort.  Denied any numbness, tingling, facial droop, slurred speech, fever, chills.  Actively receiving immunotherapy after completion of 4 cycles of chemotherapy and radiation to brain met.    2024: CT PE protocol  -Negative for PE  -Significant enlargement of the left upper lobe pulmonary mass no invading the mediastinum with complete constriction of the right main pulmonary artery, right mainstem bronchus, and right upper lobe pulmonary vein.  New postobstructive inflammatory/infectious changes involving the right upper lobe, right middle lobe, right lower lobe    24  Hematology/Oncology was consulted on a patient known to us and established in the office with Dr. Rubio for his diagnosis of extensive stage small cell lung cancer with osseous metastasis and brain metastasis status post 4 cycles of chemoimmunotherapy and now on immunotherapy only.    He/She  has a past medical history of Cancer and Recurrent dislocation of knee (10/24/2023).    PCP: Jennifer Maynard APRN    History:  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   ,   Past Surgical History:   Procedure Laterality Date    BRONCHOSCOPY N/A 10/22/2023    Procedure: BRONCHOSCOPY WITH CRYO BIOPSY X1 AREA,  FINE NEEDLE ASPIRATION, AND  BRONCHOALVEOLAR LAVAGE;  Surgeon: Genia Jenkins MD;  Location: [facility] ENDOSCOPY;  Service: Pulmonary;  Laterality: N/A;  POST: LUNG MASS   ,   Family History   Problem Relation Age of Onset    Lung cancer Mother          at age 58    Lung cancer Brother          at age 40   ,   Social History     Tobacco Use    Smoking status: Former     Packs/day: 1.00     Years: 15.00     Additional pack years: 0.00     Total pack years: 15.00     Types: Cigarettes     Quit date: 10/2023     Years since quittin.4     Passive exposure: Current    Smokeless tobacco: Current   Vaping Use    Vaping Use: Never used   Substance Use Topics    Alcohol use: Never    Drug use: Never   , (Not in a hospital admission)  , Scheduled Meds:  budesonide, 0.5 mg, Nebulization, BID - RT  heparin (porcine), 5,000 Units, Subcutaneous, Q8H  [START ON 2024] levoFLOXacin, 750 mg, Intravenous, Q24H  methylPREDNISolone sodium succinate, 40 mg, Intravenous, Q8H  sodium chloride, 10 mL, Intravenous, Q12H    , Continuous Infusions:  lactated ringers, 50 mL/hr, Last Rate: 50 mL/hr (24 0635)    , PRN Meds:    albuterol    Calcium Replacement - Follow Nurse / BPA Driven Protocol    LORazepam    Magnesium Standard Dose Replacement - Follow Nurse / BPA Driven Protocol    nitroglycerin    ondansetron ODT **OR** ondansetron    Phosphorus Replacement - Follow Nurse / BPA Driven Protocol    Potassium Replacement - Follow Nurse / BPA Driven Protocol    [COMPLETED] Insert Peripheral IV **AND** sodium chloride    sodium chloride    sodium chloride   Allergies:  Penicillins    Subjective     ROS:  Review of Systems   Constitutional:  Negative for fatigue and fever.   HENT:  Negative for congestion and nosebleeds.    Eyes:  Negative for pain.   Respiratory:  Negative for cough and shortness of breath.    Cardiovascular:  Negative for chest pain.   Gastrointestinal:  Negative for abdominal pain, blood in stool, diarrhea, nausea and vomiting.  "  Endocrine: Negative for cold intolerance and heat intolerance.   Genitourinary:  Negative for difficulty urinating.   Musculoskeletal:  Negative for arthralgias.   Skin:  Negative for rash.   Neurological:  Negative for dizziness and headaches.   Hematological:  Does not bruise/bleed easily.   Psychiatric/Behavioral:  Negative for behavioral problems.         Objective   Vital Signs:   /66 (BP Location: Left arm, Patient Position: Lying)   Pulse 90   Temp 97.9 °F (36.6 °C) (Oral)   Resp 20   Ht 180.3 cm (71\")   Wt 61.2 kg (135 lb)   SpO2 94%   BMI 18.83 kg/m²     Physical Exam: (performed by MD)  Physical Exam  Constitutional:       Appearance: Normal appearance.   HENT:      Head: Normocephalic and atraumatic.   Eyes:      Pupils: Pupils are equal, round, and reactive to light.   Cardiovascular:      Rate and Rhythm: Normal rate and regular rhythm.      Pulses: Normal pulses.      Heart sounds: No murmur heard.  Pulmonary:      Effort: Pulmonary effort is normal.      Breath sounds: Normal breath sounds.   Abdominal:      General: There is no distension.      Palpations: Abdomen is soft. There is no mass.      Tenderness: There is no abdominal tenderness.   Musculoskeletal:         General: Normal range of motion.      Cervical back: Normal range of motion and neck supple.   Skin:     General: Skin is warm.   Neurological:      General: No focal deficit present.      Mental Status: He is alert.   Psychiatric:         Mood and Affect: Mood normal.         Results Review:  Lab Results (last 48 hours)       Procedure Component Value Units Date/Time    Basic Metabolic Panel [[id number]]  (Abnormal) Collected: 02/27/24 0410    Specimen: Blood Updated: 02/27/24 0441     Glucose 110 mg/dL      BUN 5 mg/dL      Creatinine 0.98 mg/dL      Sodium 137 mmol/L      Potassium 3.4 mmol/L      Chloride 98 mmol/L      CO2 30.0 mmol/L      Calcium 9.4 mg/dL      BUN/Creatinine Ratio 5.1     Anion Gap 9.0 mmol/L      " eGFR 88.3 mL/min/1.73     Narrative:      GFR Normal >60  Chronic Kidney Disease <60  Kidney Failure <15      CBC (No Diff) [[id number]]  (Abnormal) Collected: 02/27/24 0410    Specimen: Blood Updated: 02/27/24 0416     WBC 4.70 10*3/mm3      RBC 3.04 10*6/mm3      Hemoglobin 10.1 g/dL      Hematocrit 31.2 %      .5 fL      MCH 33.2 pg      MCHC 32.4 g/dL      RDW 17.1 %      RDW-SD 61.7 fl      MPV 7.2 fL      Platelets 179 10*3/mm3     MRSA Screen, PCR (Inpatient) - Swab, Nares [[id number]]  (Normal) Collected: 02/27/24 0217    Specimen: Swab from Nares Updated: 02/27/24 0343     MRSA PCR No MRSA Detected    Narrative:      The negative predictive value of this diagnostic test is high and should only be used to consider de-escalating anti-MRSA therapy. A positive result may indicate colonization with MRSA and must be correlated clinically.    Legionella Antigen, Urine - Urine, Urine, Clean Catch [[id number]]  (Normal) Collected: 02/27/24 0217    Specimen: Urine, Clean Catch Updated: 02/27/24 0239     LEGIONELLA ANTIGEN, URINE Negative    S. Pneumo Ag Urine or CSF - Urine, Urine, Clean Catch [[id number]]  (Normal) Collected: 02/27/24 0217    Specimen: Urine, Clean Catch Updated: 02/27/24 0239     Strep Pneumo Ag Negative    Lactic Acid, Plasma [[id number]]  (Normal) Collected: 02/26/24 2325    Specimen: Blood Updated: 02/27/24 0001     Lactate 1.1 mmol/L     POC Lactate [[id number]]  (Normal) Collected: 02/26/24 2332    Specimen: Blood Updated: 02/26/24 2334     Lactate 0.9 mmol/L      Comment: Serial Number: [id number]:  [id number]       Blood Culture - Blood, Arm, Right [[id number]] Collected: 02/26/24 2325    Specimen: Blood from Arm, Right Updated: 02/26/24 2331    Blood Culture - Blood, Arm, Right [[id number]] Collected: 02/26/24 2325    Specimen: Blood from Arm, Right Updated: 02/26/24 2331    High Sensitivity Troponin T 2Hr [[id number]]  (Abnormal) Collected: 02/26/24 2142    Specimen: Blood Updated:  02/26/24 2203     HS Troponin T 23 ng/L      Troponin T Delta 0 ng/L     Narrative:      High Sensitive Troponin T Reference Range:  <14.0 ng/L- Negative Female for AMI  <22.0 ng/L- Negative Male for AMI  >=14 - Abnormal Female indicating possible myocardial injury.  >=22 - Abnormal Male indicating possible myocardial injury.   Clinicians would have to utilize clinical acumen, EKG, Troponin, and serial changes to determine if it is an Acute Myocardial Infarction or myocardial injury due to an underlying chronic condition.         Respiratory Panel PCR w/COVID-19(SARS-CoV-2) KEESHA/CHAZ/JOSE/PAD/COR/PEDRO In-House, NP Swab in UTM/VTM, 2 HR TAT - Swab, Nasopharynx [[id number]]  (Normal) Collected: 02/26/24 1953    Specimen: Swab from Nasopharynx Updated: 02/26/24 2101     ADENOVIRUS, PCR Not Detected     Coronavirus 229E Not Detected     Coronavirus HKU1 Not Detected     Coronavirus NL63 Not Detected     Coronavirus OC43 Not Detected     COVID19 Not Detected     Human Metapneumovirus Not Detected     Human Rhinovirus/Enterovirus Not Detected     Influenza A PCR Not Detected     Influenza B PCR Not Detected     Parainfluenza Virus 1 Not Detected     Parainfluenza Virus 2 Not Detected     Parainfluenza Virus 3 Not Detected     Parainfluenza Virus 4 Not Detected     RSV, PCR Not Detected     Bordetella pertussis pcr Not Detected     Bordetella parapertussis PCR Not Detected     Chlamydophila pneumoniae PCR Not Detected     Mycoplasma pneumo by PCR Not Detected    Narrative:      In the setting of a positive respiratory panel with a viral infection PLUS a negative procalcitonin without other underlying concern for bacterial infection, consider observing off antibiotics or discontinuation of antibiotics and continue supportive care. If the respiratory panel is positive for atypical bacterial infection (Bordetella pertussis, Chlamydophila pneumoniae, or Mycoplasma pneumoniae), consider antibiotic de-escalation to target atypical  bacterial infection.    Comprehensive Metabolic Panel [[id number]]  (Abnormal) Collected: 02/26/24 1953    Specimen: Blood Updated: 02/26/24 2020     Glucose 117 mg/dL      BUN 6 mg/dL      Creatinine 0.94 mg/dL      Sodium 138 mmol/L      Potassium 3.8 mmol/L      Chloride 99 mmol/L      CO2 27.0 mmol/L      Calcium 9.6 mg/dL      Total Protein 7.0 g/dL      Albumin 3.6 g/dL      ALT (SGPT) 10 U/L      AST (SGOT) 18 U/L      Alkaline Phosphatase 61 U/L      Total Bilirubin 0.2 mg/dL      Globulin 3.4 gm/dL      A/G Ratio 1.1 g/dL      BUN/Creatinine Ratio 6.4     Anion Gap 12.0 mmol/L      eGFR 92.8 mL/min/1.73     Narrative:      GFR Normal >60  Chronic Kidney Disease <60  Kidney Failure <15      High Sensitivity Troponin T [[id number]]  (Abnormal) Collected: 02/26/24 1953    Specimen: Blood Updated: 02/26/24 2020     HS Troponin T 23 ng/L     Narrative:      High Sensitive Troponin T Reference Range:  <14.0 ng/L- Negative Female for AMI  <22.0 ng/L- Negative Male for AMI  >=14 - Abnormal Female indicating possible myocardial injury.  >=22 - Abnormal Male indicating possible myocardial injury.   Clinicians would have to utilize clinical acumen, EKG, Troponin, and serial changes to determine if it is an Acute Myocardial Infarction or myocardial injury due to an underlying chronic condition.         BNP [[id number]]  (Normal) Collected: 02/26/24 1953    Specimen: Blood Updated: 02/26/24 2020     proBNP 60.3 pg/mL     Narrative:      This assay is used as an aid in the diagnosis of individuals suspected of having heart failure. It can be used as an aid in the diagnosis of acute decompensated heart failure (ADHF) in patients presenting with signs and symptoms of ADHF to the emergency department (ED). In addition, NT-proBNP of <300 pg/mL indicates ADHF is not likely.    Age Range Result Interpretation  NT-proBNP Concentration (pg/mL:      <50             Positive            >450                   Alberto                  "300-450                    Negative             <300    50-75           Positive            >900                  Gray                300-900                  Negative            <300      >75             Positive            >1800                  Gray                300-1800                  Negative            <300    D-dimer, Quantitative [[id number]]  (Abnormal) Collected: 02/26/24 1953    Specimen: Blood Updated: 02/26/24 2009     D-Dimer, Quantitative 1.57 mg/L (FEU)     Narrative:      According to the assay 's published package insert, a normal (<0.50 mg/L (FEU)) D-dimer result in conjunction with a non-high clinical probability assessment, excludes deep vein thrombosis (DVT) and pulmonary embolism (PE) with high sensitivity.    D-dimer values increase with age and this can make VTE exclusion of an older population difficult. To address this, the [employer], based on best available evidence and recent guidelines, recommends that clinicians use age-adjusted D-dimer thresholds in patients greater than 50 years of age with: a) a low probability of PE who do not meet all Pulmonary Embolism Rule Out Criteria, or b) in those with intermediate probability of PE.   The formula for an age-adjusted D-dimer cut-off is \"age/100\".  For example, a 60 year old patient would have an age-adjusted cut-off of 0.60 mg/L (FEU) and an 80 year old 0.80 mg/L (FEU).    CBC & Differential [[id number]]  (Abnormal) Collected: 02/26/24 1953    Specimen: Blood Updated: 02/26/24 1957    Narrative:      The following orders were created for panel order CBC & Differential.  Procedure                               Abnormality         Status                     ---------                               -----------         ------                     CBC Auto Differential[[id number]]        Abnormal            Final result                 Please view results for these tests on the individual orders.    CBC Auto " Differential [[id number]]  (Abnormal) Collected: 02/26/24 1953    Specimen: Blood Updated: 02/26/24 1957     WBC 5.30 10*3/mm3      RBC 3.24 10*6/mm3      Hemoglobin 10.9 g/dL      Hematocrit 33.5 %      .3 fL      MCH 33.6 pg      MCHC 32.5 g/dL      RDW 17.4 %      RDW-SD 63.0 fl      MPV 7.6 fL      Platelets 179 10*3/mm3      Neutrophil % 73.9 %      Lymphocyte % 11.5 %      Monocyte % 8.1 %      Eosinophil % 5.8 %      Basophil % 0.7 %      Neutrophils, Absolute 3.90 10*3/mm3      Lymphocytes, Absolute 0.60 10*3/mm3      Monocytes, Absolute 0.40 10*3/mm3      Eosinophils, Absolute 0.30 10*3/mm3      Basophils, Absolute 0.00 10*3/mm3      nRBC 0.1 /100 WBC              Pending Results: MRI of the brain with and without    Imaging Reviewed:   CT Angiogram Chest Pulmonary Embolism    Result Date: 2/26/2024  Impression: 1.No evidence of pulmonary embolism. 2.Significant enlargement of the left upper lobe pulmonary mass now invading the mediastinum with complete constriction of the right main pulmonary artery, right main stem bronchus, and right upper lobe pulmonary vein. There are new postobstructive inflammatory/infectious changes involving the right upper lobe, right middle lobe, and right lower lobe. Correlate for signs of pneumonia. 3.Other stable findings. Electronically Signed: Corky Abrams MD  2/26/2024 10:41 PM EST  Workstation ID: [id number]    XR Chest 1 View    Result Date: 2/26/2024  Impression: Chronic changes of the right lung with possible new airspace opacity in the right lung base. This could represent superimposed infection. Electronically Signed: Baudilio Hastings MD  2/26/2024 7:41 PM EST  Workstation ID: [id number]          Assessment & Plan   Patient is a 59-year-old male with a known history of extensive stage small cell lung cancer that presented with voice changes and dyspnea and was found to have significant progression in the left upper lobe mass.    Extensive stage small cell lung  cancer  Received 4 cycles of chemoimmunotherapy with carboplatin, etoposide and atezolizumab, completed 1/4/2024  Currently on maintenance atezolizumab, last treatment 2/22/2024  Started brain radiation on 2/6/2024 due to interval enlargement in the right occipital lobe lesion  Now with significant progression in the left upper lobe causing near complete obstruction, pulmonology following and radiation oncology consulted  Agree with palliative XRT to the left upper lobe mass  Plan for treatment change post XRT.  Options include lurbinectedin, Taxotere, topotecan.    Brain mets  WBRT completed    Anemia  Hemoglobin stable at 10.1    Further recommendations per Dr. Rubio  Electronically signed by DEIDRE Maier, 02/27/24, 12:00 PM EST.    Patient seen and examined agree with assessment plan    Patient is a 59-year-old male with known history of extensive stage small cell lung cancer who presents with hoarseness of voice, shortness of breath and CT imaging concerning for disease progression.  I had a long conversation with the patient and his family members.  Discussed progression of disease discontinuation of immunotherapy.  I have also discussed with Dr. Fink who will have the patient in tomorrow for CT simulation and plan for palliative radiation given his symptomatology.  His hoarseness of voice has improved.  He had MRI brain done results are pending.  Will follow-up in clinic plan on starting lurbinectedin after radiation is completed side effects discussed plan on starting treatment outpatient    Thank you for this consult. We will be happy to follow along with you.   I have obtained complete history and perform physical exam along with review of labs, imaging.  I have completed the majority and substantive portion of medical decision making    Jeanette Rubio MD

## 2024-02-27 NOTE — CONSULTS
Group: [facility]         CONSULT NOTE    Patient Identification:  Romero Maciel  59 y.o.  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 Negative for wheezing and stridor.    Cardiovascular:  Negative for chest pain, palpitations and leg swelling.       Past Medical History:  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       Past Surgical History:  Past Surgical History:   Procedure Laterality Date    BRONCHOSCOPY N/A 10/22/2023    Procedure: BRONCHOSCOPY WITH CRYO BIOPSY X1 AREA,  FINE NEEDLE ASPIRATION, AND BRONCHOALVEOLAR LAVAGE;  Surgeon: Genia Jenkins MD;  Location: [facility] ENDOSCOPY;  Service: Pulmonary;  Laterality: N/A;  POST: LUNG MASS        Home Meds:  (Not in a hospital  "admission)      Allergies:  Allergies   Allergen Reactions    Penicillins Anaphylaxis       Social History:   Social History     Socioeconomic History    Marital status: Single   Tobacco Use    Smoking status: Former     Packs/day: 1.00     Years: 15.00     Additional pack years: 0.00     Total pack years: 15.00     Types: Cigarettes     Quit date: 10/2023     Years since quittin.4     Passive exposure: Current    Smokeless tobacco: Current   Vaping Use    Vaping Use: Never used   Substance and Sexual Activity    Alcohol use: Never    Drug use: Never    Sexual activity: Defer       Family History:  Family History   Problem Relation Age of Onset    Lung cancer Mother          at age 58    Lung cancer Brother          at age 40       Physical Exam:  /69   Pulse 85   Temp 98.4 °F (36.9 °C) (Oral)   Resp 14   Ht 180.3 cm (71\")   Wt 61.2 kg (135 lb)   SpO2 96%   BMI 18.83 kg/m²  Body mass index is 18.83 kg/m². 96% 61.2 kg (135 lb)  Physical Exam  Cardiovascular:      Heart sounds: Murmur heard.   Pulmonary:      Effort: No respiratory distress.      Breath sounds: No stridor. Rhonchi and rales present. No wheezing.   Chest:      Chest wall: No tenderness.         LABS:  Lab Results   Component Value Date    CALCIUM 9.4 2024     Results from last 7 days   Lab Units 24  0410 24  1105   SODIUM mmol/L 137 138 138   POTASSIUM mmol/L 3.4* 3.8 3.5   CHLORIDE mmol/L 98 99 100   CO2 mmol/L 30.0* 27.0 25.0   BUN mg/dL 5* 6* 8   CREATININE mg/dL 0.98 0.94 1.04   GLUCOSE mg/dL 110* 117* 142*   CALCIUM mg/dL 9.4 9.6 9.4   WBC 10*3/mm3 4.70 5.30 5.37   HEMOGLOBIN g/dL 10.1* 10.9* 10.3*   PLATELETS 10*3/mm3 179 179 196   ALT (SGPT) U/L  --  10 10   AST (SGOT) U/L  --  18 15   PROBNP pg/mL  --  60.3  --      Lab Results   Component Value Date    TROPONINT 23 (H) 2024     Results from last 7 days   Lab Units 24   HSTROP T ng/L 23* 23*       "   Results from last 7 days   Lab Units 02/26/24  2332 02/26/24  2325   LACTATE mmol/L 0.9 1.1         Results from last 7 days   Lab Units 02/26/24  1953   ADENOVIRUS DETECTION BY PCR  Not Detected   CORONAVIRUS 229E  Not Detected   CORONAVIRUS HKU1  Not Detected   CORONAVIRUS NL63  Not Detected   CORONAVIRUS OC43  Not Detected   HUMAN METAPNEUMOVIRUS  Not Detected   HUMAN RHINOVIRUS/ENTEROVIRUS  Not Detected   INFLUENZA B PCR  Not Detected   PARAINFLUENZA 1  Not Detected   PARAINFLUENZA VIRUS 2  Not Detected   PARAINFLUENZA VIRUS 3  Not Detected   PARAINFLUENZA VIRUS 4  Not Detected   BORDETELLA PERTUSSIS PCR  Not Detected   CHLAMYDOPHILA PNEUMONIAE PCR  Not Detected   MYCOPLAMA PNEUMO PCR  Not Detected   INFLUENZA A PCR  Not Detected   RSV, PCR  Not Detected             Lab Results   Component Value Date    TSH 0.129 (L) 01/23/2024     Estimated Creatinine Clearance: 69.4 mL/min (by C-G formula based on SCr of 0.98 mg/dL).         Imaging:  Imaging Results (Last 24 Hours)       Procedure Component Value Units Date/Time    CT Angiogram Chest Pulmonary Embolism [[id number]] Collected: 02/26/24 2234     Updated: 02/26/24 2243    Narrative:      CT ANGIOGRAM CHEST PULMONARY EMBOLISM    Date of Exam: 2/26/2024 10:05 PM EST    Indication: Pulmonary embolism (PE) suspected, positive D-dimer.    Comparison: CT chest 1/15/2024    Technique: Axial CT images were obtained of the chest after the uneventful intravenous administration of iodinated contrast utilizing pulmonary embolism protocol.  Sagittal and coronal reconstructions were performed.  Automated exposure control and   iterative reconstruction methods were used.      Findings:  PULMONARY VASCULATURE: There is constrictive occlusion of the distal right main pulmonary artery by obstructive right lung/invasive mediastinal mass. The left pulmonary arteries appear to be widely patent although there is respiratory motion artifact   which limits visualization of the lower  lobe subsegmental pulmonary arteries. Main pulmonary artery is normal in size. No evidence of right heart strain.    MEDIASTINUM: There is an enlarging medial right upper lobe lung mass invading the mediastinum with complete constriction of the right mainstem bronchus. This mass now measures 9.1 cm in transverse dimension by 7.1 cm in AP dimension, previously 5.5 x 3.1   cm. There is constriction of the left upper lobe pulmonary vein. Aortic root remains dilated measuring up to 4.1 cm in diameter. There is a focal right anterior pericardial effusion measuring up to 10 mm in thickness.  CORONARY ARTERIES: There is calcified atherosclerotic disease.  LUNGS: There are irregular opacities within the right upper lung likely areas of developing postobstructive pneumonia without dense consolidation. Cavitary lesion within the posterior right upper lobe is similar. There is worsening right middle lobe   atelectasis. There are new tree-in-bud nodules within the right lower lobe, suggestive of infection as well. Left lung is relatively clear.  PLEURAL SPACE: No effusion, mass, nor pneumothorax.  LYMPH NODES: There are no pathologically enlarged lymph nodes.    UPPER ABDOMEN: Unremarkable    OSSEOUS STRUCTURES: Appropriate for age with no acute process identified.      Impression:      Impression:  1.No evidence of pulmonary embolism.  2.Significant enlargement of the left upper lobe pulmonary mass now invading the mediastinum with complete constriction of the right main pulmonary artery, right main stem bronchus, and right upper lobe pulmonary vein. There are new postobstructive   inflammatory/infectious changes involving the right upper lobe, right middle lobe, and right lower lobe. Correlate for signs of pneumonia.  3.Other stable findings.        Electronically Signed: Corky Abrams MD    2/26/2024 10:41 PM EST    Workstation ID: [id number]    XR Chest 1 View [[id number]] Collected: 02/26/24 1939     Updated: 02/26/24 1943     Narrative:      XR CHEST 1 VW    Date of Exam: 2/26/2024 7:25 PM EST    Indication: cp    Comparison: 1/15/2024 CT and 10/22/2023 radiograph    Findings:  Right chest wall port with distal lead tip overlying the superior vena cava. Unchanged cardiomediastinal silhouette. There are densities overlying the right mid and upper lung which correlate with findings seen on chest CT dated 1/15/2024. There is   possible new airspace opacity in the right lung base. No pleural effusion or pneumothorax. No acute osseous abnormality.      Impression:      Impression:  Chronic changes of the right lung with possible new airspace opacity in the right lung base. This could represent superimposed infection.      Electronically Signed: Baudilio Hastings MD    2/26/2024 7:41 PM EST    Workstation ID: [id number]              Current Meds:   SCHEDULE  budesonide, 0.5 mg, Nebulization, BID - RT  [START ON 2/28/2024] levoFLOXacin, 750 mg, Intravenous, Q24H  potassium chloride, 40 mEq, Oral, Q4H  sodium chloride, 10 mL, Intravenous, Q12H      Infusions  lactated ringers, 50 mL/hr, Last Rate: 50 mL/hr (02/27/24 0635)      PRNs    albuterol    Calcium Replacement - Follow Nurse / BPA Driven Protocol    LORazepam    Magnesium Standard Dose Replacement - Follow Nurse / BPA Driven Protocol    nitroglycerin    ondansetron ODT **OR** ondansetron    Phosphorus Replacement - Follow Nurse / BPA Driven Protocol    Potassium Replacement - Follow Nurse / BPA Driven Protocol    [COMPLETED] Insert Peripheral IV **AND** sodium chloride    sodium chloride    sodium chloride        Stephanie Torres MD  2/27/2024  07:49 EST      Much of this encounter note is an electronic transcription/translation of spoken language to printed text using Dragon Software.

## 2024-02-27 NOTE — THERAPY EVALUATION
Patient Name: Romero Maciel  : 1965    MRN: [id number]                              Today's Date: 2024       Admit Date: 2024    Visit Dx:     ICD-10-CM ICD-9-CM   1. Pneumonia due to infectious organism, unspecified laterality, unspecified part of lung  J18.9 486   2. Lung mass  R91.8 786.6     Patient Active Problem List   Diagnosis    Lung mass    Cervical radiculopathy    Lumbar radiculopathy    Episodic paroxysmal anxiety disorder    Metastasis to brain    Small cell lung cancer    Chemotherapy induced neutropenia    Encounter for central line care    Dyspnea     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     Past Surgical History:   Procedure Laterality Date    BRONCHOSCOPY N/A 10/22/2023    Procedure: BRONCHOSCOPY WITH CRYO BIOPSY X1 AREA,  FINE NEEDLE ASPIRATION, AND BRONCHOALVEOLAR LAVAGE;  Surgeon: Genia Jenkins MD;  Location: [facility] ENDOSCOPY;  Service: Pulmonary;  Laterality: N/A;  POST: LUNG MASS      General Information       Row Name 24 1358          Physical Therapy Time and Intention    Document Type evaluation  -AD     Mode of Treatment physical therapy  -AD       Row Name 24 1358          General Information    Patient Profile Reviewed yes  -AD     Prior Level of Function min assist:;bathing;independent:;dressing  walks around the house short distances with brother or sister with him  -AD     Existing Precautions/Restrictions fall  -AD     Barriers to Rehab medically complex;previous functional deficit  -AD       Row Name 24 1358          Living Environment    People in Home sibling(s)  -AD       Row Name 24 1358          Home Main Entrance    Number of Stairs, Main Entrance none  -AD       Row Name 24 1358          Stairs Within Home, Primary    Number of Stairs, Within Home, Primary one  -AD       Row Name 24 1358          Safety Issues,  Functional Mobility    Impairments Affecting Function (Mobility) balance;endurance/activity tolerance;strength  -AD               User Key  (r) = Recorded By, (t) = Taken By, (c) = Cosigned By      Initials Name Provider Type    Angelique Brian PT Physical Therapist                   Mobility       Row Name 02/27/24 1405          Bed Mobility    Bed Mobility bed mobility (all) activities  -AD     All Activities, [place] (Bed Mobility) supervision  -AD       Row Name 02/27/24 1405          Sit-Stand Transfer    Sit-Stand [place] (Transfers) contact guard  -AD       Row Name 02/27/24 1405          Gait/Stairs (Locomotion)    [place] Level (Gait) contact guard  -AD     Patient was able to Ambulate yes  -AD     Distance in Feet (Gait) 3 ft  -AD               User Key  (r) = Recorded By, (t) = Taken By, (c) = Cosigned By      Initials Name Provider Type    Angelique Brian PT Physical Therapist                   Obj/Interventions       Row Name 02/27/24 1406          Range of Motion Comprehensive    Comment, General Range of Motion 25% dec BLEs  -AD       Row Name 02/27/24 1406          Strength Comprehensive (MMT)    Comment, General Manual Muscle Testing (MMT) Assessment 4-/5 BLEs no pain  -AD       Row Name 02/27/24 1406          Balance    Balance Assessment sitting static balance  -AD     Static Sitting Balance contact guard  -AD               User Key  (r) = Recorded By, (t) = Taken By, (c) = Cosigned By      Initials Name Provider Type    Angelique Brian PT Physical Therapist                   Goals/Plan       Row Name 02/27/24 1408          Bed Mobility Goal 1 (PT)    Activity/Assistive Device (Bed Mobility Goal 1, PT) bed mobility activities, all  -AD     [place] Level/Cues Needed (Bed Mobility Goal 1, PT) independent  -AD     Time Frame (Bed Mobility Goal 1, PT) long term goal (LTG)  -AD       Row Name 02/27/24 1408          Transfer Goal 1 (PT)    Activity/Assistive Device  (Transfer Goal 1, PT) transfers, all  -AD     [place] Level/Cues Needed (Transfer Goal 1, PT) independent  -AD     Time Frame (Transfer Goal 1, PT) long term goal (LTG)  -AD       Row Name 02/27/24 1408          Gait Training Goal 1 (PT)    Activity/Assistive Device (Gait Training Goal 1, PT) gait (walking locomotion);assistive device use  -AD     [place] Level (Gait Training Goal 1, PT) contact guard required  -AD     Distance (Gait Training Goal 1, PT) 30  -AD     Time Frame (Gait Training Goal 1, PT) long term goal (LTG)  -AD       Row Name 02/27/24 1405          Therapy Assessment/Plan (PT)    Planned Therapy Interventions (PT) balance training;bed mobility training;gait training;postural re-education;patient/family education;neuromuscular re-education;ROM (range of motion);strengthening;stretching;transfer training  -AD               User Key  (r) = Recorded By, (t) = Taken By, (c) = Cosigned By      Initials Name Provider Type    AD Angelique Chang, PT Physical Therapist                   Clinical Impression       Row Name 02/27/24 1406          Pain    Pretreatment Pain Rating 3/10  -AD     Posttreatment Pain Rating 3/10  -AD     Pain Location generalized  -AD     Pain Intervention(s) Repositioned  -AD       Row Name 02/27/24 1406          Plan of Care Review    Plan of Care Reviewed With patient  -AD     Progress no change  -AD     Outcome Evaluation 59 y.o. male with PMHx of small cell lung cancer with mets to brain presented to [facility] for dyspnea complicated by loss of voice.  Patient states this afternoon he became dyspneic, was  unable to cough, and lost his voice with slight chest discomfort.  . PLOF is (I) with dressing, Alexey for bathing for setup from family, and Alexey for ambulation around home for short distances. His older brother or younger sister are with him 24/7 at home. Today he required CGA for bed mobility and transfers, and CGA for 2 ft ambulation to reposition in bed. Recommend  home with HHPT for endurance training at discharge.  -AD       Row Name 02/27/24 1406          Therapy Assessment/Plan (PT)    Patient/Family Therapy Goals Statement (PT) go home  -AD     Rehab Potential (PT) good, to achieve stated therapy goals  -AD     Criteria for Skilled Interventions Met (PT) yes;skilled treatment is necessary  -AD     Therapy Frequency (PT) 3 times/wk  -AD               User Key  (r) = Recorded By, (t) = Taken By, (c) = Cosigned By      Initials Name Provider Type    Angelique Brian PT Physical Therapist                   Outcome Measures       Row Name 02/27/24 1408          How much help from another person do you currently need...    Turning from your back to your side while in flat bed without using bedrails? 3  -AD     Moving from lying on back to sitting on the side of a flat bed without bedrails? 3  -AD     Moving to and from a bed to a chair (including a wheelchair)? 3  -AD     Standing up from a chair using your arms (e.g., wheelchair, bedside chair)? 3  -AD     Climbing 3-5 steps with a railing? 2  -AD     To walk in hospital room? 2  -AD     AM-PAC 6 Clicks Score (PT) 16  -AD     Highest Level of Mobility Goal 5 --> Static standing  -AD       Row Name 02/27/24 1408          Functional Assessment    Outcome Measure Options AM-PAC 6 Clicks Basic Mobility (PT)  -AD               User Key  (r) = Recorded By, (t) = Taken By, (c) = Cosigned By      Initials Name Provider Type    Angelique Brian PT Physical Therapist                                 Physical Therapy Education       Title: PT OT SLP Therapies (In Progress)       Topic: Physical Therapy (In Progress)       Point: Mobility training (Done)       Learning Progress Summary             Patient Acceptance, E, VU by AD at 2/27/2024 1409                         Point: Home exercise program (Not Started)       Learner Progress:  Not documented in this visit.              Point: Body mechanics (Not Started)       Learner  Progress:  Not documented in this visit.              Point: Precautions (Not Started)       Learner Progress:  Not documented in this visit.                              User Key       Initials Effective Dates Name Provider Type Discipline    AD 07/11/23 -  Angelique Chang, PT Physical Therapist PT                  PT Recommendation and Plan  Planned Therapy Interventions (PT): balance training, bed mobility training, gait training, postural re-education, patient/family education, neuromuscular re-education, ROM (range of motion), strengthening, stretching, transfer training  Plan of Care Reviewed With: patient  Progress: no change  Outcome Evaluation: 59 y.o. male with PMHx of small cell lung cancer with mets to brain presented to [facility] for dyspnea complicated by loss of voice.  Patient states this afternoon he became dyspneic, was  unable to cough, and lost his voice with slight chest discomfort.  . PLOF is (I) with dressing, Alexey for bathing for setup from family, and Alexey for ambulation around home for short distances. His older brother or younger sister are with him 24/7 at home. Today he required CGA for bed mobility and transfers, and CGA for 2 ft ambulation to reposition in bed. Recommend home with HHPT for endurance training at discharge.     Time Calculation:   PT Evaluation Complexity  History, PT Evaluation Complexity: 1-2 personal factors and/or comorbidities  Examination of Body Systems (PT Eval Complexity): total of 3 or more elements  Clinical Presentation (PT Evaluation Complexity): evolving  Clinical Decision Making (PT Evaluation Complexity): moderate complexity  Overall Complexity (PT Evaluation Complexity): moderate complexity     PT Charges       Row Name 02/27/24 1355             Time Calculation    Start Time 1021  -AD      Stop Time 1034  -AD      Time Calculation (min) 13 min  -AD      PT Received On 02/27/24  -AD      PT - Next Appointment 02/28/24  -AD      PT Goal Re-Cert Due Date  03/12/24  -AD         Time Calculation- PT    Total Timed Code Minutes- PT 0 minute(s)  -AD                User Key  (r) = Recorded By, (t) = Taken By, (c) = Cosigned By      Initials Name Provider Type    AD Angelique Chang, PT Physical Therapist                  Therapy Charges for Today       Code Description Service Date Service Provider Modifiers Qty    84081230281 HC PT EVAL MOD COMPLEXITY 4 2/27/2024 Angelique Chang, EMI GP 1            PT G-Codes  Outcome Measure Options: AM-PAC 6 Clicks Basic Mobility (PT)  AM-PAC 6 Clicks Score (PT): 16  PT Discharge Summary  Anticipated Discharge Disposition (PT): home with home health    Angelique Chang, PT  2/27/2024

## 2024-02-27 NOTE — H&P
[facility] Medicine Services    Hospitalist History and Physical     Romero Maciel : 1965 MRN:[id number] LOS:0 ROOM:      Chief Complaint: Multiple complaints    Assessment / Plan     #Lung mass with mets to brain  #Postobstructive Pneumonia  #Voice Change    - surgical pathology on 10/22/23 shows small cell lung cancer    - CT PE without PE but does shows enlargement of the left upper lobe mass now invading the mediastinum with complete constriction of the right main pulmonary artery, right main stem bronchus, and right upper lobe pulmonary vein with new post-obstructive inflammatory/infectious changes involving the right side    - Levofloxacin 750mg IV daily for pneumonia, monitor for toxicity    - strep, legionella, sputum    - MRSA swab, if positive consider starting Vancomycin    - blood cultures    - NPO    - Pulm consulted to consider bronchoscopy    - Patient states he finished 4 rounds of chemotherapy, finsihed radiation last month, and currently on immunotherapy    - heme/onc consulted to assess    - CT head to rule out hemorrhage    - MRI brain to assess mets     - Ativan 1mg PO q6h PRN anxiety for imaging, has tolerated in the past    - Currently stable on room air    - RVP negative    #Anemia    - hgb stable at 10.9, no overt bleeding, follow labs    Code Status (Patient has no pulse and is not breathing): CPR (Attempt to Resuscitate)  Medical Interventions (Patient has pulse or is breathing): Full Support         DVT prophylaxis: [facility]  Mechanical DVT prophylaxis orders are present.         History of Present illness     Romero Maciel is a 59 y.o. 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  Patient states this afternoon he became dyspneic, was  unable to cough, and lost his voice with slight chest discomfort.  Denies numbness, tingling ,face droop, slurred speech, fevers, chills.  States he follows with   Joanne and did 4 rounds of chemotherapy, currently on immunotherapy, and just did radiation due to small cell lung cancer with mets to brain.  Presented to [facility] for assessment.      Patient was seen and examined on 24 at 01:33 EST .    Subjective / Review of systems     Review of Systems   12 point ROS reviewed and negative except as mentioned above      Past Medical/Surgical/Social/Family History & Allergies     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      Past Surgical History:   Procedure Laterality Date    BRONCHOSCOPY N/A 10/22/2023    Procedure: BRONCHOSCOPY WITH CRYO BIOPSY X1 AREA,  FINE NEEDLE ASPIRATION, AND BRONCHOALVEOLAR LAVAGE;  Surgeon: Genia Jenkins MD;  Location: [facility] ENDOSCOPY;  Service: Pulmonary;  Laterality: N/A;  POST: LUNG MASS      Social History     Socioeconomic History    Marital status: Single   Tobacco Use    Smoking status: Former     Packs/day: 1.00     Years: 15.00     Additional pack years: 0.00     Total pack years: 15.00     Types: Cigarettes     Quit date: 10/2023     Years since quittin.4     Passive exposure: Current    Smokeless tobacco: Current   Vaping Use    Vaping Use: Never used   Substance and Sexual Activity    Alcohol use: Never    Drug use: Never    Sexual activity: Defer      Family History   Problem Relation Age of Onset    Lung cancer Mother          at age 58    Lung cancer Brother          at age 40      Allergies   Allergen Reactions    Penicillins Anaphylaxis      Social Determinants of Health     Tobacco Use: High Risk (2024)    Patient History     Smoking Tobacco Use: Former     Smokeless Tobacco Use: Current     Passive Exposure: Current   Alcohol Use: Not At Risk (10/21/2023)    AUDIT-C     Frequency of Alcohol Consumption: Never     Average Number of Drinks: Patient does not drink     Frequency of Binge Drinking: Never   Financial  Resource Strain: Not on file   Food Insecurity: No Food Insecurity (10/23/2023)    Hunger Vital Sign     Worried About Running Out of Food in the Last Year: Never true     Ran Out of Food in the Last Year: Never true   Transportation Needs: Not on file   Physical Activity: Not on file   Stress: Not on file   Social Connections: Unknown (10/12/2023)    Family and Community Support     Help with Day-to-Day Activities: Not on file     Lonely or Isolated: Not on file   Interpersonal Safety: Not At Risk (2/26/2024)    Abuse Screen     Unsafe at Home or Work/School: no     Feels Threatened by Someone?: no     Does Anyone Keep You from Contacting Others or Doint Things Outside the Home?: no     Physical Sign of Abuse Present: no   Depression: Not at risk (10/30/2023)    PHQ-2     PHQ-2 Score: 0   Housing Stability: Unknown (10/23/2023)    Housing Stability     Current Living Arrangements: home     Potentially Unsafe Housing Conditions: Not on file   Utilities: Not At Risk (10/23/2023)    [facility] Utilities     Threatened with loss of utilities: No   Health Literacy: Unknown (10/23/2023)    Education     Help with school or training?: Not on file     Preferred Language: English   Employment: Unknown (10/12/2023)    Employment     Do you want help finding or keeping work or a job?: Not on file   Disabilities: At Risk (10/21/2023)    Disabilities     Concentrating, Remembering, or Making Decisions Difficulty: no     Doing Errands Independently Difficulty: yes        Home Medications     Prior to Admission medications    Medication Sig Start Date End Date Taking? Authorizing Provider   albuterol sulfate  (90 Base) MCG/ACT inhaler Inhale 2 puffs Every 4 (Four) Hours As Needed for Wheezing. 1/23/24   Jeanette Rubio MD   budesonide (PULMICORT) 0.5 MG/2ML nebulizer solution Take 2 mL by nebulization 2 (Two) Times a Day. 10/23/23   Olamide Dugan MD   clonazePAM (KlonoPIN) 1 MG tablet Take 1 tablet by mouth 2 (Two) Times a Day  As Needed.    Juan C Franco MD   doxepin (SINEquan) 75 MG capsule Take 1 capsule by mouth Every Night.    Juan C Franco MD   guaiFENesin (MUCINEX) 600 MG 12 hr tablet Take 2 tablets by mouth 2 (Two) Times a Day.  Patient not taking: Reported on 10/25/2023    Juan C Franco MD   HYDROcodone-acetaminophen (NORCO) 5-325 MG per tablet Take 1 tablet by mouth Every 6 (Six) Hours As Needed for Moderate Pain. 2/26/24   Jeanette Rubio MD   ipratropium-albuterol (DUO-NEB) 0.5-2.5 mg/3 ml nebulizer Take 3 mL by nebulization Every 4 (Four) Hours As Needed for Wheezing. 10/23/23   Olamide Dugan MD   lidocaine-prilocaine (EMLA) 2.5-2.5 % cream Apply 1 application  topically to the appropriate area as directed See Admin Instructions. Apply to port site one hour prior to port being accessed. 12/4/23   Jeanette Rubio MD   LORazepam (Ativan) 1 MG tablet Take 1 tablet by mouth Daily. On MRI and Radiation days 1/30/24   Ty Goodrich MD   memantine (Namenda Titration Jose J) 28 x 5 MG & 21 x 10 MG tablet pack Follow package directions. 1/31/24   Ty Goodrich MD   memantine (NAMENDA) 10 MG tablet Take 1 tablet by mouth 2 (Two) Times a Day. 2/13/24   Ty Goodrich MD   mirtazapine (REMERON) 45 MG tablet Take 1 tablet by mouth Every Night.    ProviderJuan C MD   Nirmatrelvir & Ritonavir, 300mg/100mg, (PAXLOVID) 20 x 150 MG & 10 x 100MG tablet therapy pack tablet Take 3 tablets by mouth 2 (Two) Times a Day. Take as directed twice daily x 5 days  Patient not taking: Reported on 2/13/2024 1/24/24   Jeanette Rubio MD   OLANZapine (zyPREXA) 5 MG tablet Take 1 tablet by mouth Every Night. Take nightly x 4 starting night of chemotherapy.  Patient not taking: Reported on 2/13/2024 1/22/24   Jeanette Rubio MD   ondansetron (ZOFRAN) 8 MG tablet Take 1 tablet by mouth 3 (Three) Times a Day As Needed for Nausea or Vomiting. 10/31/23   Jeanette Rubio MD        Objective / Physical Exam     Vital signs:  Temp: 98.4 °F (36.9  °C)  BP: 111/71  Heart Rate: 83  Resp: 16  SpO2: 98 %  Weight: 61.2 kg (135 lb)    Admission Weight: Weight: 61.2 kg (135 lb)    Physical Exam  Constitutional:       General: He is not in acute distress.     Appearance: Normal appearance. He is not toxic-appearing.   HENT:      Head: Normocephalic and atraumatic.      Nose: Nose normal. No congestion.      Mouth/Throat:      Pharynx: Oropharynx is clear. No oropharyngeal exudate.   Eyes:      General: No scleral icterus.  Cardiovascular:      Rate and Rhythm: Normal rate and regular rhythm.      Heart sounds: No murmur heard.     No friction rub. No gallop.   Pulmonary:      Effort: No respiratory distress.      Breath sounds: Rales present. No wheezing.      Comments: Stable on room air  Abdominal:      General: There is no distension.      Tenderness: There is no abdominal tenderness. There is no guarding.   Musculoskeletal:         General: No swelling, deformity or signs of injury.      Cervical back: Normal range of motion. No rigidity.   Skin:     Coloration: Skin is not jaundiced.      Findings: No bruising or lesion.   Neurological:      General: No focal deficit present.      Mental Status: He is alert and oriented to person, place, and time.      Motor: Weakness present.          Labs     Results from last 7 days   Lab Units 02/26/24 1953 02/22/24  1105   WBC 10*3/mm3 5.30 5.37   HEMOGLOBIN g/dL 10.9* 10.3*   HEMATOCRIT % 33.5* 32.4*   PLATELETS 10*3/mm3 179 196      Results from last 7 days   Lab Units 02/26/24 1953 02/22/24  1105   ALK PHOS U/L 61 58   AST (SGOT) U/L 18 15   ALT (SGPT) U/L 10 10           Results from last 7 days   Lab Units 02/26/24 1953 02/22/24  1105   SODIUM mmol/L 138 138   POTASSIUM mmol/L 3.8 3.5   CHLORIDE mmol/L 99 100   CO2 mmol/L 27.0 25.0   BUN mg/dL 6* 8   CREATININE mg/dL 0.94 1.04   GLUCOSE mg/dL 117* 142*        Imaging     CT Angiogram Chest Pulmonary Embolism    Result Date: 2/26/2024  CT ANGIOGRAM CHEST PULMONARY  EMBOLISM Date of Exam: 2/26/2024 10:05 PM EST Indication: Pulmonary embolism (PE) suspected, positive D-dimer. Comparison: CT chest 1/15/2024 Technique: Axial CT images were obtained of the chest after the uneventful intravenous administration of iodinated contrast utilizing pulmonary embolism protocol.  Sagittal and coronal reconstructions were performed.  Automated exposure control and iterative reconstruction methods were used. Findings: PULMONARY VASCULATURE: There is constrictive occlusion of the distal right main pulmonary artery by obstructive right lung/invasive mediastinal mass. The left pulmonary arteries appear to be widely patent although there is respiratory motion artifact which limits visualization of the lower lobe subsegmental pulmonary arteries. Main pulmonary artery is normal in size. No evidence of right heart strain. MEDIASTINUM: There is an enlarging medial right upper lobe lung mass invading the mediastinum with complete constriction of the right mainstem bronchus. This mass now measures 9.1 cm in transverse dimension by 7.1 cm in AP dimension, previously 5.5 x 3.1  cm. There is constriction of the left upper lobe pulmonary vein. Aortic root remains dilated measuring up to 4.1 cm in diameter. There is a focal right anterior pericardial effusion measuring up to 10 mm in thickness. CORONARY ARTERIES: There is calcified atherosclerotic disease. LUNGS: There are irregular opacities within the right upper lung likely areas of developing postobstructive pneumonia without dense consolidation. Cavitary lesion within the posterior right upper lobe is similar. There is worsening right middle lobe atelectasis. There are new tree-in-bud nodules within the right lower lobe, suggestive of infection as well. Left lung is relatively clear. PLEURAL SPACE: No effusion, mass, nor pneumothorax. LYMPH NODES: There are no pathologically enlarged lymph nodes. UPPER ABDOMEN: Unremarkable OSSEOUS STRUCTURES:  Appropriate for age with no acute process identified.     Impression: 1.No evidence of pulmonary embolism. 2.Significant enlargement of the left upper lobe pulmonary mass now invading the mediastinum with complete constriction of the right main pulmonary artery, right main stem bronchus, and right upper lobe pulmonary vein. There are new postobstructive inflammatory/infectious changes involving the right upper lobe, right middle lobe, and right lower lobe. Correlate for signs of pneumonia. 3.Other stable findings. Electronically Signed: Corky Abrams MD  2/26/2024 10:41 PM EST  Workstation ID: [id number]    XR Chest 1 View    Result Date: 2/26/2024  XR CHEST 1 VW Date of Exam: 2/26/2024 7:25 PM EST Indication: cp Comparison: 1/15/2024 CT and 10/22/2023 radiograph Findings: Right chest wall port with distal lead tip overlying the superior vena cava. Unchanged cardiomediastinal silhouette. There are densities overlying the right mid and upper lung which correlate with findings seen on chest CT dated 1/15/2024. There is possible new airspace opacity in the right lung base. No pleural effusion or pneumothorax. No acute osseous abnormality.     Impression: Chronic changes of the right lung with possible new airspace opacity in the right lung base. This could represent superimposed infection. Electronically Signed: Baudilio Hastings MD  2/26/2024 7:41 PM EST  Workstation ID: [id number]        Current Medications     Scheduled Meds:  budesonide, 0.5 mg, Nebulization, BID - RT  levoFLOXacin, 750 mg, Intravenous, Once  levoFLOXacin, 750 mg, Intravenous, Q24H  sodium chloride, 10 mL, Intravenous, Q12H         Continuous Infusions:  lactated ringers, 50 mL/hr           Mele Fuentes [facility]  02/27/24   01:33 EST      no

## 2024-02-27 NOTE — THERAPY EVALUATION
Patient Name: Romero Maciel  : 1965    MRN: [id number]                              Today's Date: 2024       Admit Date: 2024    Visit Dx:     ICD-10-CM ICD-9-CM   1. Pneumonia due to infectious organism, unspecified laterality, unspecified part of lung  J18.9 486   2. Lung mass  R91.8 786.6     Patient Active Problem List   Diagnosis    Lung mass    Cervical radiculopathy    Lumbar radiculopathy    Episodic paroxysmal anxiety disorder    Metastasis to brain    Small cell lung cancer    Chemotherapy induced neutropenia    Encounter for central line care    Dyspnea     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     Past Surgical History:   Procedure Laterality Date    BRONCHOSCOPY N/A 10/22/2023    Procedure: BRONCHOSCOPY WITH CRYO BIOPSY X1 AREA,  FINE NEEDLE ASPIRATION, AND BRONCHOALVEOLAR LAVAGE;  Surgeon: Genia Jenkins MD;  Location: [facility] ENDOSCOPY;  Service: Pulmonary;  Laterality: N/A;  POST: LUNG MASS      General Information       Row Name 24 1416          OT Time and Intention    Document Type evaluation  -SR     Mode of Treatment occupational therapy  -SR       Row Name 24 1416          Occupational Profile    Reason for Services/Referral (Occupational Profile) 59 y.o. male with PMHx of small cell lung cancer with mets to brain presented to [facility] for dyspnea and loss of voice.  Patient states this afternoon he became dyspneic, was  unable to cough, and lost his voice with slight chest discomfort.  . PLOF is (I) with dressing, Alexey for bathing for setup from family, and Alexey for ambulation around home for short distances.  -SR       Row Name 24 1416          Living Environment    People in Home sibling(s)  -SR       Row Name 24 1416          Safety Issues, Functional Mobility    Impairments Affecting Function (Mobility) balance;endurance/activity tolerance;strength   -SR               User Key  (r) = Recorded By, (t) = Taken By, (c) = Cosigned By      Initials Name Provider Type    SR Lisa Chaney OT Occupational Therapist                     Mobility/ADL's       Row Name 02/27/24 1417          Bed Mobility    Bed Mobility bed mobility (all) activities  -SR     All Activities, [place] (Bed Mobility) supervision  -SR       Row Name 02/27/24 1417          Sit-Stand Transfer    Sit-Stand [place] (Transfers) contact guard  -SR               User Key  (r) = Recorded By, (t) = Taken By, (c) = Cosigned By      Initials Name Provider Type    SR Lisa Chaney OT Occupational Therapist                   Obj/Interventions       Row Name 02/27/24 1420          Range of Motion Comprehensive    Comment, General Range of Motion Shoulders limited >50%.  Good distal UE  -SR       Row Name 02/27/24 1420          Strength Comprehensive (MMT)    Comment, General Manual Muscle Testing (MMT) Assessment Shoulders 3-/5, distal UE 3+/5  -SR       Row Name 02/27/24 1420          Balance    Balance Assessment standing static balance  -SR     Static Sitting Balance contact guard  -SR     Static Standing Balance contact guard  -SR     Balance Interventions sitting;standing;sit to stand;supported;static;dynamic;minimal challenge  -SR               User Key  (r) = Recorded By, (t) = Taken By, (c) = Cosigned By      Initials Name Provider Type    SR Lisa Chaney OT Occupational Therapist                   Goals/Plan       Row Name 02/27/24 1426          Bathing Goal 1 (OT)    Activity/Device (Bathing Goal 1, OT) bathing skills, all  -SR     [place] Level/Cues Needed (Bathing Goal 1, OT) minimum assist (75% or more patient effort)  -SR     Time Frame (Bathing Goal 1, OT) 2 weeks  -SR       Row Name 02/27/24 1426          Dressing Goal 1 (OT)    Activity/Device (Dressing Goal 1, OT) dressing skills, all  -SR     [place]/Cues Needed (Dressing Goal 1, OT)  supervision required  -SR     Time Frame (Dressing Goal 1, OT) 2 weeks  -SR       Row Name 02/27/24 1426          Therapy Assessment/Plan (OT)    Planned Therapy Interventions (OT) activity tolerance training;BADL retraining;functional balance retraining;IADL retraining;occupation/activity based interventions;transfer/mobility retraining  -SR               User Key  (r) = Recorded By, (t) = Taken By, (c) = Cosigned By      Initials Name Provider Type    SR Lisa Chaney, OT Occupational Therapist                   Clinical Impression       Row Name 02/27/24 1423          Pain Assessment    Pretreatment Pain Rating 3/10  -SR     Posttreatment Pain Rating 3/10  -SR     Pain Location generalized  -SR       Row Name 02/27/24 1423          Plan of Care Review    Progress no change  -SR     Outcome Evaluation 59 y.o. male with PMHx of small cell lung cancer with mets to brain presented to [facility] for dyspnea and loss of voice.  Patient states this afternoon he became dyspneic, was  unable to cough, and lost his voice with slight chest discomfort.  . PLOF is (I) with dressing, Alexey for bathing for setup from family, and Alexey for ambulation around home for short distances. This date pt reports not feeling well, though agrees to therapy. He tolerated bed mobility and standing at EOB well wiht CGA.  Recommend return home with 24 hour care and HHPT.  -SR       Row Name 02/27/24 1423          Therapy Assessment/Plan (OT)    Rehab Potential (OT) good, to achieve stated therapy goals  -SR     Criteria for Skilled Therapeutic Interventions Met (OT) yes  -SR     Therapy Frequency (OT) 3 times/wk  -SR     Predicted Duration of Therapy Intervention (OT) Until discharge  -SR       Row Name 02/27/24 1423          Therapy Plan Review/Discharge Plan (OT)    Anticipated Discharge Disposition (OT) home with home health  -SR       Row Name 02/27/24 1423          Positioning and Restraints    Pre-Treatment Position in bed  -SR     Post  Treatment Position bed  -SR     In Bed call light within reach;encouraged to call for assist;exit alarm on  -SR               User Key  (r) = Recorded By, (t) = Taken By, (c) = Cosigned By      Initials Name Provider Type    SR Lisa Chaney, OT Occupational Therapist                   Outcome Measures       Row Name 02/27/24 1428          How much help from another is currently needed...    Putting on and taking off regular lower body clothing? 3  -SR     Bathing (including washing, rinsing, and drying) 3  -SR     Toileting (which includes using toilet bed pan or urinal) 3  -SR     Putting on and taking off regular upper body clothing 3  -SR     Taking care of personal grooming (such as brushing teeth) 3  -SR     Eating meals 4  -SR     AM-PAC 6 Clicks Score (OT) 19  -SR       Row Name 02/27/24 1408          How much help from another person do you currently need...    Turning from your back to your side while in flat bed without using bedrails? 3  -AD     Moving from lying on back to sitting on the side of a flat bed without bedrails? 3  -AD     Moving to and from a bed to a chair (including a wheelchair)? 3  -AD     Standing up from a chair using your arms (e.g., wheelchair, bedside chair)? 3  -AD     Climbing 3-5 steps with a railing? 2  -AD     To walk in hospital room? 2  -AD     AM-PAC 6 Clicks Score (PT) 16  -AD     Highest Level of Mobility Goal 5 --> Static standing  -AD       Row Name 02/27/24 1428 02/27/24 1408       Functional Assessment    Outcome Measure Options AM-PAC 6 Clicks Daily Activity (OT)  -SR AM-PAC 6 Clicks Basic Mobility (PT)  -AD              User Key  (r) = Recorded By, (t) = Taken By, (c) = Cosigned By      Initials Name Provider Type    SR Lisa Chaney, OT Occupational Therapist    Angelique Brian PT Physical Therapist                      OT Recommendation and Plan  Planned Therapy Interventions (OT): activity tolerance training, BADL retraining, functional  balance retraining, IADL retraining, occupation/activity based interventions, transfer/mobility retraining  Therapy Frequency (OT): 3 times/wk  Plan of Care Review  Progress: no change  Outcome Evaluation: 59 y.o. male with PMHx of small cell lung cancer with mets to brain presented to [facility] for dyspnea and loss of voice.  Patient states this afternoon he became dyspneic, was  unable to cough, and lost his voice with slight chest discomfort.  . PLOF is (I) with dressing, Alexey for bathing for setup from family, and Alexey for ambulation around home for short distances. This date pt reports not feeling well, though agrees to therapy. He tolerated bed mobility and standing at EOB well wiht CGA.  Recommend return home with 24 hour care and HHPT.     Time Calculation:         Time Calculation- OT       Row Name 02/27/24 1428             Time Calculation- OT    OT Start Time 1020  -SR      OT Stop Time 1035  -SR      OT Time Calculation (min) 15 min  -SR      Total Timed Code Minutes- OT 0 minute(s)  -SR      OT Received On 02/27/24  -SR      OT - Next Appointment 02/28/24  -SR      OT Goal Re-Cert Due Date 03/12/24  -SR                User Key  (r) = Recorded By, (t) = Taken By, (c) = Cosigned By      Initials Name Provider Type    SR Lisa Chaney OT Occupational Therapist                  Therapy Charges for Today       Code Description Service Date Service Provider Modifiers Qty    [id number]  OT EVAL MOD COMPLEXITY 4 2/27/2024 Lisa Chaney OT GO 1                 Lisa Chaney OT  2/27/2024

## 2024-02-27 NOTE — PAYOR COMM NOTE
"This is a clinical update for Romero Maciel \"Pat\"   Reference/Auth # [id number]    INPATIENT AUTHORIZATION PENDING:     Please call or fax determination to contact below.   Thank you.    Danica Riley, RN, BSN  Utilization Review Nurse  [facility]  Direct & confidential phone # [phone]  Fax # [phone]    OBSERVATION TO INPATIENT CASE    02/27/24 0935  Inpatient Admission  Once        Level of Care: Telemetry  Diagnosis: Dyspnea [022495]  Admitting Physician: MELE LAIRD [[id number]]  Attending Physician: KELVIN CONTRERAS [[id number]]  Certification: I Certify That Inpatient Hospital Services Are Medically Necessary For Greater Than 2 Midnights          Romero Maciel \"Pat\" (59 y.o. Male)       Date of Birth   1965    Social Security Number       Address    [address] [facility] [place] [facility]    Home Phone   [phone]    MRN   [id number]       [facility]    Marital Status   Single                            Admission Date   2/26/24    Admission Type   Emergency    Admitting Provider   Mele Laird DO    Attending Provider   Kelvin Contreras MD    Department, Room/Bed   [facility] EMERGENCY DEPARTMENT, 14/14       Discharge Date       Discharge Disposition       Discharge Destination                                 Attending Provider: Kelvin Contreras MD    Allergies: Penicillins    Isolation: None   Infection: None   Code Status: CPR    Ht: 180.3 cm (71\")   Wt: 61.2 kg (135 lb)    Admission Cmt: None   Principal Problem: Dyspnea [R06.00]                   Active Insurance as of 2/26/2024       Primary Coverage       Payor Plan Insurance Group Employer/Plan Group    [facility] [employer] [facility] [employer] [facility] PPO [id number]       Payor Plan Address Payor Plan Phone Number Payor Plan Fax Number Effective Dates    [address] [phone]  1/10/2022 - None Entered    [facility] [id number]         Subscriber Name Subscriber Birth Date " Member ID       JAKE MACIEL 1965 [id number]                     Emergency Contacts        (Rel.) Home Phone Work Phone Mobile Phone    Suhas Maciel (Brother) -- -- [phone]    Vaishnavi Maciel (Sister) -- -- [phone]                 History & Physical        Mele Fuentes DO at 24 0133              [facility] Services    Hospitalist History and Physical     Jake Maciel : 1965 MRN:[id number] LOS:0 ROOM:      Chief Complaint: Multiple complaints    Assessment / Plan     #Lung mass with mets to brain  #Postobstructive Pneumonia  #Voice Change    - surgical pathology on 10/22/23 shows small cell lung cancer    - CT PE without PE but does shows enlargement of the left upper lobe mass now invading the mediastinum with complete constriction of the right main pulmonary artery, right main stem bronchus, and right upper lobe pulmonary vein with new post-obstructive inflammatory/infectious changes involving the right side    - Levofloxacin 750mg IV daily for pneumonia, monitor for toxicity    - strep, legionella, sputum    - MRSA swab, if positive consider starting Vancomycin    - blood cultures    - NPO    - Pulm consulted to consider bronchoscopy    - Patient states he finished 4 rounds of chemotherapy, finsihed radiation last month, and currently on immunotherapy    - heme/onc consulted to assess    - CT head to rule out hemorrhage    - MRI brain to assess mets     - Ativan 1mg PO q6h PRN anxiety for imaging, has tolerated in the past    - Currently stable on room air    - RVP negative    #Anemia    - hgb stable at 10.9, no overt bleeding, follow labs    Code Status (Patient has no pulse and is not breathing): CPR (Attempt to Resuscitate)  Medical Interventions (Patient has pulse or is breathing): Full Support         DVT prophylaxis: SCDs  Mechanical DVT prophylaxis orders are present.         History of Present illness     Jake Maciel is a 59 y.o. 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  Patient states this afternoon he became dyspneic, was  unable to cough, and lost his voice with slight chest discomfort.  Denies numbness, tingling ,face droop, slurred speech, fevers, chills.  States he follows with Dr. Rubio and did 4 rounds of chemotherapy, currently on immunotherapy, and just did radiation due to small cell lung cancer with mets to brain.  Presented to [facility] for assessment.      Patient was seen and examined on 24 at 01:33 EST .    Subjective / Review of systems     Review of Systems   12 point ROS reviewed and negative except as mentioned above      Past Medical/Surgical/Social/Family History & Allergies     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      Past Surgical History:   Procedure Laterality Date    BRONCHOSCOPY N/A 10/22/2023    Procedure: BRONCHOSCOPY WITH CRYO BIOPSY X1 AREA,  FINE NEEDLE ASPIRATION, AND BRONCHOALVEOLAR LAVAGE;  Surgeon: Genia Jenkins MD;  Location: [facility] ENDOSCOPY;  Service: Pulmonary;  Laterality: N/A;  POST: LUNG MASS      Social History     Socioeconomic History    Marital status: Single   Tobacco Use    Smoking status: Former     Packs/day: 1.00     Years: 15.00     Additional pack years: 0.00     Total pack years: 15.00     Types: Cigarettes     Quit date: 10/2023     Years since quittin.4     Passive exposure: Current    Smokeless tobacco: Current   Vaping Use    Vaping Use: Never used   Substance and Sexual Activity    Alcohol use: Never    Drug use: Never    Sexual activity: Defer      Family History   Problem Relation Age of Onset    Lung cancer Mother          at age 58    Lung cancer Brother          at age 40      Allergies   Allergen Reactions    Penicillins Anaphylaxis      Social Determinants of Health     Tobacco Use: High Risk  (1/23/2024)    Patient History     Smoking Tobacco Use: Former     Smokeless Tobacco Use: Current     Passive Exposure: Current   Alcohol Use: Not At Risk (10/21/2023)    AUDIT-C     Frequency of Alcohol Consumption: Never     Average Number of Drinks: Patient does not drink     Frequency of Binge Drinking: Never   Financial Resource Strain: Not on file   Food Insecurity: No Food Insecurity (10/23/2023)    Hunger Vital Sign     Worried About Running Out of Food in the Last Year: Never true     Ran Out of Food in the Last Year: Never true   Transportation Needs: Not on file   Physical Activity: Not on file   Stress: Not on file   Social Connections: Unknown (10/12/2023)    Family and Community Support     Help with Day-to-Day Activities: Not on file     Lonely or Isolated: Not on file   Interpersonal Safety: Not At Risk (2/26/2024)    Abuse Screen     Unsafe at Home or Work/School: no     Feels Threatened by Someone?: no     Does Anyone Keep You from Contacting Others or Doint Things Outside the Home?: no     Physical Sign of Abuse Present: no   Depression: Not at risk (10/30/2023)    PHQ-2     PHQ-2 Score: 0   Housing Stability: Unknown (10/23/2023)    Housing Stability     Current Living Arrangements: home     Potentially Unsafe Housing Conditions: Not on file   Utilities: Not At Risk (10/23/2023)    [facility] Utilities     Threatened with loss of utilities: No   Health Literacy: Unknown (10/23/2023)    Education     Help with school or training?: Not on file     Preferred Language: English   Employment: Unknown (10/12/2023)    Employment     Do you want help finding or keeping work or a job?: Not on file   Disabilities: At Risk (10/21/2023)    Disabilities     Concentrating, Remembering, or Making Decisions Difficulty: no     Doing Errands Independently Difficulty: yes        Home Medications     Prior to Admission medications    Medication Sig Start Date End Date Taking? Authorizing Provider   albuterol sulfate   (90 Base) MCG/ACT inhaler Inhale 2 puffs Every 4 (Four) Hours As Needed for Wheezing. 1/23/24   Jeanette Rubio MD   budesonide (PULMICORT) 0.5 MG/2ML nebulizer solution Take 2 mL by nebulization 2 (Two) Times a Day. 10/23/23   Olamide Dugan MD   clonazePAM (KlonoPIN) 1 MG tablet Take 1 tablet by mouth 2 (Two) Times a Day As Needed.    ProviderJuan C MD   doxepin (SINEquan) 75 MG capsule Take 1 capsule by mouth Every Night.    ProviderJuan C MD   guaiFENesin (MUCINEX) 600 MG 12 hr tablet Take 2 tablets by mouth 2 (Two) Times a Day.  Patient not taking: Reported on 10/25/2023    Juan C Franco MD   HYDROcodone-acetaminophen (NORCO) 5-325 MG per tablet Take 1 tablet by mouth Every 6 (Six) Hours As Needed for Moderate Pain. 2/26/24   Jeanette Rubio MD   ipratropium-albuterol (DUO-NEB) 0.5-2.5 mg/3 ml nebulizer Take 3 mL by nebulization Every 4 (Four) Hours As Needed for Wheezing. 10/23/23   Olamide Dugan MD   lidocaine-prilocaine (EMLA) 2.5-2.5 % cream Apply 1 application  topically to the appropriate area as directed See Admin Instructions. Apply to port site one hour prior to port being accessed. 12/4/23   Jeanette Rubio MD   LORazepam (Ativan) 1 MG tablet Take 1 tablet by mouth Daily. On MRI and Radiation days 1/30/24   Ty Goodrich MD   memantine (Namenda Titration Jose J) 28 x 5 MG & 21 x 10 MG tablet pack Follow package directions. 1/31/24   Ty Goodrich MD   memantine (NAMENDA) 10 MG tablet Take 1 tablet by mouth 2 (Two) Times a Day. 2/13/24   Ty Goodrich MD   mirtazapine (REMERON) 45 MG tablet Take 1 tablet by mouth Every Night.    ProviderJuan C MD   Nirmatrelvir & Ritonavir, 300mg/100mg, (PAXLOVID) 20 x 150 MG & 10 x 100MG tablet therapy pack tablet Take 3 tablets by mouth 2 (Two) Times a Day. Take as directed twice daily x 5 days  Patient not taking: Reported on 2/13/2024 1/24/24   Jeanette Rubio MD   OLANZapine (zyPREXA) 5 MG tablet Take 1 tablet by mouth Every Night. Take  nightly x 4 starting night of chemotherapy.  Patient not taking: Reported on 2/13/2024 1/22/24   Jeanette Rubio MD   ondansetron (ZOFRAN) 8 MG tablet Take 1 tablet by mouth 3 (Three) Times a Day As Needed for Nausea or Vomiting. 10/31/23   Jeanette Rubio MD        Objective / Physical Exam     Vital signs:  Temp: 98.4 °F (36.9 °C)  BP: 111/71  Heart Rate: 83  Resp: 16  SpO2: 98 %  Weight: 61.2 kg (135 lb)    Admission Weight: Weight: 61.2 kg (135 lb)    Physical Exam  Constitutional:       General: He is not in acute distress.     Appearance: Normal appearance. He is not toxic-appearing.   HENT:      Head: Normocephalic and atraumatic.      Nose: Nose normal. No congestion.      Mouth/Throat:      Pharynx: Oropharynx is clear. No oropharyngeal exudate.   Eyes:      General: No scleral icterus.  Cardiovascular:      Rate and Rhythm: Normal rate and regular rhythm.      Heart sounds: No murmur heard.     No friction rub. No gallop.   Pulmonary:      Effort: No respiratory distress.      Breath sounds: Rales present. No wheezing.      Comments: Stable on room air  Abdominal:      General: There is no distension.      Tenderness: There is no abdominal tenderness. There is no guarding.   Musculoskeletal:         General: No swelling, deformity or signs of injury.      Cervical back: Normal range of motion. No rigidity.   Skin:     Coloration: Skin is not jaundiced.      Findings: No bruising or lesion.   Neurological:      General: No focal deficit present.      Mental Status: He is alert and oriented to person, place, and time.      Motor: Weakness present.          Labs     Results from last 7 days   Lab Units 02/26/24 1953 02/22/24  1105   WBC 10*3/mm3 5.30 5.37   HEMOGLOBIN g/dL 10.9* 10.3*   HEMATOCRIT % 33.5* 32.4*   PLATELETS 10*3/mm3 179 196      Results from last 7 days   Lab Units 02/26/24 1953 02/22/24  1105   ALK PHOS U/L 61 58   AST (SGOT) U/L 18 15   ALT (SGPT) U/L 10 10           Results from last 7 days    Lab Units 02/26/24 1953 02/22/24  1105   SODIUM mmol/L 138 138   POTASSIUM mmol/L 3.8 3.5   CHLORIDE mmol/L 99 100   CO2 mmol/L 27.0 25.0   BUN mg/dL 6* 8   CREATININE mg/dL 0.94 1.04   GLUCOSE mg/dL 117* 142*        Imaging     CT Angiogram Chest Pulmonary Embolism    Result Date: 2/26/2024  CT ANGIOGRAM CHEST PULMONARY EMBOLISM Date of Exam: 2/26/2024 10:05 PM EST Indication: Pulmonary embolism (PE) suspected, positive D-dimer. Comparison: CT chest 1/15/2024 Technique: Axial CT images were obtained of the chest after the uneventful intravenous administration of iodinated contrast utilizing pulmonary embolism protocol.  Sagittal and coronal reconstructions were performed.  Automated exposure control and iterative reconstruction methods were used. Findings: PULMONARY VASCULATURE: There is constrictive occlusion of the distal right main pulmonary artery by obstructive right lung/invasive mediastinal mass. The left pulmonary arteries appear to be widely patent although there is respiratory motion artifact which limits visualization of the lower lobe subsegmental pulmonary arteries. Main pulmonary artery is normal in size. No evidence of right heart strain. MEDIASTINUM: There is an enlarging medial right upper lobe lung mass invading the mediastinum with complete constriction of the right mainstem bronchus. This mass now measures 9.1 cm in transverse dimension by 7.1 cm in AP dimension, previously 5.5 x 3.1  cm. There is constriction of the left upper lobe pulmonary vein. Aortic root remains dilated measuring up to 4.1 cm in diameter. There is a focal right anterior pericardial effusion measuring up to 10 mm in thickness. CORONARY ARTERIES: There is calcified atherosclerotic disease. LUNGS: There are irregular opacities within the right upper lung likely areas of developing postobstructive pneumonia without dense consolidation. Cavitary lesion within the posterior right upper lobe is similar. There is worsening  right middle lobe atelectasis. There are new tree-in-bud nodules within the right lower lobe, suggestive of infection as well. Left lung is relatively clear. PLEURAL SPACE: No effusion, mass, nor pneumothorax. LYMPH NODES: There are no pathologically enlarged lymph nodes. UPPER ABDOMEN: Unremarkable OSSEOUS STRUCTURES: Appropriate for age with no acute process identified.     Impression: 1.No evidence of pulmonary embolism. 2.Significant enlargement of the left upper lobe pulmonary mass now invading the mediastinum with complete constriction of the right main pulmonary artery, right main stem bronchus, and right upper lobe pulmonary vein. There are new postobstructive inflammatory/infectious changes involving the right upper lobe, right middle lobe, and right lower lobe. Correlate for signs of pneumonia. 3.Other stable findings. Electronically Signed: Corky Abrams MD  2/26/2024 10:41 PM EST  Workstation ID: [id number]    XR Chest 1 View    Result Date: 2/26/2024  XR CHEST 1 VW Date of Exam: 2/26/2024 7:25 PM EST Indication: cp Comparison: 1/15/2024 CT and 10/22/2023 radiograph Findings: Right chest wall port with distal lead tip overlying the superior vena cava. Unchanged cardiomediastinal silhouette. There are densities overlying the right mid and upper lung which correlate with findings seen on chest CT dated 1/15/2024. There is possible new airspace opacity in the right lung base. No pleural effusion or pneumothorax. No acute osseous abnormality.     Impression: Chronic changes of the right lung with possible new airspace opacity in the right lung base. This could represent superimposed infection. Electronically Signed: Baudilio Hastings MD  2/26/2024 7:41 PM EST  Workstation ID: [id number]        Current Medications     Scheduled Meds:  budesonide, 0.5 mg, Nebulization, BID - RT  levoFLOXacin, 750 mg, Intravenous, Once  levoFLOXacin, 750 mg, Intravenous, Q24H  sodium chloride, 10 mL, Intravenous, Q12H         Continuous  Infusions:  lactated ringers, 50 mL/hr           Mele Fuentes DO   [facility]  02/27/24   01:33 EST       Electronically signed by Mele Fuentes DO at 02/27/24 0345          Emergency Department Notes        Yakelin Easley RN at 02/27/24 0357          MRI imaging in AM    Electronically signed by Yakelin Easley RN at 02/27/24 0357       Lashanda Holman, APRN at 02/26/24 1911          Subjective   Chief Complaint   Patient presents with    Chest Pain       History of Present Illness  History provided by brother at bedside  Patient is a 59-year-old male with a history of lung cancer with metastasis to the brain, spine presents to the ED with a complaint of chest pain, loss of voice, shoulder pain.  Patient has completed radiation.  He denies any fevers.  Reports some mild nausea.  No vomiting or diarrhea.  No episodes of syncope.  No leg swelling.  Review of Systems   Constitutional:  Negative for chills and fever.   HENT:  Positive for voice change. Negative for trouble swallowing.    Respiratory:  Positive for shortness of breath.    Cardiovascular:  Positive for chest pain. Negative for palpitations and leg swelling.   Gastrointestinal:  Positive for nausea. Negative for abdominal pain, diarrhea and vomiting.   Musculoskeletal:  Positive for back pain. Negative for neck pain.   Skin:  Negative for color change and rash.   Neurological:  Negative for headaches.       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       Allergies   Allergen Reactions    Penicillins Anaphylaxis       Past Surgical History:   Procedure Laterality Date    BRONCHOSCOPY N/A 10/22/2023    Procedure: BRONCHOSCOPY WITH CRYO BIOPSY X1 AREA,  FINE NEEDLE ASPIRATION, AND BRONCHOALVEOLAR LAVAGE;  Surgeon: Genia Jenkins MD;  Location: [facility] ENDOSCOPY;  Service: Pulmonary;  Laterality: N/A;  POST: LUNG MASS       Family History    Problem Relation Age of Onset    Lung cancer Mother          at age 58    Lung cancer Brother          at age 40       Social History     Socioeconomic History    Marital status: Single   Tobacco Use    Smoking status: Former     Packs/day: 1.00     Years: 15.00     Additional pack years: 0.00     Total pack years: 15.00     Types: Cigarettes     Quit date: 10/2023     Years since quittin.4     Passive exposure: Current    Smokeless tobacco: Current   Vaping Use    Vaping Use: Never used   Substance and Sexual Activity    Alcohol use: Never    Drug use: Never    Sexual activity: Defer           Objective   Physical Exam  Vitals and nursing note reviewed.   Constitutional:       Appearance: He is not toxic-appearing.   HENT:      Head: Normocephalic and atraumatic.   Eyes:      Extraocular Movements: Extraocular movements intact.      Pupils: Pupils are equal, round, and reactive to light.   Cardiovascular:      Rate and Rhythm: Normal rate and regular rhythm.   Pulmonary:      Effort: Pulmonary effort is normal.      Breath sounds: Normal breath sounds.   Abdominal:      General: Bowel sounds are normal.      Palpations: Abdomen is soft.   Musculoskeletal:         General: Normal range of motion.      Cervical back: Normal range of motion and neck supple.      Right lower leg: No tenderness. No edema.      Left lower leg: No tenderness. No edema.   Skin:     General: Skin is warm and dry.      Capillary Refill: Capillary refill takes less than 2 seconds.   Neurological:      General: No focal deficit present.      Mental Status: He is alert and oriented to person, place, and time.         Procedures          ED Course  ED Course as of 24 0220   Mon  XR Chest 1 View [LB]   2140 Awaiting CT [LB]   2143 IV therapy consulted for placement.  [LB]   2253 EKG sinus rhythm rate of 86.  Compared to previous 10/20/2023.  No acute ST changes.  [facility] ED attending physician. [LB]  "  2323 Consult to hospitalist for admission.  [LB]      ED Course User Index  [LB] Lashanda Holman, APRN      /71   Pulse 83   Temp 98.4 °F (36.9 °C) (Oral)   Resp 16   Ht 180.3 cm (71\")   Wt 61.2 kg (135 lb)   SpO2 98%   BMI 18.83 kg/m²   Medications   sodium chloride 0.9 % flush 10 mL (has no administration in time range)   levoFLOXacin (LEVAQUIN) 750 mg/150 mL D5W (premix) (LEVAQUIN) 750 mg (has no administration in time range)   lactated ringers infusion (50 mL/hr Intravenous New Bag 2/27/24 0214)   LORazepam (ATIVAN) tablet 1 mg (has no administration in time range)   albuterol (PROVENTIL) nebulizer solution 0.083% 2.5 mg/3mL (has no administration in time range)   budesonide (PULMICORT) nebulizer solution 0.5 mg (0.5 mg Nebulization Not Given 2/27/24 0139)   sodium chloride 0.9 % flush 10 mL (10 mL Intravenous Given 2/27/24 0215)   sodium chloride 0.9 % flush 10 mL (has no administration in time range)   sodium chloride 0.9 % infusion 40 mL (has no administration in time range)   ondansetron ODT (ZOFRAN-ODT) disintegrating tablet 4 mg (has no administration in time range)     Or   ondansetron (ZOFRAN) injection 4 mg (has no administration in time range)   nitroglycerin (NITROSTAT) SL tablet 0.4 mg (has no administration in time range)   Potassium Replacement - Follow Nurse / BPA Driven Protocol (has no administration in time range)   Magnesium Standard Dose Replacement - Follow Nurse / BPA Driven Protocol (has no administration in time range)   Phosphorus Replacement - Follow Nurse / BPA Driven Protocol (has no administration in time range)   Calcium Replacement - Follow Nurse / BPA Driven Protocol (has no administration in time range)   morphine injection 4 mg (4 mg Intravenous Given 2/26/24 1945)   ondansetron (ZOFRAN) injection 4 mg (4 mg Intravenous Given 2/26/24 1945)   iopamidol (ISOVUE-370) 76 % injection 100 mL (100 mL Intravenous Given 2/26/24 2225)   levoFLOXacin (LEVAQUIN) 750 " mg/150 mL D5W (premix) (LEVAQUIN) 750 mg (0 mg Intravenous Stopped 2/27/24 0136)     CT Angiogram Chest Pulmonary Embolism    Result Date: 2/26/2024  Impression: 1.No evidence of pulmonary embolism. 2.Significant enlargement of the left upper lobe pulmonary mass now invading the mediastinum with complete constriction of the right main pulmonary artery, right main stem bronchus, and right upper lobe pulmonary vein. There are new postobstructive inflammatory/infectious changes involving the right upper lobe, right middle lobe, and right lower lobe. Correlate for signs of pneumonia. 3.Other stable findings. Electronically Signed: Corky Abrams MD  2/26/2024 10:41 PM EST  Workstation ID: [id number]    XR Chest 1 View    Result Date: 2/26/2024  Impression: Chronic changes of the right lung with possible new airspace opacity in the right lung base. This could represent superimposed infection. Electronically Signed: Baudilio Hastings MD  2/26/2024 7:41 PM EST  Workstation ID: [id number]   Lab Results (last 24 hours)       Procedure Component Value Units Date/Time    Respiratory Panel PCR w/COVID-19(SARS-CoV-2) KEESHA/CHAZ/JOSE/PAD/COR/PEDRO In-House, NP Swab in UTM/VTM, 2 HR TAT - Swab, Nasopharynx [[id number]]  (Normal) Collected: 02/26/24 1953    Specimen: Swab from Nasopharynx Updated: 02/26/24 2101     ADENOVIRUS, PCR Not Detected     Coronavirus 229E Not Detected     Coronavirus HKU1 Not Detected     Coronavirus NL63 Not Detected     Coronavirus OC43 Not Detected     COVID19 Not Detected     Human Metapneumovirus Not Detected     Human Rhinovirus/Enterovirus Not Detected     Influenza A PCR Not Detected     Influenza B PCR Not Detected     Parainfluenza Virus 1 Not Detected     Parainfluenza Virus 2 Not Detected     Parainfluenza Virus 3 Not Detected     Parainfluenza Virus 4 Not Detected     RSV, PCR Not Detected     Bordetella pertussis pcr Not Detected     Bordetella parapertussis PCR Not Detected     Chlamydophila pneumoniae PCR  Not Detected     Mycoplasma pneumo by PCR Not Detected    Narrative:      In the setting of a positive respiratory panel with a viral infection PLUS a negative procalcitonin without other underlying concern for bacterial infection, consider observing off antibiotics or discontinuation of antibiotics and continue supportive care. If the respiratory panel is positive for atypical bacterial infection (Bordetella pertussis, Chlamydophila pneumoniae, or Mycoplasma pneumoniae), consider antibiotic de-escalation to target atypical bacterial infection.    CBC & Differential [[id number]]  (Abnormal) Collected: 02/26/24 1953    Specimen: Blood Updated: 02/26/24 1957    Narrative:      The following orders were created for panel order CBC & Differential.  Procedure                               Abnormality         Status                     ---------                               -----------         ------                     CBC Auto Differential[[id number]]        Abnormal            Final result                 Please view results for these tests on the individual orders.    Comprehensive Metabolic Panel [[id number]]  (Abnormal) Collected: 02/26/24 1953    Specimen: Blood Updated: 02/26/24 2020     Glucose 117 mg/dL      BUN 6 mg/dL      Creatinine 0.94 mg/dL      Sodium 138 mmol/L      Potassium 3.8 mmol/L      Chloride 99 mmol/L      CO2 27.0 mmol/L      Calcium 9.6 mg/dL      Total Protein 7.0 g/dL      Albumin 3.6 g/dL      ALT (SGPT) 10 U/L      AST (SGOT) 18 U/L      Alkaline Phosphatase 61 U/L      Total Bilirubin 0.2 mg/dL      Globulin 3.4 gm/dL      A/G Ratio 1.1 g/dL      BUN/Creatinine Ratio 6.4     Anion Gap 12.0 mmol/L      eGFR 92.8 mL/min/1.73     Narrative:      GFR Normal >60  Chronic Kidney Disease <60  Kidney Failure <15      High Sensitivity Troponin T [[id number]]  (Abnormal) Collected: 02/26/24 1953    Specimen: Blood Updated: 02/26/24 2020     HS Troponin T 23 ng/L     Narrative:      High Sensitive  Troponin T Reference Range:  <14.0 ng/L- Negative Female for AMI  <22.0 ng/L- Negative Male for AMI  >=14 - Abnormal Female indicating possible myocardial injury.  >=22 - Abnormal Male indicating possible myocardial injury.   Clinicians would have to utilize clinical acumen, EKG, Troponin, and serial changes to determine if it is an Acute Myocardial Infarction or myocardial injury due to an underlying chronic condition.         CBC Auto Differential [[id number]]  (Abnormal) Collected: 02/26/24 1953    Specimen: Blood Updated: 02/26/24 1957     WBC 5.30 10*3/mm3      RBC 3.24 10*6/mm3      Hemoglobin 10.9 g/dL      Hematocrit 33.5 %      .3 fL      MCH 33.6 pg      MCHC 32.5 g/dL      RDW 17.4 %      RDW-SD 63.0 fl      MPV 7.6 fL      Platelets 179 10*3/mm3      Neutrophil % 73.9 %      Lymphocyte % 11.5 %      Monocyte % 8.1 %      Eosinophil % 5.8 %      Basophil % 0.7 %      Neutrophils, Absolute 3.90 10*3/mm3      Lymphocytes, Absolute 0.60 10*3/mm3      Monocytes, Absolute 0.40 10*3/mm3      Eosinophils, Absolute 0.30 10*3/mm3      Basophils, Absolute 0.00 10*3/mm3      nRBC 0.1 /100 WBC     BNP [[id number]]  (Normal) Collected: 02/26/24 1953    Specimen: Blood Updated: 02/26/24 2020     proBNP 60.3 pg/mL     Narrative:      This assay is used as an aid in the diagnosis of individuals suspected of having heart failure. It can be used as an aid in the diagnosis of acute decompensated heart failure (ADHF) in patients presenting with signs and symptoms of ADHF to the emergency department (ED). In addition, NT-proBNP of <300 pg/mL indicates ADHF is not likely.    Age Range Result Interpretation  NT-proBNP Concentration (pg/mL:      <50             Positive            >450                   Gray                 300-450                    Negative             <300    50-75           Positive            >900                  Gray                300-900                  Negative            <300      >75             " Positive            >1800                  Gray                300-1800                  Negative            <300    D-dimer, Quantitative [[id number]]  (Abnormal) Collected: 02/26/24 1953    Specimen: Blood Updated: 02/26/24 2009     D-Dimer, Quantitative 1.57 mg/L (FEU)     Narrative:      According to the assay 's published package insert, a normal (<0.50 mg/L (FEU)) D-dimer result in conjunction with a non-high clinical probability assessment, excludes deep vein thrombosis (DVT) and pulmonary embolism (PE) with high sensitivity.    D-dimer values increase with age and this can make VTE exclusion of an older population difficult. To address this, the [employer], based on best available evidence and recent guidelines, recommends that clinicians use age-adjusted D-dimer thresholds in patients greater than 50 years of age with: a) a low probability of PE who do not meet all Pulmonary Embolism Rule Out Criteria, or b) in those with intermediate probability of PE.   The formula for an age-adjusted D-dimer cut-off is \"age/100\".  For example, a 60 year old patient would have an age-adjusted cut-off of 0.60 mg/L (FEU) and an 80 year old 0.80 mg/L (FEU).    High Sensitivity Troponin T 2Hr [[id number]]  (Abnormal) Collected: 02/26/24 2142    Specimen: Blood Updated: 02/26/24 2203     HS Troponin T 23 ng/L      Troponin T Delta 0 ng/L     Narrative:      High Sensitive Troponin T Reference Range:  <14.0 ng/L- Negative Female for AMI  <22.0 ng/L- Negative Male for AMI  >=14 - Abnormal Female indicating possible myocardial injury.  >=22 - Abnormal Male indicating possible myocardial injury.   Clinicians would have to utilize clinical acumen, EKG, Troponin, and serial changes to determine if it is an Acute Myocardial Infarction or myocardial injury due to an underlying chronic condition.         Blood Culture - Blood, Arm, Right [[id number]] Collected: 02/26/24 2325    Specimen: Blood from Arm, " Right Updated: 02/26/24 2331    Blood Culture - Blood, Arm, Right [[id number]] Collected: 02/26/24 2325    Specimen: Blood from Arm, Right Updated: 02/26/24 2331    Lactic Acid, Plasma [[id number]]  (Normal) Collected: 02/26/24 2325    Specimen: Blood Updated: 02/27/24 0001     Lactate 1.1 mmol/L     POC Lactate [[id number]]  (Normal) Collected: 02/26/24 2332    Specimen: Blood Updated: 02/26/24 2334     Lactate 0.9 mmol/L      Comment: Serial Number: [id number]:  [id number]                                                    Medical Decision Making  Problems Addressed:  Lung mass: complicated acute illness or injury  Pneumonia due to infectious organism, unspecified laterality, unspecified part of lung: complicated acute illness or injury    Amount and/or Complexity of Data Reviewed  Labs: ordered.  Radiology: ordered. Decision-making details documented in ED Course.  ECG/medicine tests: ordered.    Risk  Prescription drug management.  Decision regarding hospitalization.    Chart review oncology note 2/13/2024.  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    Labs: RVP negative.  Troponin 23.  BNP normal.  CMP reviewed CBC reviewed.  D-dimer elevated, CT was obtained  Imaging: CT Angiogram Chest Pulmonary Embolism    Result Date: 2/26/2024  Impression: 1.No evidence of pulmonary embolism. 2.Significant enlargement of the left upper lobe pulmonary mass now invading the mediastinum with complete constriction of the right main pulmonary artery, right main stem bronchus, and right upper lobe pulmonary vein. There are new postobstructive inflammatory/infectious changes involving the right upper lobe, right middle lobe, and right lower lobe. Correlate for signs of pneumonia. 3.Other stable findings. Electronically Signed: Corky Abrams MD  2/26/2024 10:41 PM EST  Workstation ID: [id number]    XR Chest 1 View    Result Date: 2/26/2024  Impression: Chronic changes of the right lung with possible new airspace opacity in  the right lung base. This could represent superimposed infection. Electronically Signed: Baudilio Hastings MD  2/26/2024 7:41 PM EST  Workstation ID: [id number]   Differential diagnoses considered for patient presentation, this list is not all inclusive of diagnoses considered: Pneumonia, PE, acute coronary syndrome.    Patient presents to the ED for the above complaint, underwent the above, exam and workup.  Placed on appropriate monitoring.  Patient's workup in the ED reveals enlarging mass, also possible pneumonia, given patient's loss of voice, pain will place on Levaquin.  He does have allergy of anaphylaxis to penicillin.  Placed on Levaquin.  Patient will be admitted to hospital service for further evaluation management  Discussion/Consultation with other providers: Hospitalist for admission  Disposition: I discussed with the patient their test results, work-up here in the emergency department, and need for admission and further evaluation. Patient is agreeable to the plan of care. At time of disposition patient's VS are reviewed, and patient without acute distress.  Opportunity was provided for questions at the bedside, all questions and concerns were addressed.  Note Disclaimer: At [facility], we believe that sharing information builds trust and better relationships. You are receiving this note because you recently visited [facility]. It is possible you will see health information before a provider has talked with you about it. This kind of information can be easy to misunderstand. To help you fully understand what it means for your health, we urge you to discuss this note with your provider.Note dictated utilizing Dragon Dictation. Appropriate PPE worn during patient interactions.        Final diagnoses:   Pneumonia due to infectious organism, unspecified laterality, unspecified part of lung   Lung mass       ED Disposition  ED Disposition       ED Disposition   Decision to Admit    Condition   --     Comment   Level of Care: Telemetry [5]   Admitting Physician: AME LAIRD [[id number]]   Attending Physician: AME LAIRD [[id number]]                 No follow-up provider specified.       Medication List      No changes were made to your prescriptions during this visit.            Lashanda Holman APRN  02/27/24 0220      Electronically signed by Lashanda Holman APRN at 02/27/24 0220       Vital Signs (last day)       Date/Time Temp Temp src Pulse Resp BP Patient Position SpO2    02/27/24 1218 97.9 (36.6) Oral -- 20 106/66 Lying --    02/27/24 1101 -- -- 90 -- 97/67 -- 94    02/27/24 1030 -- -- 94 -- 103/69 -- 96    02/27/24 1001 -- -- 90 -- 103/65 -- 95    02/27/24 0931 -- -- 89 -- 113/68 -- 97    02/27/24 0901 -- -- 87 -- 103/72 -- 96    02/27/24 0831 -- -- 91 -- 98/67 -- 95    02/27/24 0801 98 (36.7) Oral 90 16 99/65 Lying 94    02/27/24 0731 -- -- 86 -- 99/65 -- 95    02/27/24 0701 -- -- 88 -- 99/67 -- 95    02/27/24 0531 -- -- 85 14 106/69 -- 96    02/27/24 0401 -- -- 82 16 111/69 -- 100    02/27/24 0331 -- -- 86 16 102/62 -- 99    02/27/24 0231 -- -- 87 18 102/64 -- 100    02/27/24 0031 -- -- 83 16 111/71 -- 98    02/26/24 2331 -- -- 89 18 110/80 -- 96    02/26/24 2301 -- -- 90 -- 112/78 -- 99    02/26/24 2031 -- -- 86 16 127/80 -- 99    02/26/24 1946 -- -- 88 18 118/73 -- 98    02/26/24 1856 -- -- -- 16 -- -- --    02/26/24 1845 98.4 (36.9) Oral 88 -- 123/77 Sitting 99          Oxygen Therapy (last day)       Date/Time SpO2 Device (Oxygen Therapy) Flow (L/min) Oxygen Concentration (%) ETCO2 (mmHg)    02/27/24 1218 -- room air -- -- --    02/27/24 1101 94 -- -- -- --    02/27/24 1030 96 -- -- -- --    02/27/24 1001 95 -- -- -- --    02/27/24 0931 97 -- -- -- --    02/27/24 0901 96 -- -- -- --    02/27/24 0831 95 -- -- -- --    02/27/24 0801 94 room air -- -- --    02/27/24 0731 95 -- -- -- --    02/27/24 0701 95 -- -- -- --    02/27/24 0531 96 room air -- -- --    02/27/24 0401 100 room  air -- -- --    02/27/24 0331 99 room air -- -- --    02/27/24 0231 100 room air -- -- --    02/27/24 0031 98 room air -- -- --    02/26/24 2331 96 room air -- -- --    02/26/24 2301 99 -- -- -- --    02/26/24 2031 99 room air -- -- --    02/26/24 1946 98 room air -- -- --    02/26/24 1845 99 room air -- -- --          Facility-Administered Medications as of 2/26/2024   Medication Dose Route Frequency Provider Last Rate Last Admin    albuterol (PROVENTIL) nebulizer solution 0.083% 2.5 mg/3mL  2.5 mg Nebulization Q4H PRN Mele Fuentes DO        budesonide (PULMICORT) nebulizer solution 0.5 mg  0.5 mg Nebulization BID - RT Mele Fuentes DO        Calcium Replacement - Follow Nurse / BPA Driven Protocol   Does not apply PRN Mele Fuentes DO        heparin (porcine) 5000 UNIT/ML injection 5,000 Units  5,000 Units Subcutaneous Q8H Stephanie Torres MD        [COMPLETED] iopamidol (ISOVUE-370) 76 % injection 100 mL  100 mL Intravenous Once in imaging Juice Beard MD   100 mL at 02/26/24 2225    lactated ringers infusion  50 mL/hr Intravenous Continuous Mele Fuentes DO 50 mL/hr at 02/27/24 0635 50 mL/hr at 02/27/24 0635    [COMPLETED] levoFLOXacin (LEVAQUIN) 750 mg/150 mL D5W (premix) (LEVAQUIN) 750 mg  750 mg Intravenous Once Lashanda Holman APRN   Stopped at 02/27/24 0136    [START ON 2/28/2024] levoFLOXacin (LEVAQUIN) 750 mg/150 mL D5W (premix) (LEVAQUIN) 750 mg  750 mg Intravenous Q24H Mele Fuentes DO        LORazepam (ATIVAN) tablet 1 mg  1 mg Oral Q6H PRN Mele Fuentes DO   1 mg at 02/27/24 0248    Magnesium Standard Dose Replacement - Follow Nurse / BPA Driven Protocol   Does not apply PRN Mele Fuentes DO        methylPREDNISolone sodium succinate (SOLU-Medrol) injection 40 mg  40 mg Intravenous Q8H Stephanie Torres MD   40 mg at 02/27/24 1137    [COMPLETED] morphine injection 4 mg  4 mg Intravenous Once Lashanda Holman APRN   4 mg at 02/26/24 1945    nitroglycerin (NITROSTAT) SL  tablet 0.4 mg  0.4 mg Sublingual Q5 Min PRN Mele Fuentes DO        [COMPLETED] ondansetron (ZOFRAN) injection 4 mg  4 mg Intravenous Once Lashanda Holman APRN   4 mg at 02/26/24 1945    ondansetron ODT (ZOFRAN-ODT) disintegrating tablet 4 mg  4 mg Oral Q6H PRN Mele Fuentes DO        Or    ondansetron (ZOFRAN) injection 4 mg  4 mg Intravenous Q6H PRN Mele Fuentes DO        Phosphorus Replacement - Follow Nurse / BPA Driven Protocol   Does not apply PRN Mele Fuentes DO        potassium chloride (K-DUR,KLOR-CON) CR tablet 40 mEq  40 mEq Oral Q4H Pedro Pablo Contreras MD   40 mEq at 02/27/24 0842    Potassium Replacement - Follow Nurse / BPA Driven Protocol   Does not apply PRN Mele Fuentes DO        sodium chloride 0.9 % flush 10 mL  10 mL Intravenous PRN Mele Fuentes DO        sodium chloride 0.9 % flush 10 mL  10 mL Intravenous Q12H Mele Fuentes DO   10 mL at 02/27/24 0831    sodium chloride 0.9 % flush 10 mL  10 mL Intravenous PRN Mele Fuentes DO        sodium chloride 0.9 % infusion 40 mL  40 mL Intravenous PRN Mele Fuentes DO         Lab Results (last 24 hours)       Procedure Component Value Units Date/Time    Basic Metabolic Panel [[id number]]  (Abnormal) Collected: 02/27/24 0410    Specimen: Blood Updated: 02/27/24 0441     Glucose 110 mg/dL      BUN 5 mg/dL      Creatinine 0.98 mg/dL      Sodium 137 mmol/L      Potassium 3.4 mmol/L      Chloride 98 mmol/L      CO2 30.0 mmol/L      Calcium 9.4 mg/dL      BUN/Creatinine Ratio 5.1     Anion Gap 9.0 mmol/L      eGFR 88.3 mL/min/1.73     Narrative:      GFR Normal >60  Chronic Kidney Disease <60  Kidney Failure <15      CBC (No Diff) [[id number]]  (Abnormal) Collected: 02/27/24 0410    Specimen: Blood Updated: 02/27/24 0416     WBC 4.70 10*3/mm3      RBC 3.04 10*6/mm3      Hemoglobin 10.1 g/dL      Hematocrit 31.2 %      .5 fL      MCH 33.2 pg      MCHC 32.4 g/dL      RDW 17.1 %      RDW-SD 61.7 fl      MPV 7.2 fL       Platelets 179 10*3/mm3     MRSA Screen, PCR (Inpatient) - Swab, Nares [[id number]]  (Normal) Collected: 02/27/24 0217    Specimen: Swab from Nares Updated: 02/27/24 0343     MRSA PCR No MRSA Detected    Narrative:      The negative predictive value of this diagnostic test is high and should only be used to consider de-escalating anti-MRSA therapy. A positive result may indicate colonization with MRSA and must be correlated clinically.    Legionella Antigen, Urine - Urine, Urine, Clean Catch [[id number]]  (Normal) Collected: 02/27/24 0217    Specimen: Urine, Clean Catch Updated: 02/27/24 0239     LEGIONELLA ANTIGEN, URINE Negative    S. Pneumo Ag Urine or CSF - Urine, Urine, Clean Catch [[id number]]  (Normal) Collected: 02/27/24 0217    Specimen: Urine, Clean Catch Updated: 02/27/24 0239     Strep Pneumo Ag Negative    Lactic Acid, Plasma [[id number]]  (Normal) Collected: 02/26/24 2325    Specimen: Blood Updated: 02/27/24 0001     Lactate 1.1 mmol/L     POC Lactate [[id number]]  (Normal) Collected: 02/26/24 2332    Specimen: Blood Updated: 02/26/24 2334     Lactate 0.9 mmol/L      Comment: Serial Number: [id number]:  [id number]       Blood Culture - Blood, Arm, Right [[id number]] Collected: 02/26/24 2325    Specimen: Blood from Arm, Right Updated: 02/26/24 2331    Blood Culture - Blood, Arm, Right [[id number]] Collected: 02/26/24 2325    Specimen: Blood from Arm, Right Updated: 02/26/24 2331    High Sensitivity Troponin T 2Hr [[id number]]  (Abnormal) Collected: 02/26/24 2142    Specimen: Blood Updated: 02/26/24 2203     HS Troponin T 23 ng/L      Troponin T Delta 0 ng/L     Narrative:      High Sensitive Troponin T Reference Range:  <14.0 ng/L- Negative Female for AMI  <22.0 ng/L- Negative Male for AMI  >=14 - Abnormal Female indicating possible myocardial injury.  >=22 - Abnormal Male indicating possible myocardial injury.   Clinicians would have to utilize clinical acumen, EKG, Troponin, and serial changes to  determine if it is an Acute Myocardial Infarction or myocardial injury due to an underlying chronic condition.         Respiratory Panel PCR w/COVID-19(SARS-CoV-2) KEESHA/CHAZ/JOSE/PAD/COR/PEDRO In-House, NP Swab in UTM/VTM, 2 HR TAT - Swab, Nasopharynx [[id number]]  (Normal) Collected: 02/26/24 1953    Specimen: Swab from Nasopharynx Updated: 02/26/24 2101     ADENOVIRUS, PCR Not Detected     Coronavirus 229E Not Detected     Coronavirus HKU1 Not Detected     Coronavirus NL63 Not Detected     Coronavirus OC43 Not Detected     COVID19 Not Detected     Human Metapneumovirus Not Detected     Human Rhinovirus/Enterovirus Not Detected     Influenza A PCR Not Detected     Influenza B PCR Not Detected     Parainfluenza Virus 1 Not Detected     Parainfluenza Virus 2 Not Detected     Parainfluenza Virus 3 Not Detected     Parainfluenza Virus 4 Not Detected     RSV, PCR Not Detected     Bordetella pertussis pcr Not Detected     Bordetella parapertussis PCR Not Detected     Chlamydophila pneumoniae PCR Not Detected     Mycoplasma pneumo by PCR Not Detected    Narrative:      In the setting of a positive respiratory panel with a viral infection PLUS a negative procalcitonin without other underlying concern for bacterial infection, consider observing off antibiotics or discontinuation of antibiotics and continue supportive care. If the respiratory panel is positive for atypical bacterial infection (Bordetella pertussis, Chlamydophila pneumoniae, or Mycoplasma pneumoniae), consider antibiotic de-escalation to target atypical bacterial infection.    Comprehensive Metabolic Panel [[id number]]  (Abnormal) Collected: 02/26/24 1953    Specimen: Blood Updated: 02/26/24 2020     Glucose 117 mg/dL      BUN 6 mg/dL      Creatinine 0.94 mg/dL      Sodium 138 mmol/L      Potassium 3.8 mmol/L      Chloride 99 mmol/L      CO2 27.0 mmol/L      Calcium 9.6 mg/dL      Total Protein 7.0 g/dL      Albumin 3.6 g/dL      ALT (SGPT) 10 U/L      AST (SGOT) 18  U/L      Alkaline Phosphatase 61 U/L      Total Bilirubin 0.2 mg/dL      Globulin 3.4 gm/dL      A/G Ratio 1.1 g/dL      BUN/Creatinine Ratio 6.4     Anion Gap 12.0 mmol/L      eGFR 92.8 mL/min/1.73     Narrative:      GFR Normal >60  Chronic Kidney Disease <60  Kidney Failure <15      High Sensitivity Troponin T [[id number]]  (Abnormal) Collected: 02/26/24 1953    Specimen: Blood Updated: 02/26/24 2020     HS Troponin T 23 ng/L     Narrative:      High Sensitive Troponin T Reference Range:  <14.0 ng/L- Negative Female for AMI  <22.0 ng/L- Negative Male for AMI  >=14 - Abnormal Female indicating possible myocardial injury.  >=22 - Abnormal Male indicating possible myocardial injury.   Clinicians would have to utilize clinical acumen, EKG, Troponin, and serial changes to determine if it is an Acute Myocardial Infarction or myocardial injury due to an underlying chronic condition.         BNP [[id number]]  (Normal) Collected: 02/26/24 1953    Specimen: Blood Updated: 02/26/24 2020     proBNP 60.3 pg/mL     Narrative:      This assay is used as an aid in the diagnosis of individuals suspected of having heart failure. It can be used as an aid in the diagnosis of acute decompensated heart failure (ADHF) in patients presenting with signs and symptoms of ADHF to the emergency department (ED). In addition, NT-proBNP of <300 pg/mL indicates ADHF is not likely.    Age Range Result Interpretation  NT-proBNP Concentration (pg/mL:      <50             Positive            >450                   Gray                 300-450                    Negative             <300    50-75           Positive            >900                  Gray                300-900                  Negative            <300      >75             Positive            >1800                  Gray                300-1800                  Negative            <300    D-dimer, Quantitative [[id number]]  (Abnormal) Collected: 02/26/24 1953    Specimen: Blood Updated:  "02/26/24 2009     D-Dimer, Quantitative 1.57 mg/L (FEU)     Narrative:      According to the assay 's published package insert, a normal (<0.50 mg/L (FEU)) D-dimer result in conjunction with a non-high clinical probability assessment, excludes deep vein thrombosis (DVT) and pulmonary embolism (PE) with high sensitivity.    D-dimer values increase with age and this can make VTE exclusion of an older population difficult. To address this, the [employer], based on best available evidence and recent guidelines, recommends that clinicians use age-adjusted D-dimer thresholds in patients greater than 50 years of age with: a) a low probability of PE who do not meet all Pulmonary Embolism Rule Out Criteria, or b) in those with intermediate probability of PE.   The formula for an age-adjusted D-dimer cut-off is \"age/100\".  For example, a 60 year old patient would have an age-adjusted cut-off of 0.60 mg/L (FEU) and an 80 year old 0.80 mg/L (FEU).    CBC & Differential [[id number]]  (Abnormal) Collected: 02/26/24 1953    Specimen: Blood Updated: 02/26/24 1957    Narrative:      The following orders were created for panel order CBC & Differential.  Procedure                               Abnormality         Status                     ---------                               -----------         ------                     CBC Auto Differential[[id number]]        Abnormal            Final result                 Please view results for these tests on the individual orders.    CBC Auto Differential [[id number]]  (Abnormal) Collected: 02/26/24 1953    Specimen: Blood Updated: 02/26/24 1957     WBC 5.30 10*3/mm3      RBC 3.24 10*6/mm3      Hemoglobin 10.9 g/dL      Hematocrit 33.5 %      .3 fL      MCH 33.6 pg      MCHC 32.5 g/dL      RDW 17.4 %      RDW-SD 63.0 fl      MPV 7.6 fL      Platelets 179 10*3/mm3      Neutrophil % 73.9 %      Lymphocyte % 11.5 %      Monocyte % 8.1 %      Eosinophil % 5.8 " %      Basophil % 0.7 %      Neutrophils, Absolute 3.90 10*3/mm3      Lymphocytes, Absolute 0.60 10*3/mm3      Monocytes, Absolute 0.40 10*3/mm3      Eosinophils, Absolute 0.30 10*3/mm3      Basophils, Absolute 0.00 10*3/mm3      nRBC 0.1 /100 WBC           Imaging Results (Last 24 Hours)       Procedure Component Value Units Date/Time    CT Angiogram Chest Pulmonary Embolism [[id number]] Collected: 02/26/24 2234     Updated: 02/26/24 2243    Narrative:      CT ANGIOGRAM CHEST PULMONARY EMBOLISM    Date of Exam: 2/26/2024 10:05 PM EST    Indication: Pulmonary embolism (PE) suspected, positive D-dimer.    Comparison: CT chest 1/15/2024    Technique: Axial CT images were obtained of the chest after the uneventful intravenous administration of iodinated contrast utilizing pulmonary embolism protocol.  Sagittal and coronal reconstructions were performed.  Automated exposure control and   iterative reconstruction methods were used.      Findings:  PULMONARY VASCULATURE: There is constrictive occlusion of the distal right main pulmonary artery by obstructive right lung/invasive mediastinal mass. The left pulmonary arteries appear to be widely patent although there is respiratory motion artifact   which limits visualization of the lower lobe subsegmental pulmonary arteries. Main pulmonary artery is normal in size. No evidence of right heart strain.    MEDIASTINUM: There is an enlarging medial right upper lobe lung mass invading the mediastinum with complete constriction of the right mainstem bronchus. This mass now measures 9.1 cm in transverse dimension by 7.1 cm in AP dimension, previously 5.5 x 3.1   cm. There is constriction of the left upper lobe pulmonary vein. Aortic root remains dilated measuring up to 4.1 cm in diameter. There is a focal right anterior pericardial effusion measuring up to 10 mm in thickness.  CORONARY ARTERIES: There is calcified atherosclerotic disease.  LUNGS: There are irregular opacities within  the right upper lung likely areas of developing postobstructive pneumonia without dense consolidation. Cavitary lesion within the posterior right upper lobe is similar. There is worsening right middle lobe   atelectasis. There are new tree-in-bud nodules within the right lower lobe, suggestive of infection as well. Left lung is relatively clear.  PLEURAL SPACE: No effusion, mass, nor pneumothorax.  LYMPH NODES: There are no pathologically enlarged lymph nodes.    UPPER ABDOMEN: Unremarkable    OSSEOUS STRUCTURES: Appropriate for age with no acute process identified.      Impression:      Impression:  1.No evidence of pulmonary embolism.  2.Significant enlargement of the left upper lobe pulmonary mass now invading the mediastinum with complete constriction of the right main pulmonary artery, right main stem bronchus, and right upper lobe pulmonary vein. There are new postobstructive   inflammatory/infectious changes involving the right upper lobe, right middle lobe, and right lower lobe. Correlate for signs of pneumonia.  3.Other stable findings.        Electronically Signed: Corky Abrams MD    2/26/2024 10:41 PM EST    Workstation ID: [id number]    XR Chest 1 View [[id number]] Collected: 02/26/24 1939     Updated: 02/26/24 1943    Narrative:      XR CHEST 1 VW    Date of Exam: 2/26/2024 7:25 PM EST    Indication: cp    Comparison: 1/15/2024 CT and 10/22/2023 radiograph    Findings:  Right chest wall port with distal lead tip overlying the superior vena cava. Unchanged cardiomediastinal silhouette. There are densities overlying the right mid and upper lung which correlate with findings seen on chest CT dated 1/15/2024. There is   possible new airspace opacity in the right lung base. No pleural effusion or pneumothorax. No acute osseous abnormality.      Impression:      Impression:  Chronic changes of the right lung with possible new airspace opacity in the right lung base. This could represent superimposed  infection.      Electronically Signed: Baudilio Hastings MD    2/26/2024 7:41 PM EST    Workstation ID: [id number]          Operative/Procedure Notes (all)    No notes of this type exist for this encounter.       Physician Progress Notes (all)    No notes of this type exist for this encounter.          Consult Notes (all)        Stephanie Torres MD at 02/27/24 0749        Consult Orders    1. Inpatient Pulmonology Consult [[id number]] ordered by Mele Fuentes DO at 02/27/24 0133                 Group: [facility]         CONSULT NOTE    Patient Identification:  Romero Maciel  59 y.o.  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 Negative for wheezing and stridor.    Cardiovascular:  Negative for chest pain, palpitations and leg swelling.       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       Past Surgical History:  Past Surgical History:   Procedure Laterality Date    BRONCHOSCOPY N/A 10/22/2023    Procedure: BRONCHOSCOPY WITH CRYO BIOPSY X1 AREA,  FINE NEEDLE ASPIRATION, AND BRONCHOALVEOLAR LAVAGE;  Surgeon: Genia Jenkins MD;  Location: [facility];  Service: Pulmonary;  Laterality: N/A;  POST: LUNG MASS        Home Meds:  (Not in a hospital admission)      Allergies:  Allergies   Allergen Reactions    Penicillins Anaphylaxis       Social History:   Social History     Socioeconomic History    Marital status: Single   Tobacco Use    Smoking status: Former     Packs/day: 1.00     Years: 15.00     Additional pack years: 0.00     Total pack years: 15.00     Types: Cigarettes     Quit date: 10/2023     Years since quittin.4     Passive exposure: Current    Smokeless tobacco: Current   Vaping Use    Vaping Use: Never used   Substance and Sexual Activity    Alcohol use: Never    Drug use: Never    Sexual activity: Defer       Family History:  Family History   Problem Relation Age of Onset    Lung cancer Mother          at age 58    Lung cancer Brother          at age 40       Physical Exam:  /69   Pulse 85   Temp 98.4 °F (36.9 °C) (Oral)   Resp 14   Ht 180.3 cm (71\")   Wt 61.2 kg (135 lb)   SpO2 96%   BMI 18.83 kg/m²  Body mass index is 18.83 kg/m². 96% 61.2 kg (135 lb)  Physical Exam  Cardiovascular:      Heart sounds: Murmur heard.   Pulmonary:      Effort: No respiratory distress.      Breath sounds: No stridor. Rhonchi and rales present. No wheezing.   Chest:      Chest wall: No tenderness.         LABS:  Lab Results   Component Value Date    CALCIUM 9.4 2024     Results from last 7 days   Lab Units 24  0410 243 24  1105   SODIUM mmol/L 137 138 138   POTASSIUM mmol/L 3.4* 3.8 3.5   CHLORIDE mmol/L 98 99 100   CO2 mmol/L 30.0* 27.0 25.0   BUN mg/dL 5* " 6* 8   CREATININE mg/dL 0.98 0.94 1.04   GLUCOSE mg/dL 110* 117* 142*   CALCIUM mg/dL 9.4 9.6 9.4   WBC 10*3/mm3 4.70 5.30 5.37   HEMOGLOBIN g/dL 10.1* 10.9* 10.3*   PLATELETS 10*3/mm3 179 179 196   ALT (SGPT) U/L  --  10 10   AST (SGOT) U/L  --  18 15   PROBNP pg/mL  --  60.3  --      Lab Results   Component Value Date    TROPONINT 23 (H) 02/26/2024     Results from last 7 days   Lab Units 02/26/24  2142 02/26/24 1953   HSTROP T ng/L 23* 23*         Results from last 7 days   Lab Units 02/26/24  2332 02/26/24  2325   LACTATE mmol/L 0.9 1.1         Results from last 7 days   Lab Units 02/26/24 1953   ADENOVIRUS DETECTION BY PCR  Not Detected   CORONAVIRUS 229E  Not Detected   CORONAVIRUS HKU1  Not Detected   CORONAVIRUS NL63  Not Detected   CORONAVIRUS OC43  Not Detected   HUMAN METAPNEUMOVIRUS  Not Detected   HUMAN RHINOVIRUS/ENTEROVIRUS  Not Detected   INFLUENZA B PCR  Not Detected   PARAINFLUENZA 1  Not Detected   PARAINFLUENZA VIRUS 2  Not Detected   PARAINFLUENZA VIRUS 3  Not Detected   PARAINFLUENZA VIRUS 4  Not Detected   BORDETELLA PERTUSSIS PCR  Not Detected   CHLAMYDOPHILA PNEUMONIAE PCR  Not Detected   MYCOPLAMA PNEUMO PCR  Not Detected   INFLUENZA A PCR  Not Detected   RSV, PCR  Not Detected             Lab Results   Component Value Date    TSH 0.129 (L) 01/23/2024     Estimated Creatinine Clearance: 69.4 mL/min (by C-G formula based on SCr of 0.98 mg/dL).         Imaging:  Imaging Results (Last 24 Hours)       Procedure Component Value Units Date/Time    CT Angiogram Chest Pulmonary Embolism [[id number]] Collected: 02/26/24 2234     Updated: 02/26/24 2243    Narrative:      CT ANGIOGRAM CHEST PULMONARY EMBOLISM    Date of Exam: 2/26/2024 10:05 PM EST    Indication: Pulmonary embolism (PE) suspected, positive D-dimer.    Comparison: CT chest 1/15/2024    Technique: Axial CT images were obtained of the chest after the uneventful intravenous administration of iodinated contrast utilizing pulmonary  embolism protocol.  Sagittal and coronal reconstructions were performed.  Automated exposure control and   iterative reconstruction methods were used.      Findings:  PULMONARY VASCULATURE: There is constrictive occlusion of the distal right main pulmonary artery by obstructive right lung/invasive mediastinal mass. The left pulmonary arteries appear to be widely patent although there is respiratory motion artifact   which limits visualization of the lower lobe subsegmental pulmonary arteries. Main pulmonary artery is normal in size. No evidence of right heart strain.    MEDIASTINUM: There is an enlarging medial right upper lobe lung mass invading the mediastinum with complete constriction of the right mainstem bronchus. This mass now measures 9.1 cm in transverse dimension by 7.1 cm in AP dimension, previously 5.5 x 3.1   cm. There is constriction of the left upper lobe pulmonary vein. Aortic root remains dilated measuring up to 4.1 cm in diameter. There is a focal right anterior pericardial effusion measuring up to 10 mm in thickness.  CORONARY ARTERIES: There is calcified atherosclerotic disease.  LUNGS: There are irregular opacities within the right upper lung likely areas of developing postobstructive pneumonia without dense consolidation. Cavitary lesion within the posterior right upper lobe is similar. There is worsening right middle lobe   atelectasis. There are new tree-in-bud nodules within the right lower lobe, suggestive of infection as well. Left lung is relatively clear.  PLEURAL SPACE: No effusion, mass, nor pneumothorax.  LYMPH NODES: There are no pathologically enlarged lymph nodes.    UPPER ABDOMEN: Unremarkable    OSSEOUS STRUCTURES: Appropriate for age with no acute process identified.      Impression:      Impression:  1.No evidence of pulmonary embolism.  2.Significant enlargement of the left upper lobe pulmonary mass now invading the mediastinum with complete constriction of the right main  pulmonary artery, right main stem bronchus, and right upper lobe pulmonary vein. There are new postobstructive   inflammatory/infectious changes involving the right upper lobe, right middle lobe, and right lower lobe. Correlate for signs of pneumonia.  3.Other stable findings.        Electronically Signed: Corky Abrams MD    2/26/2024 10:41 PM EST    Workstation ID: [id number]    XR Chest 1 View [[id number]] Collected: 02/26/24 1939     Updated: 02/26/24 1943    Narrative:      XR CHEST 1 VW    Date of Exam: 2/26/2024 7:25 PM EST    Indication: cp    Comparison: 1/15/2024 CT and 10/22/2023 radiograph    Findings:  Right chest wall port with distal lead tip overlying the superior vena cava. Unchanged cardiomediastinal silhouette. There are densities overlying the right mid and upper lung which correlate with findings seen on chest CT dated 1/15/2024. There is   possible new airspace opacity in the right lung base. No pleural effusion or pneumothorax. No acute osseous abnormality.      Impression:      Impression:  Chronic changes of the right lung with possible new airspace opacity in the right lung base. This could represent superimposed infection.      Electronically Signed: Baudilio Hastings MD    2/26/2024 7:41 PM EST    Workstation ID: [id number]              Current Meds:   SCHEDULE  budesonide, 0.5 mg, Nebulization, BID - RT  [START ON 2/28/2024] levoFLOXacin, 750 mg, Intravenous, Q24H  potassium chloride, 40 mEq, Oral, Q4H  sodium chloride, 10 mL, Intravenous, Q12H      Infusions  lactated ringers, 50 mL/hr, Last Rate: 50 mL/hr (02/27/24 0635)      PRNs    albuterol    Calcium Replacement - Follow Nurse / BPA Driven Protocol    LORazepam    Magnesium Standard Dose Replacement - Follow Nurse / BPA Driven Protocol    nitroglycerin    ondansetron ODT **OR** ondansetron    Phosphorus Replacement - Follow Nurse / BPA Driven Protocol    Potassium Replacement - Follow Nurse / BPA Driven Protocol    [COMPLETED] Insert  Peripheral IV **AND** sodium chloride    sodium chloride    sodium chloride        Stephanie Torres MD  2/27/2024  07:49 EST      Much of this encounter note is an electronic transcription/translation of spoken language to printed text using Dragon Software.    Electronically signed by Stephanie Torres MD at 02/27/24 1012

## 2024-02-27 NOTE — CASE MANAGEMENT/SOCIAL WORK
Discharge Planning Assessment   John     Patient Name: Romero Maciel  MRN: [id number]  Today's Date: 2/27/2024    Admit Date: 2/26/2024    Plan: Home with family   Discharge Needs Assessment       Row Name 02/27/24 1259       Living Environment    People in Home sibling(s)    Name(s) of People in Home Maral    Current Living Arrangements home    Potentially Unsafe Housing Conditions none    In the past 12 months has the electric, gas, oil, or water company threatened to shut off services in your home? No    Primary Care Provided by self    Provides Primary Care For no one    Family Caregiver if Needed sibling(s)    Quality of Family Relationships supportive    Able to Return to Prior Arrangements yes       Resource/Environmental Concerns    Resource/Environmental Concerns none    Transportation Concerns none       Transportation Needs    In the past 12 months, has lack of transportation kept you from medical appointments or from getting medications? no    In the past 12 months, has lack of transportation kept you from meetings, work, or from getting things needed for daily living? No       Food Insecurity    Within the past 12 months, you worried that your food would run out before you got the money to buy more. Never true    Within the past 12 months, the food you bought just didn't last and you didn't have money to get more. Never true       Transition Planning    Patient/Family Anticipates Transition to home with family    Patient/Family Anticipated Services at Transition none    Transportation Anticipated family or friend will provide       Discharge Needs Assessment    Readmission Within the Last 30 Days no previous admission in last 30 days    Equipment Currently Used at Home walker, rolling;nebulizer    Concerns to be Addressed denies needs/concerns at this time    Anticipated Changes Related to Illness none    Equipment Needed After Discharge none                   Discharge Plan       Katalina Name  02/27/24 1300       Plan    Plan Home with family    Plan Comments Met with patient and family at bedside. Lives at home with Siblings. IADL's PCP and Pharmacy verified, able to afford current medications but has financial concerns related to Health care cost and his cancer treatments. Has applied for Hospital financial assistance. Patient also inquired about getting Screen for Medicaid, med assist notified and will screen. DC barriers: Hem/Onc consult, Pulm Following, IV Steroids                  Continued Care and Services - Admitted Since 2/26/2024    Coordination has not been started for this encounter.       Expected Discharge Date and Time       Expected Discharge Date Expected Discharge Time    Feb 29, 2024            Demographic Summary       Row Name 02/27/24 1258       General Information    Admission Type inpatient    Arrived From emergency department    Referral Source admission list    Reason for Consult discharge planning    Preferred Language English                   Functional Status       Row Name 02/27/24 1259       Functional Status    Usual Activity Tolerance moderate    Current Activity Tolerance fair       Functional Status, IADL    Medications independent    Meal Preparation assistive person    Housekeeping assistive person    Laundry assistive person    Shopping assistive person       Mental Status    General Appearance WDL WDL       Mental Status Summary    Recent Changes in Mental Status/Cognitive Functioning no changes                    Lisa Richards, RN

## 2024-02-27 NOTE — ED PROVIDER NOTES
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  Patient has completed radiation.  He denies any fevers.  Reports some mild nausea.  No vomiting or diarrhea.  No episodes of syncope.  No leg swelling.  Review of Systems   Constitutional:  Negative for chills and fever.   HENT:  Positive for voice change. Negative for trouble swallowing.    Respiratory:  Positive for shortness of breath.    Cardiovascular:  Positive for chest pain. Negative for palpitations and leg swelling.   Gastrointestinal:  Positive for nausea. Negative for abdominal pain, diarrhea and vomiting.   Musculoskeletal:  Positive for back pain. Negative for neck pain.   Skin:  Negative for color change and rash.   Neurological:  Negative for headaches.       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       Allergies   Allergen Reactions    Penicillins Anaphylaxis       Past Surgical History:   Procedure Laterality Date    BRONCHOSCOPY N/A 10/22/2023    Procedure: BRONCHOSCOPY WITH CRYO BIOPSY X1 AREA,  FINE NEEDLE ASPIRATION, AND BRONCHOALVEOLAR LAVAGE;  Surgeon: Genia Jenkins MD;  Location: [facility] ENDOSCOPY;  Service: Pulmonary;  Laterality: N/A;  POST: LUNG MASS       Family History   Problem Relation Age of Onset    Lung cancer Mother          at age 58    Lung cancer Brother          at age 40       Social History     Socioeconomic History    Marital status: Single   Tobacco Use    Smoking status: Former     Packs/day: 1.00     Years: 15.00     Additional pack years: 0.00     Total pack years: 15.00     Types: Cigarettes     Quit date: 10/2023     Years since quittin.4      "Passive exposure: Current    Smokeless tobacco: Current   Vaping Use    Vaping Use: Never used   Substance and Sexual Activity    Alcohol use: Never    Drug use: Never    Sexual activity: Defer           Objective   Physical Exam  Vitals and nursing note reviewed.   Constitutional:       Appearance: He is not toxic-appearing.   HENT:      Head: Normocephalic and atraumatic.   Eyes:      Extraocular Movements: Extraocular movements intact.      Pupils: Pupils are equal, round, and reactive to light.   Cardiovascular:      Rate and Rhythm: Normal rate and regular rhythm.   Pulmonary:      Effort: Pulmonary effort is normal.      Breath sounds: Normal breath sounds.   Abdominal:      General: Bowel sounds are normal.      Palpations: Abdomen is soft.   Musculoskeletal:         General: Normal range of motion.      Cervical back: Normal range of motion and neck supple.      Right lower leg: No tenderness. No edema.      Left lower leg: No tenderness. No edema.   Skin:     General: Skin is warm and dry.      Capillary Refill: Capillary refill takes less than 2 seconds.   Neurological:      General: No focal deficit present.      Mental Status: He is alert and oriented to person, place, and time.         Procedures           ED Course  ED Course as of 02/27/24 0220 Mon Feb 26, 2024 2010 XR Chest 1 View [LB]   2140 Awaiting CT [LB]   2143 IV therapy consulted for placement.  [LB]   2253 EKG sinus rhythm rate of 86.  Compared to previous 10/20/2023.  No acute ST changes.  [facility] ED attending physician. [LB]   2323 Consult to hospitalist for admission.  [LB]      ED Course User Index  [LB] Lashanda Holman, APRN      /71   Pulse 83   Temp 98.4 °F (36.9 °C) (Oral)   Resp 16   Ht 180.3 cm (71\")   Wt 61.2 kg (135 lb)   SpO2 98%   BMI 18.83 kg/m²   Medications   sodium chloride 0.9 % flush 10 mL (has no administration in time range)   levoFLOXacin (LEVAQUIN) 750 mg/150 mL D5W (premix) (LEVAQUIN) " 750 mg (has no administration in time range)   lactated ringers infusion (50 mL/hr Intravenous New Bag 2/27/24 0214)   LORazepam (ATIVAN) tablet 1 mg (has no administration in time range)   albuterol (PROVENTIL) nebulizer solution 0.083% 2.5 mg/3mL (has no administration in time range)   budesonide (PULMICORT) nebulizer solution 0.5 mg (0.5 mg Nebulization Not Given 2/27/24 0139)   sodium chloride 0.9 % flush 10 mL (10 mL Intravenous Given 2/27/24 0215)   sodium chloride 0.9 % flush 10 mL (has no administration in time range)   sodium chloride 0.9 % infusion 40 mL (has no administration in time range)   ondansetron ODT (ZOFRAN-ODT) disintegrating tablet 4 mg (has no administration in time range)     Or   ondansetron (ZOFRAN) injection 4 mg (has no administration in time range)   nitroglycerin (NITROSTAT) SL tablet 0.4 mg (has no administration in time range)   Potassium Replacement - Follow Nurse / BPA Driven Protocol (has no administration in time range)   Magnesium Standard Dose Replacement - Follow Nurse / BPA Driven Protocol (has no administration in time range)   Phosphorus Replacement - Follow Nurse / BPA Driven Protocol (has no administration in time range)   Calcium Replacement - Follow Nurse / BPA Driven Protocol (has no administration in time range)   morphine injection 4 mg (4 mg Intravenous Given 2/26/24 1945)   ondansetron (ZOFRAN) injection 4 mg (4 mg Intravenous Given 2/26/24 1945)   iopamidol (ISOVUE-370) 76 % injection 100 mL (100 mL Intravenous Given 2/26/24 2225)   levoFLOXacin (LEVAQUIN) 750 mg/150 mL D5W (premix) (LEVAQUIN) 750 mg (0 mg Intravenous Stopped 2/27/24 0136)     CT Angiogram Chest Pulmonary Embolism    Result Date: 2/26/2024  Impression: 1.No evidence of pulmonary embolism. 2.Significant enlargement of the left upper lobe pulmonary mass now invading the mediastinum with complete constriction of the right main pulmonary artery, right main stem bronchus, and right upper lobe pulmonary  vein. There are new postobstructive inflammatory/infectious changes involving the right upper lobe, right middle lobe, and right lower lobe. Correlate for signs of pneumonia. 3.Other stable findings. Electronically Signed: Corky Abrams MD  2/26/2024 10:41 PM EST  Workstation ID: [id number]    XR Chest 1 View    Result Date: 2/26/2024  Impression: Chronic changes of the right lung with possible new airspace opacity in the right lung base. This could represent superimposed infection. Electronically Signed: Baudilio Hastings MD  2/26/2024 7:41 PM EST  Workstation ID: [id number]   Lab Results (last 24 hours)       Procedure Component Value Units Date/Time    Respiratory Panel PCR w/COVID-19(SARS-CoV-2) KEESHA/CHAZ/JOSE/PAD/COR/PEDRO In-House, NP Swab in UTM/VTM, 2 HR TAT - Swab, Nasopharynx [[id number]]  (Normal) Collected: 02/26/24 1953    Specimen: Swab from Nasopharynx Updated: 02/26/24 2101     ADENOVIRUS, PCR Not Detected     Coronavirus 229E Not Detected     Coronavirus HKU1 Not Detected     Coronavirus NL63 Not Detected     Coronavirus OC43 Not Detected     COVID19 Not Detected     Human Metapneumovirus Not Detected     Human Rhinovirus/Enterovirus Not Detected     Influenza A PCR Not Detected     Influenza B PCR Not Detected     Parainfluenza Virus 1 Not Detected     Parainfluenza Virus 2 Not Detected     Parainfluenza Virus 3 Not Detected     Parainfluenza Virus 4 Not Detected     RSV, PCR Not Detected     Bordetella pertussis pcr Not Detected     Bordetella parapertussis PCR Not Detected     Chlamydophila pneumoniae PCR Not Detected     Mycoplasma pneumo by PCR Not Detected    Narrative:      In the setting of a positive respiratory panel with a viral infection PLUS a negative procalcitonin without other underlying concern for bacterial infection, consider observing off antibiotics or discontinuation of antibiotics and continue supportive care. If the respiratory panel is positive for atypical bacterial infection (Bordetella  pertussis, Chlamydophila pneumoniae, or Mycoplasma pneumoniae), consider antibiotic de-escalation to target atypical bacterial infection.    CBC & Differential [[id number]]  (Abnormal) Collected: 02/26/24 1953    Specimen: Blood Updated: 02/26/24 1957    Narrative:      The following orders were created for panel order CBC & Differential.  Procedure                               Abnormality         Status                     ---------                               -----------         ------                     CBC Auto Differential[[id number]]        Abnormal            Final result                 Please view results for these tests on the individual orders.    Comprehensive Metabolic Panel [[id number]]  (Abnormal) Collected: 02/26/24 1953    Specimen: Blood Updated: 02/26/24 2020     Glucose 117 mg/dL      BUN 6 mg/dL      Creatinine 0.94 mg/dL      Sodium 138 mmol/L      Potassium 3.8 mmol/L      Chloride 99 mmol/L      CO2 27.0 mmol/L      Calcium 9.6 mg/dL      Total Protein 7.0 g/dL      Albumin 3.6 g/dL      ALT (SGPT) 10 U/L      AST (SGOT) 18 U/L      Alkaline Phosphatase 61 U/L      Total Bilirubin 0.2 mg/dL      Globulin 3.4 gm/dL      A/G Ratio 1.1 g/dL      BUN/Creatinine Ratio 6.4     Anion Gap 12.0 mmol/L      eGFR 92.8 mL/min/1.73     Narrative:      GFR Normal >60  Chronic Kidney Disease <60  Kidney Failure <15      High Sensitivity Troponin T [[id number]]  (Abnormal) Collected: 02/26/24 1953    Specimen: Blood Updated: 02/26/24 2020     HS Troponin T 23 ng/L     Narrative:      High Sensitive Troponin T Reference Range:  <14.0 ng/L- Negative Female for AMI  <22.0 ng/L- Negative Male for AMI  >=14 - Abnormal Female indicating possible myocardial injury.  >=22 - Abnormal Male indicating possible myocardial injury.   Clinicians would have to utilize clinical acumen, EKG, Troponin, and serial changes to determine if it is an Acute Myocardial Infarction or myocardial injury due to an underlying chronic  condition.         CBC Auto Differential [[id number]]  (Abnormal) Collected: 02/26/24 1953    Specimen: Blood Updated: 02/26/24 1957     WBC 5.30 10*3/mm3      RBC 3.24 10*6/mm3      Hemoglobin 10.9 g/dL      Hematocrit 33.5 %      .3 fL      MCH 33.6 pg      MCHC 32.5 g/dL      RDW 17.4 %      RDW-SD 63.0 fl      MPV 7.6 fL      Platelets 179 10*3/mm3      Neutrophil % 73.9 %      Lymphocyte % 11.5 %      Monocyte % 8.1 %      Eosinophil % 5.8 %      Basophil % 0.7 %      Neutrophils, Absolute 3.90 10*3/mm3      Lymphocytes, Absolute 0.60 10*3/mm3      Monocytes, Absolute 0.40 10*3/mm3      Eosinophils, Absolute 0.30 10*3/mm3      Basophils, Absolute 0.00 10*3/mm3      nRBC 0.1 /100 WBC     BNP [[id number]]  (Normal) Collected: 02/26/24 1953    Specimen: Blood Updated: 02/26/24 2020     proBNP 60.3 pg/mL     Narrative:      This assay is used as an aid in the diagnosis of individuals suspected of having heart failure. It can be used as an aid in the diagnosis of acute decompensated heart failure (ADHF) in patients presenting with signs and symptoms of ADHF to the emergency department (ED). In addition, NT-proBNP of <300 pg/mL indicates ADHF is not likely.    Age Range Result Interpretation  NT-proBNP Concentration (pg/mL:      <50             Positive            >450                   Gray                 300-450                    Negative             <300    50-75           Positive            >900                  Gray                300-900                  Negative            <300      >75             Positive            >1800                  Gray                300-1800                  Negative            <300    D-dimer, Quantitative [[id number]]  (Abnormal) Collected: 02/26/24 1953    Specimen: Blood Updated: 02/26/24 2009     D-Dimer, Quantitative 1.57 mg/L (FEU)     Narrative:      According to the assay 's published package insert, a normal (<0.50 mg/L (FEU)) D-dimer result in  "conjunction with a non-high clinical probability assessment, excludes deep vein thrombosis (DVT) and pulmonary embolism (PE) with high sensitivity.    D-dimer values increase with age and this can make VTE exclusion of an older population difficult. To address this, the American College of Phys[employer], based on best available evidence and recent guidelines, recommends that clinicians use age-adjusted D-dimer thresholds in patients greater than 50 years of age with: a) a low probability of PE who do not meet all Pulmonary Embolism Rule Out Criteria, or b) in those with intermediate probability of PE.   The formula for an age-adjusted D-dimer cut-off is \"age/100\".  For example, a 60 year old patient would have an age-adjusted cut-off of 0.60 mg/L (FEU) and an 80 year old 0.80 mg/L (FEU).    High Sensitivity Troponin T 2Hr [[id number]]  (Abnormal) Collected: 02/26/24 2142    Specimen: Blood Updated: 02/26/24 2203     HS Troponin T 23 ng/L      Troponin T Delta 0 ng/L     Narrative:      High Sensitive Troponin T Reference Range:  <14.0 ng/L- Negative Female for AMI  <22.0 ng/L- Negative Male for AMI  >=14 - Abnormal Female indicating possible myocardial injury.  >=22 - Abnormal Male indicating possible myocardial injury.   Clinicians would have to utilize clinical acumen, EKG, Troponin, and serial changes to determine if it is an Acute Myocardial Infarction or myocardial injury due to an underlying chronic condition.         Blood Culture - Blood, Arm, Right [[id number]] Collected: 02/26/24 2325    Specimen: Blood from Arm, Right Updated: 02/26/24 2331    Blood Culture - Blood, Arm, Right [[id number]] Collected: 02/26/24 2325    Specimen: Blood from Arm, Right Updated: 02/26/24 2331    Lactic Acid, Plasma [[id number]]  (Normal) Collected: 02/26/24 2325    Specimen: Blood Updated: 02/27/24 0001     Lactate 1.1 mmol/L     POC Lactate [[id number]]  (Normal) Collected: 02/26/24 2332    Specimen: Blood Updated: 02/26/24 2334     " Lactate 0.9 mmol/L      Comment: Serial Number: [id number]:  [id number]                                                    Medical Decision Making  Problems Addressed:  Lung mass: complicated acute illness or injury  Pneumonia due to infectious organism, unspecified laterality, unspecified part of lung: complicated acute illness or injury    Amount and/or Complexity of Data Reviewed  Labs: ordered.  Radiology: ordered. Decision-making details documented in ED Course.  ECG/medicine tests: ordered.    Risk  Prescription drug management.  Decision regarding hospitalization.    Chart review oncology note 2/13/2024.  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    Labs: RVP negative.  Troponin 23.  BNP normal.  CMP reviewed CBC reviewed.  D-dimer elevated, CT was obtained  Imaging: CT Angiogram Chest Pulmonary Embolism    Result Date: 2/26/2024  Impression: 1.No evidence of pulmonary embolism. 2.Significant enlargement of the left upper lobe pulmonary mass now invading the mediastinum with complete constriction of the right main pulmonary artery, right main stem bronchus, and right upper lobe pulmonary vein. There are new postobstructive inflammatory/infectious changes involving the right upper lobe, right middle lobe, and right lower lobe. Correlate for signs of pneumonia. 3.Other stable findings. Electronically Signed: Corky Abrams MD  2/26/2024 10:41 PM EST  Workstation ID: [id number]    XR Chest 1 View    Result Date: 2/26/2024  Impression: Chronic changes of the right lung with possible new airspace opacity in the right lung base. This could represent superimposed infection. Electronically Signed: Baudilio Hastings MD  2/26/2024 7:41 PM EST  Workstation ID: [id number]   Differential diagnoses considered for patient presentation, this list is not all inclusive of diagnoses considered: Pneumonia, PE, acute coronary syndrome.    Patient presents to the ED for the above complaint, underwent the above, exam and workup.   Placed on appropriate monitoring.  Patient's workup in the ED reveals enlarging mass, also possible pneumonia, given patient's loss of voice, pain will place on Levaquin.  He does have allergy of anaphylaxis to penicillin.  Placed on Levaquin.  Patient will be admitted to hospital service for further evaluation management  Discussion/Consultation with other providers: Hospitalist for admission  Disposition: I discussed with the patient their test results, work-up here in the emergency department, and need for admission and further evaluation. Patient is agreeable to the plan of care. At time of disposition patient's VS are reviewed, and patient without acute distress.  Opportunity was provided for questions at the bedside, all questions and concerns were addressed.  Note Disclaimer: At [facility], we believe that sharing information builds trust and better relationships. You are receiving this note because you recently visited [facility]. It is possible you will see health information before a provider has talked with you about it. This kind of information can be easy to misunderstand. To help you fully understand what it means for your health, we urge you to discuss this note with your provider.Note dictated utilizing Dragon Dictation. Appropriate PPE worn during patient interactions.        Final diagnoses:   Pneumonia due to infectious organism, unspecified laterality, unspecified part of lung   Lung mass       ED Disposition  ED Disposition       ED Disposition   Decision to Admit    Condition   --    Comment   Level of Care: Telemetry [5]   Admitting Physician: AME LAIRD [[id number]]   Attending Physician: AME LAIRD [[id number]]                 No follow-up provider specified.       Medication List      No changes were made to your prescriptions during this visit.            Lashanda Holman, APRN  02/27/24 [id number]

## 2024-02-27 NOTE — PLAN OF CARE
Goal Outcome Evaluation:           Progress: no change  Outcome Evaluation: 59 y.o. male with PMHx of small cell lung cancer with mets to brain presented to [facility] for dyspnea and loss of voice.  Patient states this afternoon he became dyspneic, was  unable to cough, and lost his voice with slight chest discomfort.  . PLOF is (I) with dressing, Alexey for bathing for setup from family, and Alexey for ambulation around home for short distances. This date pt reports not feeling well, though agrees to therapy. He tolerated bed mobility and standing at EOB well wiht CGA.  Recommend return home with 24 hour care and HHPT.      Anticipated Discharge Disposition (OT): home with home health

## 2024-02-27 NOTE — PLAN OF CARE
Goal Outcome Evaluation:  Plan of Care Reviewed With: patient        Progress: no change  Outcome Evaluation: 59 y.o. male with PMHx of small cell lung cancer with mets to brain presented to [facility] for dyspnea complicated by loss of voice.  Patient states this afternoon he became dyspneic, was  unable to cough, and lost his voice with slight chest discomfort.  . PLOF is (I) with dressing, Alexey for bathing for setup from family, and Alexey for ambulation around home for short distances. His older brother or younger sister are with him 24/7 at home. Today he required CGA for bed mobility and transfers, and CGA for 2 ft ambulation to reposition in bed. Recommend home with HHPT for endurance training at discharge.      Anticipated Discharge Disposition (PT): home with home health

## 2024-02-28 ENCOUNTER — TREATMENT (OUTPATIENT)
Dept: RADIATION ONCOLOGY | Facility: HOSPITAL | Age: 59
End: 2024-02-28
Payer: COMMERCIAL

## 2024-02-28 ENCOUNTER — HOSPITAL ENCOUNTER (OUTPATIENT)
Dept: RADIATION ONCOLOGY | Facility: HOSPITAL | Age: 59
Setting detail: RADIATION/ONCOLOGY SERIES
End: 2024-02-28
Payer: COMMERCIAL

## 2024-02-28 DIAGNOSIS — C79.31 METASTASIS TO BRAIN: ICD-10-CM

## 2024-02-28 DIAGNOSIS — C34.90 SMALL CELL LUNG CANCER: Primary | ICD-10-CM

## 2024-02-28 DIAGNOSIS — R91.8 LUNG MASS: ICD-10-CM

## 2024-02-28 PROCEDURE — 77334 RADIATION TREATMENT AID(S): CPT | Performed by: RADIOLOGY

## 2024-02-28 NOTE — PAYOR COMM NOTE
"This is discharge notification for Jake Maciel   Reference/Auth # [id number]   Pt left AMA on 2/27/24.    MOOSE Masters, RN, [facility]  Utilization Review Nurse  [facility]  Direct & confidential phone # [phone]  Fax # [phone]        Jake Maciel \"Pat\" (59 y.o. Male)       Date of Birth   1965    Social Security Number       Address   [address] [facility] [place] [address]    Home Phone   [phone]    MRN   [id number]       Faith   Sikh    Marital Status   Single                            Admission Date   2/26/24    Admission Type   Emergency    Admitting Provider   Mele Fuentes DO    Attending Provider       Department, Room/Bed   [facility] EMERGENCY DEPARTMENT, 14/14       Discharge Date   2/27/2024    Discharge Disposition   Left Against Medical Advice    Discharge Destination   Home                              Attending Provider: (none)   Allergies: Penicillins    Isolation: None   Infection: None   Code Status: Prior    Ht: 180.3 cm (71\")   Wt: 61.2 kg (135 lb)    Admission Cmt: None   Principal Problem: Dyspnea [R06.00]                   Active Insurance as of 2/26/2024       Primary Coverage       Payor Plan Insurance Group Employer/Plan Group    [employer]UE [employer] PPO [id number]       Payor Plan Address Payor Plan Phone Number Payor Plan Fax Number Effective Dates    [address] [phone]  1/10/2022 - None Entered    [place] [id number]         Subscriber Name Subscriber Birth Date Member ID       JAKE MACIEL ROSINA 1965 [id number]                     Emergency Contacts        (Rel.) Home Phone Work Phone Mobile Phone    Suhas Maciel (Brother) -- -- [phone]    Vaishnavi Maciel (Sister) -- -- [phone]              Discharge Summary    No notes of this type exist for this encounter.       "

## 2024-02-28 NOTE — DISCHARGE SUMMARY
[facility] Medicine Services  Discharge Summary    Date of Service: 24  Patient Name: Romero Maciel  : 1965  MRN: [id number]    Date of Admission: 2024  Date of Discharge:  24  Primary Care Physician: Jennifer Maynard APRN    Discharge Diagnosis: dyspnea complicated by voice loss     Presenting Problem:   Dyspnea [R06.00]    Active and Resolved Hospital Problems:  Active Hospital Problems    Diagnosis POA    **Dyspnea [R06.00] Yes      Resolved Hospital Problems   No resolved problems to display.                Hospital Course:     Romero Maciel is a 59 y.o. male who presented to [facility] on 2024 with complaints of dyspnea complicated by voice loss.  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  Patient stated that the afternoon of presentation he became dyspneic and was unable to cough this was accompanied by loss of voice and slight chest discomfort.  Denied any numbness, tingling, facial droop, slurred speech, fever, chills.  Actively receiving immunotherapy after completion of 4 cycles of chemotherapy and radiation to brain met.     2024: CT PE protocol  -Negative for PE  -Significant enlargement of the left upper lobe pulmonary mass no invading the mediastinum with complete constriction of the right main pulmonary artery, right mainstem bronchus, and right upper lobe pulmonary vein.  New postobstructive inflammatory/infectious changes involving the right upper lobe, right middle lobe, right lower lobe     24  Hematology/Oncology was consulted on a patient known to us and established in the office with Dr. Rubio for his diagnosis of extensive stage small cell lung cancer with osseous metastasis and brain metastasis status post 4 cycles of chemoimmunotherapy and now on immunotherapy only.    59-year-old male with a known history of extensive stage small cell lung cancer that presented with voice changes and  "dyspnea and was found to have significant progression in the left upper lobe mass.    Extensive stage small cell lung cancer  Received 4 cycles of chemoimmunotherapy with carboplatin, etoposide and atezolizumab, completed 1/4/2024  Currently on maintenance atezolizumab, last treatment 2/22/2024  Started brain radiation on 2/6/2024 due to interval enlargement in the right occipital lobe lesion  Now with significant progression in the left upper lobe causing near complete obstruction, pulmonology following and radiation oncology consulted  Needs palliative XRT to the left upper lobe mass  Plan is for treatment change post XRT.  Options include lurbinectedin, Taxotere, topotecan.    Patient signed AGAINST MEDICAL ADVICE on February 27 and was not examined by this hospitalist prior to leaving.      Discharge Exam    /71   Pulse 97   Temp 97.9 °F (36.6 °C) (Oral)   Resp 20   Ht 180.3 cm (71\")   Wt 61.2 kg (135 lb)   SpO2 96%   BMI 18.83 kg/m²     Physical exam was not done because the patient signed AGAINST MEDICAL ADVICE    Patient signed AMA.    CODE STATUS:  Code Status and Medical Interventions:   Ordered at: 02/27/24 0133     Code Status (Patient has no pulse and is not breathing):    CPR (Attempt to Resuscitate)     Medical Interventions (Patient has pulse or is breathing):    Full Support         Future Appointments   Date Time Provider Department Center   3/5/2024 11:00 AM JOSE CC PET BH JOSE PET JOSE   3/13/2024  2:40 PM VITALS ONLY ONC LAB NA BH LAG ONAL JOSE   3/13/2024  2:45 PM Jeanette Rubio MD [facility] ONC NA JOSE   3/13/2024  3:00 PM Vineet Fink MD [facility] RO JOSE None   3/14/2024 11:00 AM INF ROOM 30 - CHAIR JOSE BH LAG CC NA LAG   4/4/2024 11:00 AM INF ROOM 34 - CHAIR/BED JOSE BH LAG CC NA LAG         Time spent on Discharge including face to face service:  >30 minutes    Signature: Electronically signed by Pedro Pablo Contreras MD, 02/28/24, 17:16 EST.  [facility]ist Team      "

## 2024-02-28 NOTE — PLAN OF CARE
Problem: Adult Inpatient Plan of Care  Goal: Plan of Care Review  Outcome: Ongoing, Progressing  Flowsheets (Taken 2/27/2024 1931)  Progress: improving  Plan of Care Reviewed With:   patient   sibling   Goal Outcome Evaluation:  Plan of Care Reviewed With: patient, sibling        Progress: improving

## 2024-02-29 PROCEDURE — 77300 RADIATION THERAPY DOSE PLAN: CPT | Performed by: RADIOLOGY

## 2024-02-29 PROCEDURE — 77301 RADIOTHERAPY DOSE PLAN IMRT: CPT | Performed by: RADIOLOGY

## 2024-02-29 PROCEDURE — 77338 DESIGN MLC DEVICE FOR IMRT: CPT | Performed by: RADIOLOGY

## 2024-02-29 PROCEDURE — 77293 RESPIRATOR MOTION MGMT SIMUL: CPT | Performed by: RADIOLOGY

## 2024-02-29 NOTE — PROGRESS NOTES
Radiation Oncology Consult Note    Name: Romero Maciel  YOB: 1965  MRN #: [id number]  Date of Service: 2/29/2024  Referring Provider: No referring provider defined for this encounter.  Primary Care Provider: Jennifer Maynard APRN        DIAGNOSIS: Extensive Stage Small Cell Carcinoma With Brain Metastasis and Poor Response to Chemo    CHIEF COMPLAINT: Mild headache                                 REQUESTING PHYSICIAN:  Jeanette Rubio MD    RECORDS OBTAINED:  Records of the patients history including those obtained from the referring provider were reviewed and summarized in detail.    HISTORY OF PRESENT ILLNESS:  Romero Maciel is a 59 y.o. male Romero Maciel is a 59 y.o. male who presented to [facility] on 2/26/2024 with complaints of dyspnea complicated by voice loss.  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  Patient stated that the afternoon of presentation he became dyspneic and was unable to cough this was accompanied by loss of voice and slight chest discomfort.  Denied any numbness, tingling, facial droop, slurred speech, fever, chills.  Actively receiving immunotherapy after completion of 4 cycles of chemotherapy and radiation to brain met.  Patient recently evaluated with repeat CT chest with findings as follows:    2/26/2024: CT PE protocol  -Negative for PE  -Significant enlargement of the left upper lobe pulmonary mass no invading the mediastinum with complete constriction of the right main pulmonary artery, right mainstem bronchus, and right upper lobe pulmonary vein.  New postobstructive inflammatory/infectious changes involving the right upper lobe, right middle lobe, right lower lob    As a result, Mr Maciel was referred for palliative XRT to the right lung and hilum.  The following portions of the patient's history were reviewed and updated as appropriate: allergies, current medications, past family history, past medical history, past  social history, past surgical history and problem list. Reviewed with the patient and remain unchanged.    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  MEDICATIONS:   Current Outpatient Medications:     acetaminophen (TYLENOL) 325 MG tablet, Take 2 tablets by mouth Every 6 (Six) Hours As Needed for Mild Pain., Disp: , Rfl:     albuterol sulfate  (90 Base) MCG/ACT inhaler, Inhale 2 puffs Every 4 (Four) Hours As Needed for Wheezing., Disp: 18 g, Rfl: 0    budesonide (PULMICORT) 0.5 MG/2ML nebulizer solution, Take 2 mL by nebulization 2 (Two) Times a Day., Disp: 120 mL, Rfl: 0    clonazePAM (KlonoPIN) 1 MG tablet, Take 1 tablet by mouth 3 (Three) Times a Day As Needed., Disp: , Rfl:     doxepin (SINEquan) 75 MG capsule, Take 1 capsule by mouth Every Night., Disp: , Rfl:     ibuprofen (ADVIL,MOTRIN) 200 MG tablet, Take 1 tablet by mouth Every 6 (Six) Hours As Needed for Mild Pain., Disp: , Rfl:     ipratropium-albuterol (DUO-NEB) 0.5-2.5 mg/3 ml nebulizer, Take 3 mL by nebulization Every 4 (Four) Hours As Needed for Wheezing., Disp: 180 mL, Rfl: 0    lidocaine-prilocaine (EMLA) 2.5-2.5 % cream, Apply 1 application  topically to the appropriate area as directed See Admin Instructions. Apply to port site one hour prior to port being accessed., Disp: 30 g, Rfl: 3    mirtazapine (REMERON) 45 MG tablet, Take 1 tablet by mouth Every Night., Disp: , Rfl:     ondansetron (ZOFRAN) 8 MG tablet, Take 1 tablet by mouth 3 (Three) Times a Day As Needed for Nausea or Vomiting., Disp: 30 tablet, Rfl: 5  ALLERGIES:   Allergies   Allergen Reactions    Penicillins Anaphylaxis     PAST SURGICAL HISTORY: he has a past surgical history that includes Bronchoscopy (N/A, 10/22/2023).  PREVIOUS RADIOTHERAPY OR CHEMOTHERAPY: Yes  FAMILY HISTORY: his family history includes Lung cancer in his brother and mother.  SOCIAL HISTORY: he  reports that he quit smoking about 4  "months ago. His smoking use included cigarettes. He has a 15.00 pack-year smoking history. He has been exposed to tobacco smoke. He uses smokeless tobacco. He reports that he does not drink alcohol and does not use drugs.  PAIN AND PAIN MANAGEMENT: There were no vitals filed for this visit.  NUTRITIONAL STATUS:  Otherwise no issues  KPS: 80:  Normal activity with effort; some signs or symptoms  ECOG: (1) Restricted in physically strenuous activity, ambulatory and able to do work of light nature       PHQ-9 Total Score:       Review of Systems:   Review of Systems   Constitutional:  Positive for activity change and fatigue.   Eyes: Negative.    Respiratory:  Positive for shortness of breath.    Cardiovascular: Negative.    Gastrointestinal: Negative.    Endocrine: Negative.    Genitourinary: Negative.    Musculoskeletal: Negative.    Skin: Negative.    Allergic/Immunologic: Negative.    Neurological:  Positive for headaches.   Hematological: Negative.    Psychiatric/Behavioral: Negative.            Objective     Vitals:  Vital Signs:   /66 (BP Location: Left arm, Patient Position: Lying)   Pulse 90   Temp 97.9 °F (36.6 °C) (Oral)   Resp 20   Ht 180.3 cm (71\")   Wt 61.2 kg (135 lb)   SpO2 94%   BMI 18.83 kg/m²       PHYSICAL EXAM:  Physical Exam  Constitutional:       Appearance: Normal appearance.   HENT:      Head: Normocephalic and atraumatic.      Nose: Nose normal.      Mouth/Throat:      Mouth: Mucous membranes are moist.   Eyes:      Extraocular Movements: Extraocular movements intact.      Conjunctiva/sclera: Conjunctivae normal.      Pupils: Pupils are equal, round, and reactive to light.   Cardiovascular:      Rate and Rhythm: Normal rate and regular rhythm.   Pulmonary:      Effort: Pulmonary effort is normal.      Breath sounds: Normal breath sounds.      Comments: Moving air in all quadrants  Abdominal:      General: Abdomen is flat. Bowel sounds are normal.      Palpations: Abdomen is soft. "   Skin:     General: Skin is warm.   Neurological:      General: No focal deficit present.      Mental Status: He is alert.   Psychiatric:         Mood and Affect: Mood normal.         Behavior: Behavior normal.          PERTINENT IMAGING/PATHOLOGY/LABS (Medical Decision Making):     COORDINATION OF CARE: A copy of this note is sent to the referring provider.    PATHOLOGY (Reviewed):     IMAGING (Reviewed):     LABS (Reviewed):  Hematology WBC   Date Value Ref Range Status   02/27/2024 4.70 3.40 - 10.80 10*3/mm3 Final     RBC   Date Value Ref Range Status   02/27/2024 3.04 (L) 4.14 - 5.80 10*6/mm3 Final     Hemoglobin   Date Value Ref Range Status   02/27/2024 10.1 (L) 13.0 - 17.7 g/dL Final     Hematocrit   Date Value Ref Range Status   02/27/2024 31.2 (L) 37.5 - 51.0 % Final     Platelets   Date Value Ref Range Status   02/27/2024 179 140 - 450 10*3/mm3 Final      Chemistry   Lab Results   Component Value Date    GLUCOSE 110 (H) 02/27/2024    BUN 5 (L) 02/27/2024    CREATININE 0.98 02/27/2024    BCR 5.1 (L) 02/27/2024    K 3.4 (L) 02/27/2024    CO2 30.0 (H) 02/27/2024    CALCIUM 9.4 02/27/2024    ALBUMIN 3.6 02/26/2024    AST 18 02/26/2024    ALT 10 02/26/2024       Assessment & Plan     ASSESSMENT :  Diagnoses and all orders for this visit:    1. Small cell lung cancer (Primary)    2. Metastasis to brain    3. Lung mass         PLAN:  We will proceed with CT simulation and palliative XRT to the right lung and enlarged nodes.  The current plan is to proceed with a total of 40 Gray over 16 fractions as long as her regional dose constraints could be met.  Specifically, the esophagus is an organ of concern in the situation.  Various side effects and complications associated with localized radiotherapy to the right lung and hilum are discussed in detail with Mr. Maciel and he understands accepts these things.  Discussions were held in the presence of his wife and brother as well.  He may well expect esophagitis  difficulty swallowing, shortness of breath, lethargy and possible weight loss.          I spent 40 minutes caring for Romero on this date of service. This time includes time spent by me in the following activities:preparing for the visit, reviewing tests, obtaining and/or reviewing a separately obtained history, performing a medically appropriate examination and/or evaluation , ordering medications, tests, or procedures, referring and communicating with other health care professionals , and independently interpreting results and communicating that information with the patient/family/caregiver       CC: No ref. provider found Jennifer Maynard APRN Mark R. Jones, MD  2/29/2024  8:23 AM EST

## 2024-03-01 ENCOUNTER — HOSPITAL ENCOUNTER (OUTPATIENT)
Dept: RADIATION ONCOLOGY | Facility: HOSPITAL | Age: 59
Setting detail: RADIATION/ONCOLOGY SERIES
End: 2024-03-01
Payer: COMMERCIAL

## 2024-03-02 LAB
BACTERIA SPEC AEROBE CULT: NORMAL
BACTERIA SPEC AEROBE CULT: NORMAL

## 2024-03-05 ENCOUNTER — HOSPITAL ENCOUNTER (OUTPATIENT)
Dept: RADIATION ONCOLOGY | Facility: HOSPITAL | Age: 59
Discharge: HOME | End: 2024-03-05

## 2024-03-05 LAB
RAD ONC ARIA COURSE ID: NORMAL
RAD ONC ARIA COURSE INTENT: NORMAL
RAD ONC ARIA COURSE LAST TREATMENT DATE: NORMAL
RAD ONC ARIA COURSE START DATE: NORMAL
RAD ONC ARIA COURSE TREATMENT ELAPSED DAYS: 0
RAD ONC ARIA FIRST TREATMENT DATE: NORMAL
RAD ONC ARIA PLAN FRACTIONS TREATED TO DATE: 1
RAD ONC ARIA PLAN ID: NORMAL
RAD ONC ARIA PLAN PRESCRIBED DOSE PER FRACTION: 2.5 GY
RAD ONC ARIA PLAN PRIMARY REFERENCE POINT: NORMAL
RAD ONC ARIA PLAN TOTAL FRACTIONS PRESCRIBED: 16
RAD ONC ARIA PLAN TOTAL PRESCRIBED DOSE: 4000 CGY
RAD ONC ARIA REFERENCE POINT DOSAGE GIVEN TO DATE: 2.5 GY
RAD ONC ARIA REFERENCE POINT ID: NORMAL
RAD ONC ARIA REFERENCE POINT SESSION DOSAGE GIVEN: 2.5 GY

## 2024-03-05 PROCEDURE — 77427 RADIATION TX MANAGEMENT X5: CPT | Performed by: RADIOLOGY

## 2024-03-05 PROCEDURE — 77386: CPT | Performed by: RADIOLOGY

## 2024-03-05 PROCEDURE — 77014 CHG CT GUIDANCE RADIATION THERAPY FLDS PLACEMENT: CPT | Performed by: RADIOLOGY

## 2024-03-06 ENCOUNTER — RADIATION ONCOLOGY WEEKLY ASSESSMENT (OUTPATIENT)
Dept: RADIATION ONCOLOGY | Facility: HOSPITAL | Age: 59
End: 2024-03-06
Payer: COMMERCIAL

## 2024-03-06 ENCOUNTER — HOSPITAL ENCOUNTER (OUTPATIENT)
Dept: RADIATION ONCOLOGY | Facility: HOSPITAL | Age: 59
Discharge: HOME | End: 2024-03-06

## 2024-03-06 VITALS
HEIGHT: 69 IN | OXYGEN SATURATION: 98 % | HEART RATE: 104 BPM | RESPIRATION RATE: 18 BRPM | DIASTOLIC BLOOD PRESSURE: 75 MMHG | WEIGHT: 147.4 LBS | TEMPERATURE: 98 F | BODY MASS INDEX: 21.83 KG/M2 | SYSTOLIC BLOOD PRESSURE: 107 MMHG

## 2024-03-06 DIAGNOSIS — C34.90 SMALL CELL LUNG CANCER: ICD-10-CM

## 2024-03-06 DIAGNOSIS — C79.31 METASTASIS TO BRAIN: ICD-10-CM

## 2024-03-06 DIAGNOSIS — R91.8 LUNG MASS: Primary | ICD-10-CM

## 2024-03-06 LAB
RAD ONC ARIA COURSE ID: NORMAL
RAD ONC ARIA COURSE INTENT: NORMAL
RAD ONC ARIA COURSE LAST TREATMENT DATE: NORMAL
RAD ONC ARIA COURSE START DATE: NORMAL
RAD ONC ARIA COURSE TREATMENT ELAPSED DAYS: 1
RAD ONC ARIA FIRST TREATMENT DATE: NORMAL
RAD ONC ARIA PLAN FRACTIONS TREATED TO DATE: 1
RAD ONC ARIA PLAN ID: NORMAL
RAD ONC ARIA PLAN PRESCRIBED DOSE PER FRACTION: 2.5 GY
RAD ONC ARIA PLAN PRIMARY REFERENCE POINT: NORMAL
RAD ONC ARIA PLAN TOTAL FRACTIONS PRESCRIBED: 15
RAD ONC ARIA PLAN TOTAL PRESCRIBED DOSE: 3750 CGY
RAD ONC ARIA REFERENCE POINT DOSAGE GIVEN TO DATE: 5 GY
RAD ONC ARIA REFERENCE POINT ID: NORMAL
RAD ONC ARIA REFERENCE POINT SESSION DOSAGE GIVEN: 2.5 GY

## 2024-03-06 PROCEDURE — 77338 DESIGN MLC DEVICE FOR IMRT: CPT | Performed by: RADIOLOGY

## 2024-03-06 PROCEDURE — 77300 RADIATION THERAPY DOSE PLAN: CPT | Performed by: RADIOLOGY

## 2024-03-06 PROCEDURE — 77014 CHG CT GUIDANCE RADIATION THERAPY FLDS PLACEMENT: CPT | Performed by: RADIOLOGY

## 2024-03-06 PROCEDURE — 77386: CPT | Performed by: RADIOLOGY

## 2024-03-06 NOTE — PROGRESS NOTES
[facility] RADIATION ONCOLOGY  ON-TREATMENT VISIT NOTE    NAME: Romero Maciel  YOB: 1965  MRN #: [id number]  DATE OF SERVICE: 3/6/2024  PRESCRIBING PHYSICIAN: Vineet Fink MD    DIAGNOSIS:      ICD-10-CM ICD-9-CM   1. Lung mass  R91.8 786.6   2. Metastasis to brain  C79.31 198.3   3. Small cell lung cancer  C34.90 162.9      RADIATION THERAPY VISIT:  Continue radiation therapy, Dosimetry plan remains acceptable, Films reviewed and remains acceptable, Pain assessed, Pain management planned, Radiation dose schedule reviewed and remains acceptable, Radiation technique remains acceptable, and Symptoms within expected range    Radiation Treatments       Active   Plans   RtLung   Most recent treatment: Dose planned: 250 cGy (fraction 1 on 3/5/2024)   Total: Dose planned: 4,000 cGy (16 fractions)   Elapsed Days: 0      Reference Points   GARCIA WhBrain   Most recent treatment: Dose given: -- (on 2/19/2024)   Total: Dose given: 3,000 cGy   Elapsed Days: 13      RtLung   Most recent treatment: Dose given: 250 cGy (on 3/5/2024)   Total: Dose given: 250 cGy   Elapsed Days: 0                    [] Concurrent Chemo   Regimen:      PHYSICAL ASSESSMENT     Vitals:    03/06/24 1657   BP: 107/75   Pulse: 104   Resp: 18   Temp: 98 °F (36.7 °C)   SpO2: 98%      Wt Readings from Last 3 Encounters:   03/06/24 66.9 kg (147 lb 6.4 oz)   02/26/24 61.2 kg (135 lb)   02/22/24 65.4 kg (144 lb 3.2 oz)     Ambulatory and capable of all selfcare but unable to carry out any work activities; up and about more than 50% of waking hours = 2  Voice is normal today.  Patient has had right recurrent laryngeal nerve problems in weak voice in the recent past.  Patient has some mild right shoulder pain.    We examined the relevant areas: yes  Findings are within the expected range for this stage of treatment: yes    ACTION ITEMS     Patient tolerating treatment well and as expected for this stage in their treatment and Continue  radiation therapy as planned    A replan was necessary due to the growth of the tumor between the planning CT and initial treatment yesterday.  Plan revision was made on cone beam CT and there has been even further regrowth over the last 24 hours.  This will be monitored closely.    Estimated Completion Date: 2/16 fractions to date - 3 more weeks      Vineet Fink MD  Radiation Oncology

## 2024-03-07 ENCOUNTER — HOSPITAL ENCOUNTER (OUTPATIENT)
Dept: RADIATION ONCOLOGY | Facility: HOSPITAL | Age: 59
Setting detail: RADIATION/ONCOLOGY SERIES
Discharge: HOME | End: 2024-03-07
Payer: COMMERCIAL

## 2024-03-07 ENCOUNTER — TELEPHONE (OUTPATIENT)
Dept: ONCOLOGY | Facility: CLINIC | Age: 59
End: 2024-03-07
Payer: COMMERCIAL

## 2024-03-07 LAB
RAD ONC ARIA COURSE ID: NORMAL
RAD ONC ARIA COURSE INTENT: NORMAL
RAD ONC ARIA COURSE LAST TREATMENT DATE: NORMAL
RAD ONC ARIA COURSE START DATE: NORMAL
RAD ONC ARIA COURSE TREATMENT ELAPSED DAYS: 2
RAD ONC ARIA FIRST TREATMENT DATE: NORMAL
RAD ONC ARIA PLAN FRACTIONS TREATED TO DATE: 2
RAD ONC ARIA PLAN ID: NORMAL
RAD ONC ARIA PLAN PRESCRIBED DOSE PER FRACTION: 2.5 GY
RAD ONC ARIA PLAN PRIMARY REFERENCE POINT: NORMAL
RAD ONC ARIA PLAN TOTAL FRACTIONS PRESCRIBED: 15
RAD ONC ARIA PLAN TOTAL PRESCRIBED DOSE: 3750 CGY
RAD ONC ARIA REFERENCE POINT DOSAGE GIVEN TO DATE: 7.5 GY
RAD ONC ARIA REFERENCE POINT ID: NORMAL
RAD ONC ARIA REFERENCE POINT SESSION DOSAGE GIVEN: 2.5 GY

## 2024-03-07 PROCEDURE — 77386: CPT | Performed by: RADIOLOGY

## 2024-03-07 PROCEDURE — 77014 CHG CT GUIDANCE RADIATION THERAPY FLDS PLACEMENT: CPT | Performed by: RADIOLOGY

## 2024-03-07 NOTE — TELEPHONE ENCOUNTER
LVM FOR PATIENT LETTING HIM KNOW THAT WE HAVE CANCELED HIS FOLLOW UP W/DR FONTAINE THAT WAS NEXT WEEK AND WILL GET HIM BACK IN AFTER HIS RADIATION IS FINISHED.

## 2024-03-08 ENCOUNTER — HOSPITAL ENCOUNTER (OUTPATIENT)
Dept: RADIATION ONCOLOGY | Facility: HOSPITAL | Age: 59
Discharge: HOME | End: 2024-03-08

## 2024-03-08 LAB
RAD ONC ARIA COURSE ID: NORMAL
RAD ONC ARIA COURSE INTENT: NORMAL
RAD ONC ARIA COURSE LAST TREATMENT DATE: NORMAL
RAD ONC ARIA COURSE START DATE: NORMAL
RAD ONC ARIA COURSE TREATMENT ELAPSED DAYS: 3
RAD ONC ARIA FIRST TREATMENT DATE: NORMAL
RAD ONC ARIA PLAN FRACTIONS TREATED TO DATE: 3
RAD ONC ARIA PLAN ID: NORMAL
RAD ONC ARIA PLAN PRESCRIBED DOSE PER FRACTION: 2.5 GY
RAD ONC ARIA PLAN PRIMARY REFERENCE POINT: NORMAL
RAD ONC ARIA PLAN TOTAL FRACTIONS PRESCRIBED: 15
RAD ONC ARIA PLAN TOTAL PRESCRIBED DOSE: 3750 CGY
RAD ONC ARIA REFERENCE POINT DOSAGE GIVEN TO DATE: 10 GY
RAD ONC ARIA REFERENCE POINT ID: NORMAL
RAD ONC ARIA REFERENCE POINT SESSION DOSAGE GIVEN: 2.5 GY

## 2024-03-08 PROCEDURE — 77014 CHG CT GUIDANCE RADIATION THERAPY FLDS PLACEMENT: CPT | Performed by: RADIOLOGY

## 2024-03-08 PROCEDURE — 77386: CPT | Performed by: RADIOLOGY

## 2024-03-11 ENCOUNTER — HOSPITAL ENCOUNTER (OUTPATIENT)
Dept: RADIATION ONCOLOGY | Facility: HOSPITAL | Age: 59
Discharge: HOME | End: 2024-03-11
Payer: COMMERCIAL

## 2024-03-11 LAB
RAD ONC ARIA COURSE ID: NORMAL
RAD ONC ARIA COURSE INTENT: NORMAL
RAD ONC ARIA COURSE LAST TREATMENT DATE: NORMAL
RAD ONC ARIA COURSE START DATE: NORMAL
RAD ONC ARIA COURSE TREATMENT ELAPSED DAYS: 6
RAD ONC ARIA FIRST TREATMENT DATE: NORMAL
RAD ONC ARIA PLAN FRACTIONS TREATED TO DATE: 4
RAD ONC ARIA PLAN ID: NORMAL
RAD ONC ARIA PLAN PRESCRIBED DOSE PER FRACTION: 2.5 GY
RAD ONC ARIA PLAN PRIMARY REFERENCE POINT: NORMAL
RAD ONC ARIA PLAN TOTAL FRACTIONS PRESCRIBED: 15
RAD ONC ARIA PLAN TOTAL PRESCRIBED DOSE: 3750 CGY
RAD ONC ARIA REFERENCE POINT DOSAGE GIVEN TO DATE: 12.5 GY
RAD ONC ARIA REFERENCE POINT ID: NORMAL
RAD ONC ARIA REFERENCE POINT SESSION DOSAGE GIVEN: 2.5 GY

## 2024-03-11 PROCEDURE — 77014 CHG CT GUIDANCE RADIATION THERAPY FLDS PLACEMENT: CPT | Performed by: RADIOLOGY

## 2024-03-11 PROCEDURE — 77386: CPT | Performed by: RADIOLOGY

## 2024-03-11 PROCEDURE — 77336 RADIATION PHYSICS CONSULT: CPT | Performed by: RADIOLOGY

## 2024-03-11 RX ORDER — HYDROCODONE BITARTRATE AND ACETAMINOPHEN 5; 325 MG/1; MG/1
1 TABLET ORAL EVERY 6 HOURS PRN
COMMUNITY
Start: 2024-02-28

## 2024-03-11 RX ORDER — MEMANTINE HYDROCHLORIDE 10 MG/1
10 TABLET ORAL 2 TIMES DAILY
COMMUNITY
Start: 2024-03-10

## 2024-03-12 ENCOUNTER — HOSPITAL ENCOUNTER (OUTPATIENT)
Dept: RADIATION ONCOLOGY | Facility: HOSPITAL | Age: 59
Discharge: HOME | End: 2024-03-12

## 2024-03-12 DIAGNOSIS — C34.90 SMALL CELL LUNG CANCER: Primary | ICD-10-CM

## 2024-03-12 DIAGNOSIS — R11.2 NAUSEA AND VOMITING, UNSPECIFIED VOMITING TYPE: Primary | ICD-10-CM

## 2024-03-12 DIAGNOSIS — C34.90 SMALL CELL LUNG CANCER: ICD-10-CM

## 2024-03-12 RX ORDER — OLANZAPINE 5 MG/1
5 TABLET ORAL NIGHTLY
Qty: 30 TABLET | Refills: 0 | Status: SHIPPED | OUTPATIENT
Start: 2024-03-12

## 2024-03-12 RX ORDER — OXYCODONE HYDROCHLORIDE 5 MG/1
5 TABLET ORAL EVERY 4 HOURS PRN
Qty: 90 TABLET | Refills: 0 | Status: SHIPPED | OUTPATIENT
Start: 2024-03-12

## 2024-03-13 ENCOUNTER — HOSPITAL ENCOUNTER (OUTPATIENT)
Dept: RADIATION ONCOLOGY | Facility: HOSPITAL | Age: 59
Discharge: HOME | End: 2024-03-13

## 2024-03-13 ENCOUNTER — RADIATION ONCOLOGY WEEKLY ASSESSMENT (OUTPATIENT)
Dept: RADIATION ONCOLOGY | Facility: HOSPITAL | Age: 59
End: 2024-03-13
Payer: COMMERCIAL

## 2024-03-13 VITALS
BODY MASS INDEX: 19.65 KG/M2 | DIASTOLIC BLOOD PRESSURE: 71 MMHG | SYSTOLIC BLOOD PRESSURE: 106 MMHG | HEART RATE: 94 BPM | HEIGHT: 71 IN | RESPIRATION RATE: 16 BRPM | OXYGEN SATURATION: 96 % | TEMPERATURE: 98.1 F | WEIGHT: 140.4 LBS

## 2024-03-13 DIAGNOSIS — C34.90 SMALL CELL LUNG CANCER: Primary | ICD-10-CM

## 2024-03-13 LAB
RAD ONC ARIA COURSE ID: NORMAL
RAD ONC ARIA COURSE INTENT: NORMAL
RAD ONC ARIA COURSE LAST TREATMENT DATE: NORMAL
RAD ONC ARIA COURSE START DATE: NORMAL
RAD ONC ARIA COURSE TREATMENT ELAPSED DAYS: 8
RAD ONC ARIA FIRST TREATMENT DATE: NORMAL
RAD ONC ARIA PLAN FRACTIONS TREATED TO DATE: 1
RAD ONC ARIA PLAN ID: NORMAL
RAD ONC ARIA PLAN PRESCRIBED DOSE PER FRACTION: 2.5 GY
RAD ONC ARIA PLAN PRIMARY REFERENCE POINT: NORMAL
RAD ONC ARIA PLAN TOTAL FRACTIONS PRESCRIBED: 11
RAD ONC ARIA PLAN TOTAL PRESCRIBED DOSE: 2750 CGY
RAD ONC ARIA REFERENCE POINT DOSAGE GIVEN TO DATE: 15 GY
RAD ONC ARIA REFERENCE POINT ID: NORMAL
RAD ONC ARIA REFERENCE POINT SESSION DOSAGE GIVEN: 2.5 GY

## 2024-03-13 PROCEDURE — 77338 DESIGN MLC DEVICE FOR IMRT: CPT | Performed by: RADIOLOGY

## 2024-03-13 PROCEDURE — 77301 RADIOTHERAPY DOSE PLAN IMRT: CPT | Performed by: RADIOLOGY

## 2024-03-13 PROCEDURE — 77300 RADIATION THERAPY DOSE PLAN: CPT | Performed by: RADIOLOGY

## 2024-03-13 PROCEDURE — 77386: CPT | Performed by: RADIOLOGY

## 2024-03-13 NOTE — PROGRESS NOTES
[facility] RADIATION ONCOLOGY  ON-TREATMENT VISIT NOTE    NAME: Romero Maciel  YOB: 1965  MRN #: [id number]  DATE OF SERVICE: 3/13/2024  PRESCRIBING PHYSICIAN:  No care team member to display     DIAGNOSIS:    No diagnosis found.   RADIATION THERAPY VISIT:  Continue radiation therapy, Dosimetry plan remains acceptable, Films reviewed and remains acceptable, Pain assessed, Pain management planned, Radiation dose schedule reviewed and remains acceptable, Radiation technique remains acceptable, and Symptoms within expected range    Radiation Treatments       Active   Plans   RtLung   Most recent treatment: Dose planned: 250 cGy (fraction 1 on 3/5/2024)   Total: Dose planned: 4,000 cGy (16 fractions)   Elapsed Days: 0      RtLun   Most recent treatment: Dose planned: 250 cGy (fraction 4 on 3/11/2024)   Total: Dose planned: 3,750 cGy (15 fractions)   Elapsed Days: 6      Reference Points   GARCIA WhBrain   Most recent treatment: Dose given: -- (on 2024)   Total: Dose given: 3,000 cGy   Elapsed Days: 13      RtLung   Most recent treatment: Dose given: 250 cGy (on 3/11/2024)   Total: Dose given: 1,250 cGy   Elapsed Days: 6                      PHYSICAL ASSESSMENT     Vitals:    24 1706   BP: 106/71   Pulse: 94   Resp: 16   Temp: 98.1 °F (36.7 °C)   SpO2: 96%      Wt Readings from Last 3 Encounters:   24 63.7 kg (140 lb 6.4 oz)   24 66.9 kg (147 lb 6.4 oz)   24 61.2 kg (135 lb)     Ambulatory and capable of all selfcare but unable to carry out any work activities; up and about more than 50% of waking hours = 2    We examined the relevant areas: yes  Findings are within the expected range for this stage of treatment: yes    ACTION ITEMS     Patient tolerating treatment well and as expected for this stage in their treatment and Continue radiation therapy as planned    Mr. Maciel is stable at this time.  He has had no ill effects from cranial irradiation.  He states he  has no chest pain and no distinct shortness of breath.  He did have what appeared to be collapse of the right middle lobe and this has yet to open up.  We have made changes accordingly with his treatment volumes and perform recalculations as well.  We will continue treatment as planned.    Estimated Completion Date: 2 1/2 weeks      iVneet Fink MD  Radiation Oncology  [facility]

## 2024-03-14 ENCOUNTER — HOSPITAL ENCOUNTER (OUTPATIENT)
Dept: RADIATION ONCOLOGY | Facility: HOSPITAL | Age: 59
Discharge: HOME | End: 2024-03-14

## 2024-03-14 LAB
RAD ONC ARIA COURSE ID: NORMAL
RAD ONC ARIA COURSE INTENT: NORMAL
RAD ONC ARIA COURSE LAST TREATMENT DATE: NORMAL
RAD ONC ARIA COURSE START DATE: NORMAL
RAD ONC ARIA COURSE TREATMENT ELAPSED DAYS: 9
RAD ONC ARIA FIRST TREATMENT DATE: NORMAL
RAD ONC ARIA PLAN FRACTIONS TREATED TO DATE: 2
RAD ONC ARIA PLAN ID: NORMAL
RAD ONC ARIA PLAN PRESCRIBED DOSE PER FRACTION: 2.5 GY
RAD ONC ARIA PLAN PRIMARY REFERENCE POINT: NORMAL
RAD ONC ARIA PLAN TOTAL FRACTIONS PRESCRIBED: 11
RAD ONC ARIA PLAN TOTAL PRESCRIBED DOSE: 2750 CGY
RAD ONC ARIA REFERENCE POINT DOSAGE GIVEN TO DATE: 17.5 GY
RAD ONC ARIA REFERENCE POINT ID: NORMAL
RAD ONC ARIA REFERENCE POINT SESSION DOSAGE GIVEN: 2.5 GY

## 2024-03-14 PROCEDURE — 77336 RADIATION PHYSICS CONSULT: CPT | Performed by: RADIOLOGY

## 2024-03-14 PROCEDURE — 77386: CPT | Performed by: RADIOLOGY

## 2024-03-14 PROCEDURE — 77014 CHG CT GUIDANCE RADIATION THERAPY FLDS PLACEMENT: CPT | Performed by: RADIOLOGY

## 2024-03-15 ENCOUNTER — HOSPITAL ENCOUNTER (OUTPATIENT)
Dept: RADIATION ONCOLOGY | Facility: HOSPITAL | Age: 59
Discharge: HOME | End: 2024-03-15

## 2024-03-15 LAB
RAD ONC ARIA COURSE ID: NORMAL
RAD ONC ARIA COURSE INTENT: NORMAL
RAD ONC ARIA COURSE LAST TREATMENT DATE: NORMAL
RAD ONC ARIA COURSE START DATE: NORMAL
RAD ONC ARIA COURSE TREATMENT ELAPSED DAYS: 10
RAD ONC ARIA FIRST TREATMENT DATE: NORMAL
RAD ONC ARIA PLAN FRACTIONS TREATED TO DATE: 3
RAD ONC ARIA PLAN ID: NORMAL
RAD ONC ARIA PLAN PRESCRIBED DOSE PER FRACTION: 2.5 GY
RAD ONC ARIA PLAN PRIMARY REFERENCE POINT: NORMAL
RAD ONC ARIA PLAN TOTAL FRACTIONS PRESCRIBED: 11
RAD ONC ARIA PLAN TOTAL PRESCRIBED DOSE: 2750 CGY
RAD ONC ARIA REFERENCE POINT DOSAGE GIVEN TO DATE: 20 GY
RAD ONC ARIA REFERENCE POINT ID: NORMAL
RAD ONC ARIA REFERENCE POINT SESSION DOSAGE GIVEN: 2.5 GY

## 2024-03-15 PROCEDURE — 77014 CHG CT GUIDANCE RADIATION THERAPY FLDS PLACEMENT: CPT | Performed by: RADIOLOGY

## 2024-03-15 PROCEDURE — 77386: CPT | Performed by: RADIOLOGY

## 2024-03-18 ENCOUNTER — HOSPITAL ENCOUNTER (OUTPATIENT)
Dept: RADIATION ONCOLOGY | Facility: HOSPITAL | Age: 59
Discharge: HOME | End: 2024-03-18
Payer: COMMERCIAL

## 2024-03-18 LAB
RAD ONC ARIA COURSE ID: NORMAL
RAD ONC ARIA COURSE INTENT: NORMAL
RAD ONC ARIA COURSE LAST TREATMENT DATE: NORMAL
RAD ONC ARIA COURSE START DATE: NORMAL
RAD ONC ARIA COURSE TREATMENT ELAPSED DAYS: 13
RAD ONC ARIA FIRST TREATMENT DATE: NORMAL
RAD ONC ARIA PLAN FRACTIONS TREATED TO DATE: 4
RAD ONC ARIA PLAN ID: NORMAL
RAD ONC ARIA PLAN PRESCRIBED DOSE PER FRACTION: 2.5 GY
RAD ONC ARIA PLAN PRIMARY REFERENCE POINT: NORMAL
RAD ONC ARIA PLAN TOTAL FRACTIONS PRESCRIBED: 11
RAD ONC ARIA PLAN TOTAL PRESCRIBED DOSE: 2750 CGY
RAD ONC ARIA REFERENCE POINT DOSAGE GIVEN TO DATE: 22.5 GY
RAD ONC ARIA REFERENCE POINT ID: NORMAL
RAD ONC ARIA REFERENCE POINT SESSION DOSAGE GIVEN: 2.5 GY

## 2024-03-18 PROCEDURE — 77386: CPT | Performed by: RADIOLOGY

## 2024-03-18 PROCEDURE — 77014 CHG CT GUIDANCE RADIATION THERAPY FLDS PLACEMENT: CPT | Performed by: RADIOLOGY

## 2024-03-19 ENCOUNTER — HOSPITAL ENCOUNTER (OUTPATIENT)
Dept: RADIATION ONCOLOGY | Facility: HOSPITAL | Age: 59
Discharge: HOME | End: 2024-03-19

## 2024-03-19 LAB
RAD ONC ARIA COURSE ID: NORMAL
RAD ONC ARIA COURSE INTENT: NORMAL
RAD ONC ARIA COURSE LAST TREATMENT DATE: NORMAL
RAD ONC ARIA COURSE START DATE: NORMAL
RAD ONC ARIA COURSE TREATMENT ELAPSED DAYS: 14
RAD ONC ARIA FIRST TREATMENT DATE: NORMAL
RAD ONC ARIA PLAN FRACTIONS TREATED TO DATE: 5
RAD ONC ARIA PLAN ID: NORMAL
RAD ONC ARIA PLAN PRESCRIBED DOSE PER FRACTION: 2.5 GY
RAD ONC ARIA PLAN PRIMARY REFERENCE POINT: NORMAL
RAD ONC ARIA PLAN TOTAL FRACTIONS PRESCRIBED: 11
RAD ONC ARIA PLAN TOTAL PRESCRIBED DOSE: 2750 CGY
RAD ONC ARIA REFERENCE POINT DOSAGE GIVEN TO DATE: 25 GY
RAD ONC ARIA REFERENCE POINT ID: NORMAL
RAD ONC ARIA REFERENCE POINT SESSION DOSAGE GIVEN: 2.5 GY

## 2024-03-19 PROCEDURE — 77014 CHG CT GUIDANCE RADIATION THERAPY FLDS PLACEMENT: CPT | Performed by: RADIOLOGY

## 2024-03-19 PROCEDURE — 77386: CPT | Performed by: RADIOLOGY

## 2024-03-20 ENCOUNTER — RADIATION ONCOLOGY WEEKLY ASSESSMENT (OUTPATIENT)
Dept: RADIATION ONCOLOGY | Facility: HOSPITAL | Age: 59
End: 2024-03-20
Payer: COMMERCIAL

## 2024-03-20 ENCOUNTER — HOSPITAL ENCOUNTER (OUTPATIENT)
Dept: RADIATION ONCOLOGY | Facility: HOSPITAL | Age: 59
Discharge: HOME | End: 2024-03-20

## 2024-03-20 VITALS
OXYGEN SATURATION: 96 % | DIASTOLIC BLOOD PRESSURE: 64 MMHG | SYSTOLIC BLOOD PRESSURE: 94 MMHG | HEIGHT: 71 IN | BODY MASS INDEX: 20.44 KG/M2 | WEIGHT: 146 LBS | RESPIRATION RATE: 17 BRPM | HEART RATE: 92 BPM | TEMPERATURE: 98.2 F

## 2024-03-20 DIAGNOSIS — C34.90 SMALL CELL LUNG CANCER: Primary | ICD-10-CM

## 2024-03-20 DIAGNOSIS — R91.8 LUNG MASS: ICD-10-CM

## 2024-03-20 LAB
RAD ONC ARIA COURSE ID: NORMAL
RAD ONC ARIA COURSE INTENT: NORMAL
RAD ONC ARIA COURSE LAST TREATMENT DATE: NORMAL
RAD ONC ARIA COURSE START DATE: NORMAL
RAD ONC ARIA COURSE TREATMENT ELAPSED DAYS: 15
RAD ONC ARIA FIRST TREATMENT DATE: NORMAL
RAD ONC ARIA PLAN FRACTIONS TREATED TO DATE: 6
RAD ONC ARIA PLAN ID: NORMAL
RAD ONC ARIA PLAN PRESCRIBED DOSE PER FRACTION: 2.5 GY
RAD ONC ARIA PLAN PRIMARY REFERENCE POINT: NORMAL
RAD ONC ARIA PLAN TOTAL FRACTIONS PRESCRIBED: 11
RAD ONC ARIA PLAN TOTAL PRESCRIBED DOSE: 2750 CGY
RAD ONC ARIA REFERENCE POINT DOSAGE GIVEN TO DATE: 27.5 GY
RAD ONC ARIA REFERENCE POINT ID: NORMAL
RAD ONC ARIA REFERENCE POINT SESSION DOSAGE GIVEN: 2.5 GY

## 2024-03-20 PROCEDURE — 77386: CPT | Performed by: RADIOLOGY

## 2024-03-20 PROCEDURE — 77014 CHG CT GUIDANCE RADIATION THERAPY FLDS PLACEMENT: CPT | Performed by: RADIOLOGY

## 2024-03-20 NOTE — PROGRESS NOTES
[facility] RADIATION ONCOLOGY  ON-TREATMENT VISIT NOTE    NAME: Romero Maciel  YOB: 1965  MRN #: [id number]  DATE OF SERVICE: 3/20/2024  PRESCRIBING PHYSICIAN: Vineet Fink MD    DIAGNOSIS:      ICD-10-CM ICD-9-CM   1. Small cell lung cancer  C34.90 162.9   2. Lung mass  R91.8 786.6      RADIATION THERAPY VISIT:  Continue radiation therapy, Dosimetry plan remains acceptable, Films reviewed and remains acceptable, Pain assessed, Pain management planned, Radiation dose schedule reviewed and remains acceptable, Radiation technique remains acceptable, and Symptoms within expected range    Radiation Treatments       Active   Plans   RtLung   Most recent treatment: Dose planned: 250 cGy (fraction 1 on 3/5/2024)   Total: Dose planned: 4,000 cGy (16 fractions)   Elapsed Days: 0      RtLun   Most recent treatment: Dose planned: 250 cGy (fraction 4 on 3/11/2024)   Total: Dose planned: 3,750 cGy (15 fractions)   Elapsed Days: 6      RtLun   Most recent treatment: Dose planned: 250 cGy (fraction 6 on 3/20/2024)   Total: Dose planned: 2,750 cGy (11 fractions)   Elapsed Days: 15      Reference Points   GARCIA WhBrain   Most recent treatment: Dose given: -- (on 2024)   Total: Dose given: 3,000 cGy   Elapsed Days: 13      RtLung   Most recent treatment: Dose given: 250 cGy (on 3/20/2024)   Total: Dose given: 2,750 cGy   Elapsed Days: 15                    [] Concurrent Chemo   Regimen:       PHYSICAL ASSESSMENT     Lungs are auscultated and found to be mostly clear with occasional rhonchi in the bases.  The neck is without adenopathy.  Skin is clear.  Extremities without edema.    Vitals:    24 1547   BP: 94/64   Pulse: 92   Resp: 17   Temp: 98.2 °F (36.8 °C)   SpO2: 96%      Wt Readings from Last 3 Encounters:   24 66.2 kg (146 lb)   24 63.7 kg (140 lb 6.4 oz)   24 66.9 kg (147 lb 6.4 oz)     Ambulatory and capable of all selfcare but unable to carry out any work  activities; up and about more than 50% of waking hours = 2    We examined the relevant areas: yes  Findings are within the expected range for this stage of treatment: yes    ACTION ITEMS     Patient tolerating treatment well and as expected for this stage in their treatment and Continue radiation therapy as planned. I will make adjustments based on CBCT and do field reduction based on recent response.    Estimated Completion Date: 1 week      Vineet Fink MD  Radiation Oncology  [facility]

## 2024-03-21 ENCOUNTER — HOSPITAL ENCOUNTER (OUTPATIENT)
Dept: RADIATION ONCOLOGY | Facility: HOSPITAL | Age: 59
Discharge: HOME | End: 2024-03-21

## 2024-03-21 LAB
RAD ONC ARIA COURSE ID: NORMAL
RAD ONC ARIA COURSE INTENT: NORMAL
RAD ONC ARIA COURSE LAST TREATMENT DATE: NORMAL
RAD ONC ARIA COURSE START DATE: NORMAL
RAD ONC ARIA COURSE TREATMENT ELAPSED DAYS: 16
RAD ONC ARIA FIRST TREATMENT DATE: NORMAL
RAD ONC ARIA PLAN FRACTIONS TREATED TO DATE: 7
RAD ONC ARIA PLAN ID: NORMAL
RAD ONC ARIA PLAN PRESCRIBED DOSE PER FRACTION: 2.5 GY
RAD ONC ARIA PLAN PRIMARY REFERENCE POINT: NORMAL
RAD ONC ARIA PLAN TOTAL FRACTIONS PRESCRIBED: 11
RAD ONC ARIA PLAN TOTAL PRESCRIBED DOSE: 2750 CGY
RAD ONC ARIA REFERENCE POINT DOSAGE GIVEN TO DATE: 30 GY
RAD ONC ARIA REFERENCE POINT ID: NORMAL
RAD ONC ARIA REFERENCE POINT SESSION DOSAGE GIVEN: 2.5 GY

## 2024-03-21 PROCEDURE — 77014 CHG CT GUIDANCE RADIATION THERAPY FLDS PLACEMENT: CPT | Performed by: RADIOLOGY

## 2024-03-21 PROCEDURE — 77336 RADIATION PHYSICS CONSULT: CPT | Performed by: RADIOLOGY

## 2024-03-21 PROCEDURE — 77386: CPT | Performed by: RADIOLOGY

## 2024-03-22 ENCOUNTER — HOSPITAL ENCOUNTER (OUTPATIENT)
Dept: RADIATION ONCOLOGY | Facility: HOSPITAL | Age: 59
Discharge: HOME | End: 2024-03-22

## 2024-03-22 LAB
RAD ONC ARIA COURSE ID: NORMAL
RAD ONC ARIA COURSE INTENT: NORMAL
RAD ONC ARIA COURSE LAST TREATMENT DATE: NORMAL
RAD ONC ARIA COURSE START DATE: NORMAL
RAD ONC ARIA COURSE TREATMENT ELAPSED DAYS: 17
RAD ONC ARIA FIRST TREATMENT DATE: NORMAL
RAD ONC ARIA PLAN FRACTIONS TREATED TO DATE: 8
RAD ONC ARIA PLAN ID: NORMAL
RAD ONC ARIA PLAN PRESCRIBED DOSE PER FRACTION: 2.5 GY
RAD ONC ARIA PLAN PRIMARY REFERENCE POINT: NORMAL
RAD ONC ARIA PLAN TOTAL FRACTIONS PRESCRIBED: 11
RAD ONC ARIA PLAN TOTAL PRESCRIBED DOSE: 2750 CGY
RAD ONC ARIA REFERENCE POINT DOSAGE GIVEN TO DATE: 32.5 GY
RAD ONC ARIA REFERENCE POINT ID: NORMAL
RAD ONC ARIA REFERENCE POINT SESSION DOSAGE GIVEN: 2.5 GY

## 2024-03-22 PROCEDURE — 77014 CHG CT GUIDANCE RADIATION THERAPY FLDS PLACEMENT: CPT | Performed by: RADIOLOGY

## 2024-03-22 PROCEDURE — 77386: CPT | Performed by: RADIOLOGY

## 2024-03-25 ENCOUNTER — HOSPITAL ENCOUNTER (OUTPATIENT)
Dept: RADIATION ONCOLOGY | Facility: HOSPITAL | Age: 59
Discharge: HOME | End: 2024-03-25
Payer: COMMERCIAL

## 2024-03-25 LAB
RAD ONC ARIA COURSE ID: NORMAL
RAD ONC ARIA COURSE INTENT: NORMAL
RAD ONC ARIA COURSE LAST TREATMENT DATE: NORMAL
RAD ONC ARIA COURSE START DATE: NORMAL
RAD ONC ARIA COURSE TREATMENT ELAPSED DAYS: 20
RAD ONC ARIA FIRST TREATMENT DATE: NORMAL
RAD ONC ARIA PLAN FRACTIONS TREATED TO DATE: 9
RAD ONC ARIA PLAN ID: NORMAL
RAD ONC ARIA PLAN PRESCRIBED DOSE PER FRACTION: 2.5 GY
RAD ONC ARIA PLAN PRIMARY REFERENCE POINT: NORMAL
RAD ONC ARIA PLAN TOTAL FRACTIONS PRESCRIBED: 11
RAD ONC ARIA PLAN TOTAL PRESCRIBED DOSE: 2750 CGY
RAD ONC ARIA REFERENCE POINT DOSAGE GIVEN TO DATE: 35 GY
RAD ONC ARIA REFERENCE POINT ID: NORMAL
RAD ONC ARIA REFERENCE POINT SESSION DOSAGE GIVEN: 2.5 GY

## 2024-03-25 PROCEDURE — 77386: CPT | Performed by: RADIOLOGY

## 2024-03-25 PROCEDURE — 77014 CHG CT GUIDANCE RADIATION THERAPY FLDS PLACEMENT: CPT | Performed by: RADIOLOGY

## 2024-03-26 ENCOUNTER — HOSPITAL ENCOUNTER (OUTPATIENT)
Dept: RADIATION ONCOLOGY | Facility: HOSPITAL | Age: 59
Discharge: HOME | End: 2024-03-26

## 2024-03-26 LAB
RAD ONC ARIA COURSE ID: NORMAL
RAD ONC ARIA COURSE INTENT: NORMAL
RAD ONC ARIA COURSE LAST TREATMENT DATE: NORMAL
RAD ONC ARIA COURSE START DATE: NORMAL
RAD ONC ARIA COURSE TREATMENT ELAPSED DAYS: 21
RAD ONC ARIA FIRST TREATMENT DATE: NORMAL
RAD ONC ARIA PLAN FRACTIONS TREATED TO DATE: 10
RAD ONC ARIA PLAN ID: NORMAL
RAD ONC ARIA PLAN PRESCRIBED DOSE PER FRACTION: 2.5 GY
RAD ONC ARIA PLAN PRIMARY REFERENCE POINT: NORMAL
RAD ONC ARIA PLAN TOTAL FRACTIONS PRESCRIBED: 11
RAD ONC ARIA PLAN TOTAL PRESCRIBED DOSE: 2750 CGY
RAD ONC ARIA REFERENCE POINT DOSAGE GIVEN TO DATE: 37.5 GY
RAD ONC ARIA REFERENCE POINT ID: NORMAL
RAD ONC ARIA REFERENCE POINT SESSION DOSAGE GIVEN: 2.5 GY

## 2024-03-26 PROCEDURE — 77386: CPT | Performed by: RADIOLOGY

## 2024-03-26 PROCEDURE — 77014 CHG CT GUIDANCE RADIATION THERAPY FLDS PLACEMENT: CPT | Performed by: RADIOLOGY

## 2024-03-27 ENCOUNTER — HOSPITAL ENCOUNTER (OUTPATIENT)
Dept: RADIATION ONCOLOGY | Facility: HOSPITAL | Age: 59
Discharge: HOME | End: 2024-03-27

## 2024-03-27 ENCOUNTER — RADIATION ONCOLOGY WEEKLY ASSESSMENT (OUTPATIENT)
Dept: RADIATION ONCOLOGY | Facility: HOSPITAL | Age: 59
End: 2024-03-27
Payer: COMMERCIAL

## 2024-03-27 VITALS
SYSTOLIC BLOOD PRESSURE: 104 MMHG | WEIGHT: 144.8 LBS | OXYGEN SATURATION: 99 % | BODY MASS INDEX: 20.27 KG/M2 | HEART RATE: 90 BPM | TEMPERATURE: 97.8 F | HEIGHT: 71 IN | RESPIRATION RATE: 18 BRPM | DIASTOLIC BLOOD PRESSURE: 70 MMHG

## 2024-03-27 DIAGNOSIS — C34.90 SMALL CELL LUNG CANCER: Primary | ICD-10-CM

## 2024-03-27 DIAGNOSIS — C79.31 METASTASIS TO BRAIN: ICD-10-CM

## 2024-03-27 LAB
RAD ONC ARIA COURSE ID: NORMAL
RAD ONC ARIA COURSE INTENT: NORMAL
RAD ONC ARIA COURSE LAST TREATMENT DATE: NORMAL
RAD ONC ARIA COURSE START DATE: NORMAL
RAD ONC ARIA COURSE TREATMENT ELAPSED DAYS: 22
RAD ONC ARIA FIRST TREATMENT DATE: NORMAL
RAD ONC ARIA PLAN FRACTIONS TREATED TO DATE: 11
RAD ONC ARIA PLAN ID: NORMAL
RAD ONC ARIA PLAN PRESCRIBED DOSE PER FRACTION: 2.5 GY
RAD ONC ARIA PLAN PRIMARY REFERENCE POINT: NORMAL
RAD ONC ARIA PLAN TOTAL FRACTIONS PRESCRIBED: 11
RAD ONC ARIA PLAN TOTAL PRESCRIBED DOSE: 2750 CGY
RAD ONC ARIA REFERENCE POINT DOSAGE GIVEN TO DATE: 40 GY
RAD ONC ARIA REFERENCE POINT ID: NORMAL
RAD ONC ARIA REFERENCE POINT SESSION DOSAGE GIVEN: 2.5 GY

## 2024-03-27 PROCEDURE — 77386: CPT | Performed by: RADIOLOGY

## 2024-03-27 NOTE — PROGRESS NOTES
RADIATION THERAPY COMPLETION and DISCHARGE     Romero Maciel completed radiation therapy on 03/27/2024 for Small cell lung cancer [C34.90]. The summary of his treatment is as follows:    TREATMENT SITE:   Rt Lung START DATE:   03/05/2024   TOTAL DOSE (cGy):   4000 END DATE:   03/27/2024    DOSE/FRACTION:   250 ELAPSED DAYS:   20   TOTAL FACTIONS:   16 PHYSICIAN:    Dr. Vineet Fink     1-month follow-up appointment after completion of radiation therapy scheduled on April 30, 2024 at 3:00 pm.    The following instructions were provided to the patient and/or family in their printed after visit summary (AVS) as well as discussed in-person by the radiation oncology nurse or medical assistant. The patient and/or family had the opportunity to ask questions and verbalized their questions were adequately answered. Encouraged patient to contact our department with any questions or concerns.  _______________________________________________________________________    RADIATION THERAPY DISCHARGE INSTRUCTIONS  Chest    CONGRATULATIONS! You completed 16 radiation treatments for treatment of your right lung mass. These discharge instructions are important for you to follow until your one-month follow up appointment with Dr. Goodrich. Please make sure to review these instructions and call the Radiation Oncology Department if you have any questions or concerns with symptoms you may experience. Thank you for trusting us with your cancer treatment!    DIET  Eat a regular, well balanced diet that is high in protein such as meat, eggs, cheese, and nut butters  Drink 48 to 64 ounces of fluid daily  Use nutritional supplements if you are not able to eat full meals  Monitor your weight and report continued weight loss to your doctor    MEDICATIONS  Use Tylenol as needed to decrease discomfort and irritation to treatment area  Take pain medications only as prescribed  Take a laxative or stool softener as needed to prevent constipation due to  pain medications  Use Laura's Magic Mouthwash or other oral pain relief medication before meals and before taking medications as needed to ease the discomfort of swallowing (if applicable)    SKIN CARE  Wash treated skin gently with your hands using a mild, non-drying soap such as Dove® or Aveeno® until skin returns to normal  Pat skin dry - do not rub  Keep treated skin moist with frequent applications of Aquaphor® or unscented lotion (such as Eucerin)®  Always protect your treated skin when outdoors by wearing protective clothing and applying sunscreen SPF 15 or higher at least 30 minutes before going outdoors and reapply frequently  Resume use of deodorant to the armpit of the affected arm when skin reactions improve    ACTIVITY  Fatigue is a normal side effect of radiation therapy and should improve over time  Alternate rest and activity  Exercise such as walking may help to improve your fatigue    FOLLOW-UP  Continue follow-up appointments with all other doctors as necessary  Call your radiation oncology doctor if you are concerned with any side effects you are experiencing, such as increased shortness of breath, fevers or chills, night sweats, increased coughing or new cough, blood in your sputum, or difficulty swallowing: [phone]    _______________________________________________________________________    Completed by: Caprice Perez MA, Radiation Oncology Medical Assistant on 03/27/24 at 15:24 EDT

## 2024-03-27 NOTE — PROGRESS NOTES
[facility] RADIATION ONCOLOGY  ON-TREATMENT VISIT NOTE    NAME: Romero Maciel  YOB: 1965  MRN #: [id number]  DATE OF SERVICE: 3/27/2024  PRESCRIBING PHYSICIAN: Vineet Fink MD       DIAGNOSIS:      ICD-10-CM ICD-9-CM   1. Small cell lung cancer  C34.90 162.9   2. Metastasis to brain  C79.31 198.3      RADIATION THERAPY VISIT:  Continue radiation therapy, Dosimetry plan remains acceptable, Films reviewed and remains acceptable, Pain assessed, Pain management planned, Radiation dose schedule reviewed and remains acceptable, Radiation technique remains acceptable, and Symptoms within expected range    Radiation Treatments       Active   Plans   RtLung   Most recent treatment: Dose planned: 250 cGy (fraction 1 on 3/5/2024)   Total: Dose planned: 4,000 cGy (16 fractions)   Elapsed Days: 0      RtLun   Most recent treatment: Dose planned: 250 cGy (fraction 4 on 3/11/2024)   Total: Dose planned: 3,750 cGy (15 fractions)   Elapsed Days: 6      Reference Points   RtLung   Most recent treatment: Dose given: 250 cGy (on 3/27/2024)   Total: Dose given: 4,000 cGy   Elapsed Days: 22                    [] Concurrent Chemo   Regimen:       PHYSICAL ASSESSMENT     Mr. Maciel is stable at this point.  He actually looks better today than at any point during his treatment course.  Today was his last treatment to his chest after having previously completed radiotherapy to the brain    Physical examination demonstrates no palpable adenopathy in the head neck field.  The lungs are auscultated and actually found to be clear in all quadrants with no rales or rhonchi.  Patient appears to be moving air in all lung fields.  Cardiovascular exam demonstrates regular rate and rhythm without gallop or murmur.  Abdomen is soft.  Extremities without edema.    Vitals:    24 1546   BP: 104/70   Pulse: 90   Resp: 18   Temp: 97.8 °F (36.6 °C)   SpO2: 99%      Wt Readings from Last 3 Encounters:   24 65.7 kg  (144 lb 12.8 oz)   03/20/24 66.2 kg (146 lb)   03/13/24 63.7 kg (140 lb 6.4 oz)     Ambulatory and capable of all selfcare but unable to carry out any work activities; up and about more than 50% of waking hours = 2    We examined the relevant areas: yes  Findings are within the expected range for this stage of treatment: yes    ACTION ITEMS     Patient tolerating treatment well and as expected for this stage in their treatment and patient will be seen in 1 month for further follow-up evaluation.    Estimated Completion Date: Today      Vineet Fink MD  Radiation Oncology  [facility]

## 2024-03-27 NOTE — PATIENT INSTRUCTIONS
RADIATION THERAPY DISCHARGE INSTRUCTIONS  Chest    CONGRATULATIONS! You completed 16 radiation treatments for treatment of your right lung mass. These discharge instructions are important for you to follow until your one-month follow up appointment with Dr. Goodrich. Please make sure to review these instructions and call the Radiation Oncology Department if you have any questions or concerns with symptoms you may experience. Thank you for trusting us with your cancer treatment!    DIET  Eat a regular, well balanced diet that is high in protein such as meat, eggs, cheese, and nut butters  Drink 48 to 64 ounces of fluid daily  Use nutritional supplements if you are not able to eat full meals  Monitor your weight and report continued weight loss to your doctor    MEDICATIONS  Use Tylenol as needed to decrease discomfort and irritation to treatment area  Take pain medications only as prescribed  Take a laxative or stool softener as needed to prevent constipation due to pain medications  Use Laura's Magic Mouthwash or other oral pain relief medication before meals and before taking medications as needed to ease the discomfort of swallowing (if applicable)    SKIN CARE  Wash treated skin gently with your hands using a mild, non-drying soap such as Dove® or Aveeno® until skin returns to normal  Pat skin dry - do not rub  Keep treated skin moist with frequent applications of Aquaphor® or unscented lotion (such as Eucerin)®  Always protect your treated skin when outdoors by wearing protective clothing and applying sunscreen SPF 15 or higher at least 30 minutes before going outdoors and reapply frequently  Resume use of deodorant to the armpit of the affected arm when skin reactions improve    ACTIVITY  Fatigue is a normal side effect of radiation therapy and should improve over time  Alternate rest and activity  Exercise such as walking may help to improve your fatigue    FOLLOW-UP  Continue follow-up appointments with all other  doctors as necessary  Call your radiation oncology doctor if you are concerned with any side effects you are experiencing, such as increased shortness of breath, fevers or chills, night sweats, increased coughing or new cough, blood in your sputum, or difficulty swallowing: [phone]    _______________________________________________________________________    Completed by: Caprice Perez MA on 3/27/2024 at 15:23 EDT

## 2024-03-27 NOTE — PROGRESS NOTES
Radiation Treatment Summary Note      Patient Name: Romero Maciel  : 1965    Attending Provider: Vineet Fink MD      Diagnosis:     ICD-10-CM ICD-9-CM   1. Small cell lung cancer  C34.90 162.9   2. Metastasis to brain  C79.31 198.3       Radiation Start Date: 3/5/2024    Radiation Completion Date: 3/27/2024      Prescription:     Sequential    Site: Right lower lobe  Laterality: Right  Total Dose: 1250 cGy  Dose per Fraction: 250 cGy  Total Fractions: 5  Daily or BID: Daily  Modality: Photon  Technique: IMRT/VMAT/Rapid Arc  Bolus: No       THEN      2.   Site: Right lung boost  Laterality: Right  Total Dose: 2750 cGy  Dose per Fraction: 250 cGy  Total Fractions: 11  Daily or BID: Daily  Modality: Photon  Technique: IMRT/VMAT/Rapid Arc  Bolus: No    Final Delivered Dose Deviated From Initially Prescribed Dose: No    Concurrent Chemotherapy: No    Patient Tolerated Treatment Without Unexpected Side Effects/Complications: Yes, overall, Mr. Ayala did quite well.  He had problems initially with pleural effusion and increasing atelectasis of the right lung.  Fortunately, this improved fairly dramatically towards the end of treatment and there was obvious reinflation of the right lung field.  Clinically, Mr. Maciel is doing well.  He denies pain and states that he is not overly short of breath at this time.  Patient will continue to follow with Dr. Rubio and consider additional systemic therapy at this time.  He will be seen in our department in 4 weeks for further follow-up evaluation.    ECOG: Ambulatory and capable of all selfcare but unable to carry out any work activities; up and about more than 50% of waking hours = 2    Pain Management Plan: None Indicated/PRN OTC    Follow-Up Plan: 4-6 weeks    Imaging Ordered for Follow-Up: None/NA        Vineet Fink MD

## 2024-03-28 LAB
RAD ONC ARIA COURSE END DATE: NORMAL
RAD ONC ARIA COURSE ID: NORMAL
RAD ONC ARIA COURSE INTENT: NORMAL
RAD ONC ARIA COURSE LAST TREATMENT DATE: NORMAL
RAD ONC ARIA COURSE START DATE: NORMAL
RAD ONC ARIA COURSE TREATMENT ELAPSED DAYS: 22
RAD ONC ARIA FIRST TREATMENT DATE: NORMAL
RAD ONC ARIA PLAN FRACTIONS TREATED TO DATE: 1
RAD ONC ARIA PLAN FRACTIONS TREATED TO DATE: 11
RAD ONC ARIA PLAN FRACTIONS TREATED TO DATE: 4
RAD ONC ARIA PLAN ID: NORMAL
RAD ONC ARIA PLAN NAME: NORMAL
RAD ONC ARIA PLAN PRESCRIBED DOSE PER FRACTION: 2.5 GY
RAD ONC ARIA PLAN PRIMARY REFERENCE POINT: NORMAL
RAD ONC ARIA PLAN TOTAL FRACTIONS PRESCRIBED: 1
RAD ONC ARIA PLAN TOTAL FRACTIONS PRESCRIBED: 11
RAD ONC ARIA PLAN TOTAL FRACTIONS PRESCRIBED: 15
RAD ONC ARIA PLAN TOTAL PRESCRIBED DOSE: 2750 CGY
RAD ONC ARIA PLAN TOTAL PRESCRIBED DOSE: 3750 CGY
RAD ONC ARIA PLAN TOTAL PRESCRIBED DOSE: 4000 CGY
RAD ONC ARIA REFERENCE POINT DOSAGE GIVEN TO DATE: 40 GY
RAD ONC ARIA REFERENCE POINT ID: NORMAL

## 2024-04-02 ENCOUNTER — PATIENT MESSAGE (OUTPATIENT)
Dept: ONCOLOGY | Facility: CLINIC | Age: 59
End: 2024-04-02
Payer: COMMERCIAL

## 2024-04-08 DIAGNOSIS — R11.2 NAUSEA AND VOMITING, UNSPECIFIED VOMITING TYPE: ICD-10-CM

## 2024-04-08 DIAGNOSIS — C34.90 SMALL CELL LUNG CANCER: ICD-10-CM

## 2024-04-08 RX ORDER — OLANZAPINE 5 MG/1
5 TABLET ORAL NIGHTLY
Qty: 30 TABLET | Refills: 0 | Status: SHIPPED | OUTPATIENT
Start: 2024-04-08

## 2024-04-10 ENCOUNTER — TELEPHONE (OUTPATIENT)
Dept: ONCOLOGY | Facility: CLINIC | Age: 59
End: 2024-04-10
Payer: COMMERCIAL

## 2024-04-14 ENCOUNTER — PATIENT MESSAGE (OUTPATIENT)
Dept: ONCOLOGY | Facility: CLINIC | Age: 59
End: 2024-04-14
Payer: COMMERCIAL

## 2024-04-15 ENCOUNTER — DOCUMENTATION (OUTPATIENT)
Dept: ONCOLOGY | Facility: CLINIC | Age: 59
End: 2024-04-15
Payer: COMMERCIAL

## 2024-04-15 ENCOUNTER — LAB (OUTPATIENT)
Dept: LAB | Facility: HOSPITAL | Age: 59
End: 2024-04-15
Payer: COMMERCIAL

## 2024-04-15 ENCOUNTER — HOSPITAL ENCOUNTER (OUTPATIENT)
Dept: PET IMAGING | Facility: HOSPITAL | Age: 59
Discharge: HOME | End: 2024-04-15
Admitting: NURSE PRACTITIONER
Payer: COMMERCIAL

## 2024-04-15 DIAGNOSIS — C34.90 SMALL CELL LUNG CANCER: ICD-10-CM

## 2024-04-15 DIAGNOSIS — M54.6 THORACIC BACK PAIN, UNSPECIFIED BACK PAIN LATERALITY, UNSPECIFIED CHRONICITY: ICD-10-CM

## 2024-04-15 DIAGNOSIS — M54.50 ACUTE BILATERAL LOW BACK PAIN, UNSPECIFIED WHETHER SCIATICA PRESENT: Primary | ICD-10-CM

## 2024-04-15 DIAGNOSIS — M54.50 ACUTE BILATERAL LOW BACK PAIN, UNSPECIFIED WHETHER SCIATICA PRESENT: ICD-10-CM

## 2024-04-15 LAB
BILIRUB UR QL STRIP: NEGATIVE
CLARITY UR: CLEAR
COLOR UR: ABNORMAL
GLUCOSE UR STRIP-MCNC: NEGATIVE MG/DL
HGB UR QL STRIP.AUTO: NEGATIVE
KETONES UR QL STRIP: NEGATIVE
LEUKOCYTE ESTERASE UR QL STRIP.AUTO: NEGATIVE
NITRITE UR QL STRIP: NEGATIVE
PH UR STRIP.AUTO: 7 [PH] (ref 5–8)
PROT UR QL STRIP: NEGATIVE
SP GR UR STRIP: 1.01 (ref 1–1.03)
UROBILINOGEN UR QL STRIP: ABNORMAL

## 2024-04-15 PROCEDURE — 72131 CT LUMBAR SPINE W/O DYE: CPT

## 2024-04-15 PROCEDURE — 81003 URINALYSIS AUTO W/O SCOPE: CPT

## 2024-04-15 NOTE — PROGRESS NOTES
Pt scheduled for STAT CT lumbar spine.  Called pt's insurance for PA.  No PA required.  Reference #[id number].

## 2024-04-15 NOTE — TELEPHONE ENCOUNTER
Spoke w/ pt's brother and let him know that I spoke w/ the nurse practitioner as Dr. Rubio is out of the office.  She would like for pt to come in today for a UA and to try to get him scheduled for a CT of his lumbar spine today.  He states that pt has had no fevers, no bowel or bladder changes, no numbness.  He states that he is not eating very much at all, but is drinking water.  He has to have assistance to the bathroom as he is very weak.  I asked if he would be able to get him in today for the UA and scan and he states that he doesn't get up until around noon and it wouldn't do any good to try to wake him up now.  He states that when he wakes up that he will talk to him and see if he wants to come in or if he wants to go to the ER since he is in so much pain, not eating and very weak.  I gave him my direct number to call me back after he talks with him.

## 2024-04-16 DIAGNOSIS — G89.3 CANCER RELATED PAIN: Primary | ICD-10-CM

## 2024-04-16 DIAGNOSIS — C34.90 SMALL CELL LUNG CANCER: ICD-10-CM

## 2024-04-16 DIAGNOSIS — C79.31 METASTASIS TO BRAIN: ICD-10-CM

## 2024-04-16 RX ORDER — FENTANYL 50 UG/1
1 PATCH TRANSDERMAL
Qty: 10 PATCH | Refills: 0 | Status: SHIPPED | OUTPATIENT
Start: 2024-04-16 | End: 2024-04-17 | Stop reason: SDUPTHER

## 2024-04-16 NOTE — TELEPHONE ENCOUNTER
Spoke w/ pt's brother and pt would like a referral to hospice.  Spoke w/ Екатерина NP, order received for Fentanyl patches 50 mcg q 3 days, radiation referral and hospice referral.  Script signed by Екатерина and sent to pt's pharmacy.  Spoke w/ Jourdan in radiation and let him know that pt has a new spine met and we are referring him for possible XRT.  Since pt has just finished radiation a couple of weeks ago, Jourdan will call to discuss getting him back in for palliative radiation to his spine.  Referral and records faxed to [facility] hospice.  Spoke w/ Brittany, admissions coordinator w/ [facility] to let her know that a referral and records have been faxed.  She v/u.  My direct number given in case she has additional questions.  [employer] message sent to pt to let him know the above.

## 2024-04-17 ENCOUNTER — TELEPHONE (OUTPATIENT)
Dept: ONCOLOGY | Facility: CLINIC | Age: 59
End: 2024-04-17
Payer: COMMERCIAL

## 2024-04-17 DIAGNOSIS — G89.3 CANCER RELATED PAIN: ICD-10-CM

## 2024-04-17 RX ORDER — FENTANYL 50 UG/1
1 PATCH TRANSDERMAL
Qty: 10 PATCH | Refills: 0 | Status: SHIPPED | OUTPATIENT
Start: 2024-04-17 | End: 2024-04-17 | Stop reason: SDUPTHER

## 2024-04-17 RX ORDER — FENTANYL 50 UG/1
1 PATCH TRANSDERMAL
Qty: 10 PATCH | Refills: 0 | Status: SHIPPED | OUTPATIENT
Start: 2024-04-17 | End: 2024-05-17

## 2024-04-17 RX ORDER — FENTANYL 50 UG/1
1 PATCH TRANSDERMAL
Qty: 10 PATCH | Refills: 0 | Status: CANCELLED | OUTPATIENT
Start: 2024-04-17 | End: 2024-05-17

## 2024-04-17 NOTE — TELEPHONE ENCOUNTER
Pt's brother called in and said that the [facility] that we sent his Fentanyl to is out and he needs them resent to another [facility].  I'm forwarding a new script for you to sign.

## 2024-04-17 NOTE — TELEPHONE ENCOUNTER
Caller: Suhas Maciel    Relationship: Emergency Contact    Best call back number: [phone]      What was the call regarding: WE SENT FENTANYL PATCHES REFILLS TO [facility] ON [address] AND THEY DONT HAVE ANY, THEY WANT IT SENT TO      [id number] - [place], [place] - [address] AT [address] - [phone] [facility] [phone] FX     PLEASE CALL BROTHER AFTER IT IS CALLED IN SO THEY CAN

## 2024-04-17 NOTE — TELEPHONE ENCOUNTER
PATIENT BROTHER DEBBIE CALLING BACK THE FENTANYL PATCHES NEEDS TO GO TO [facility] [address]  SPOKE WITH ROGELIO AND SHE SAID SHE WOULD HAVE DR. FONTAINE SEND AGAIN.    DEBBIE V/U     CALL BACK NUMBER IF NEEDED  [phone]

## 2024-04-17 NOTE — TELEPHONE ENCOUNTER
PATIENTS BROTHER CALLED BACK TO CHECK STATUS ON FENTANYL PATCHES BEING CALLED, IT LOOKS LIKE IT WAS CALLED IN AGAIN BUT TO THE SAME PHARMACY THAT DOESN'T HAVE IT     PLEASE CALL BROTHER TO ADVISE

## 2024-04-19 ENCOUNTER — TELEPHONE (OUTPATIENT)
Dept: ONCOLOGY | Facility: CLINIC | Age: 59
End: 2024-04-19

## 2024-04-19 NOTE — TELEPHONE ENCOUNTER
Caller: Vaishnavi Maciel (MAHNAZ MACIEL CALLED, BUT SAID CONTACT BROTHER IF NEEDED)    Relationship to patient: Emergency Contact    Best call back number: [phone]     Chief complaint: PT NEEDS TO CANCEL APPTS FOR TODAY 04/19/24. THEY WILL NOT BE R/S AT THIS TIME. HOSPICE IS THERE NOW. WILL CALL BACK TO R/S IF ANYTHING CHANGES.     Type of visit: PORT FLUSH AND FOLLOW UP    Requested date: PT NEEDS TO CANCEL APPT.

## (undated) DEVICE — Device: Brand: ERBE

## (undated) DEVICE — DEV NDL ASP TRNSBRNCH EXCELON 19G 15MM 130CM

## (undated) DEVICE — BAPTIST FLOYD BRONCHOSCOPY: Brand: MEDLINE INDUSTRIES, INC.